# Patient Record
Sex: FEMALE | URBAN - METROPOLITAN AREA
[De-identification: names, ages, dates, MRNs, and addresses within clinical notes are randomized per-mention and may not be internally consistent; named-entity substitution may affect disease eponyms.]

---

## 2023-10-01 ENCOUNTER — HOSPITAL ENCOUNTER (INPATIENT)
Facility: HOSPITAL | Age: 63
LOS: 13 days | Discharge: HOME | DRG: 982 | End: 2023-10-14
Attending: INTERNAL MEDICINE | Admitting: STUDENT IN AN ORGANIZED HEALTH CARE EDUCATION/TRAINING PROGRAM
Payer: MEDICARE

## 2023-10-01 DIAGNOSIS — R60.0 EDEMA OF LEFT UPPER EXTREMITY: ICD-10-CM

## 2023-10-01 DIAGNOSIS — R33.9 URINARY RETENTION: ICD-10-CM

## 2023-10-01 DIAGNOSIS — J96.01 ACUTE HYPOXIC RESPIRATORY FAILURE (MULTI): Primary | ICD-10-CM

## 2023-10-01 DIAGNOSIS — M62.81 MUSCLE WEAKNESS: ICD-10-CM

## 2023-10-01 DIAGNOSIS — Z79.2 NEED FOR PROPHYLACTIC ANTIBIOTIC: ICD-10-CM

## 2023-10-01 DIAGNOSIS — K59.04 CHRONIC IDIOPATHIC CONSTIPATION: ICD-10-CM

## 2023-10-01 DIAGNOSIS — R00.0 TACHYCARDIA: ICD-10-CM

## 2023-10-01 DIAGNOSIS — K21.9 GASTROESOPHAGEAL REFLUX DISEASE WITHOUT ESOPHAGITIS: ICD-10-CM

## 2023-10-01 DIAGNOSIS — I10 PRIMARY HYPERTENSION: ICD-10-CM

## 2023-10-01 DIAGNOSIS — E11.9 TYPE 2 DIABETES MELLITUS WITHOUT COMPLICATION, WITHOUT LONG-TERM CURRENT USE OF INSULIN (MULTI): Chronic | ICD-10-CM

## 2023-10-01 LAB
ALBUMIN SERPL BCP-MCNC: 2 G/DL (ref 3.4–5)
ALP SERPL-CCNC: 81 U/L (ref 33–136)
ALT SERPL W P-5'-P-CCNC: 164 U/L (ref 7–45)
ANION GAP BLDV CALCULATED.4IONS-SCNC: ABNORMAL MMOL/L
ANION GAP SERPL CALC-SCNC: 12 MMOL/L (ref 10–20)
APTT PPP: 20 SECONDS (ref 27–38)
AST SERPL W P-5'-P-CCNC: 293 U/L (ref 9–39)
BASE EXCESS BLDV CALC-SCNC: 2.6 MMOL/L (ref -2–3)
BASOPHILS # BLD AUTO: 0.01 X10*3/UL (ref 0–0.1)
BASOPHILS NFR BLD AUTO: 0.1 %
BILIRUB SERPL-MCNC: 0.5 MG/DL (ref 0–1.2)
BODY TEMPERATURE: 37 DEGREES CELSIUS
BUN SERPL-MCNC: 34 MG/DL (ref 6–23)
CA-I BLDV-SCNC: ABNORMAL MMOL/L
CALCIUM SERPL-MCNC: 7.8 MG/DL (ref 8.6–10.6)
CARDIAC TROPONIN I PNL SERPL HS: 48 NG/L (ref 0–34)
CHLORIDE BLDV-SCNC: 125 MMOL/L (ref 98–107)
CHLORIDE SERPL-SCNC: 115 MMOL/L (ref 98–107)
CK SERPL-CCNC: 7961 U/L (ref 0–215)
CO2 SERPL-SCNC: 27 MMOL/L (ref 21–32)
CREAT SERPL-MCNC: 1.04 MG/DL (ref 0.5–1.05)
CRP SERPL-MCNC: 12.08 MG/DL
EOSINOPHIL # BLD AUTO: 0.11 X10*3/UL (ref 0–0.7)
EOSINOPHIL NFR BLD AUTO: 1.4 %
ERYTHROCYTE [DISTWIDTH] IN BLOOD BY AUTOMATED COUNT: 14.5 % (ref 11.5–14.5)
ERYTHROCYTE [SEDIMENTATION RATE] IN BLOOD BY WESTERGREN METHOD: 30 MM/H (ref 0–30)
GFR SERPL CREATININE-BSD FRML MDRD: 60 ML/MIN/1.73M*2
GLUCOSE BLDV-MCNC: 40 MG/DL (ref 74–99)
GLUCOSE SERPL-MCNC: 48 MG/DL (ref 74–99)
HCO3 BLDV-SCNC: 26.1 MMOL/L (ref 22–26)
HCT VFR BLD AUTO: 31.1 % (ref 36–46)
HCT VFR BLD EST: 29 % (ref 36–46)
HGB BLD-MCNC: 10.1 G/DL (ref 12–16)
HGB BLDV-MCNC: 9.5 G/DL (ref 12–16)
IMM GRANULOCYTES # BLD AUTO: 0.13 X10*3/UL (ref 0–0.7)
IMM GRANULOCYTES NFR BLD AUTO: 1.6 % (ref 0–0.9)
INR PPP: 1 (ref 0.9–1.1)
LACTATE BLDV-SCNC: 1.2 MMOL/L (ref 0.4–2)
LYMPHOCYTES # BLD AUTO: 0.85 X10*3/UL (ref 1.2–4.8)
LYMPHOCYTES NFR BLD AUTO: 10.6 %
MAGNESIUM SERPL-MCNC: 1.87 MG/DL (ref 1.6–2.4)
MCH RBC QN AUTO: 29.8 PG (ref 26–34)
MCHC RBC AUTO-ENTMCNC: 32.5 G/DL (ref 32–36)
MCV RBC AUTO: 92 FL (ref 80–100)
MONOCYTES # BLD AUTO: 0.46 X10*3/UL (ref 0.1–1)
MONOCYTES NFR BLD AUTO: 5.7 %
NEUTROPHILS # BLD AUTO: 6.48 X10*3/UL (ref 1.2–7.7)
NEUTROPHILS NFR BLD AUTO: 80.6 %
NRBC BLD-RTO: 0 /100 WBCS (ref 0–0)
OXYHGB MFR BLDV: 89.7 % (ref 45–75)
PCO2 BLDV: 35 MM HG (ref 41–51)
PH BLDV: 7.48 PH (ref 7.33–7.43)
PHOSPHATE SERPL-MCNC: 1.8 MG/DL (ref 2.5–4.9)
PLATELET # BLD AUTO: 251 X10*3/UL (ref 150–450)
PMV BLD AUTO: 10.4 FL (ref 7.5–11.5)
PO2 BLDV: 68 MM HG (ref 35–45)
POTASSIUM BLDV-SCNC: ABNORMAL MMOL/L
POTASSIUM SERPL-SCNC: 3.6 MMOL/L (ref 3.5–5.3)
PROT SERPL-MCNC: 4.1 G/DL (ref 6.4–8.2)
PROTHROMBIN TIME: 11.7 SECONDS (ref 9.8–12.8)
RBC # BLD AUTO: 3.39 X10*6/UL (ref 4–5.2)
SAO2 % BLDV: 93 % (ref 45–75)
SODIUM BLDV-SCNC: >180 MMOL/L (ref 136–145)
SODIUM SERPL-SCNC: 150 MMOL/L (ref 136–145)
WBC # BLD AUTO: 8 X10*3/UL (ref 4.4–11.3)

## 2023-10-01 PROCEDURE — 36415 COLL VENOUS BLD VENIPUNCTURE: CPT

## 2023-10-01 PROCEDURE — 83735 ASSAY OF MAGNESIUM: CPT

## 2023-10-01 PROCEDURE — 82550 ASSAY OF CK (CPK): CPT

## 2023-10-01 PROCEDURE — 99291 CRITICAL CARE FIRST HOUR: CPT | Performed by: INTERNAL MEDICINE

## 2023-10-01 PROCEDURE — 86038 ANTINUCLEAR ANTIBODIES: CPT

## 2023-10-01 PROCEDURE — 94002 VENT MGMT INPAT INIT DAY: CPT

## 2023-10-01 PROCEDURE — 85027 COMPLETE CBC AUTOMATED: CPT

## 2023-10-01 PROCEDURE — 71045 X-RAY EXAM CHEST 1 VIEW: CPT | Performed by: RADIOLOGY

## 2023-10-01 PROCEDURE — 82947 ASSAY GLUCOSE BLOOD QUANT: CPT

## 2023-10-01 PROCEDURE — 86140 C-REACTIVE PROTEIN: CPT | Performed by: HOSPITALIST

## 2023-10-01 PROCEDURE — 74018 RADEX ABDOMEN 1 VIEW: CPT | Performed by: RADIOLOGY

## 2023-10-01 PROCEDURE — 2020000001 HC ICU ROOM DAILY

## 2023-10-01 PROCEDURE — 85025 COMPLETE CBC W/AUTO DIFF WBC: CPT

## 2023-10-01 PROCEDURE — 2500000004 HC RX 250 GENERAL PHARMACY W/ HCPCS (ALT 636 FOR OP/ED)

## 2023-10-01 PROCEDURE — 2500000004 HC RX 250 GENERAL PHARMACY W/ HCPCS (ALT 636 FOR OP/ED): Performed by: STUDENT IN AN ORGANIZED HEALTH CARE EDUCATION/TRAINING PROGRAM

## 2023-10-01 PROCEDURE — 85652 RBC SED RATE AUTOMATED: CPT | Performed by: HOSPITALIST

## 2023-10-01 PROCEDURE — 86235 NUCLEAR ANTIGEN ANTIBODY: CPT | Performed by: HOSPITALIST

## 2023-10-01 PROCEDURE — 84484 ASSAY OF TROPONIN QUANT: CPT

## 2023-10-01 PROCEDURE — 80069 RENAL FUNCTION PANEL: CPT

## 2023-10-01 PROCEDURE — 84100 ASSAY OF PHOSPHORUS: CPT

## 2023-10-01 PROCEDURE — 82435 ASSAY OF BLOOD CHLORIDE: CPT

## 2023-10-01 PROCEDURE — 82805 BLOOD GASES W/O2 SATURATION: CPT

## 2023-10-01 PROCEDURE — 85610 PROTHROMBIN TIME: CPT

## 2023-10-01 PROCEDURE — 2500000005 HC RX 250 GENERAL PHARMACY W/O HCPCS

## 2023-10-01 PROCEDURE — 2500000005 HC RX 250 GENERAL PHARMACY W/O HCPCS: Performed by: STUDENT IN AN ORGANIZED HEALTH CARE EDUCATION/TRAINING PROGRAM

## 2023-10-01 PROCEDURE — 82085 ASSAY OF ALDOLASE: CPT

## 2023-10-01 PROCEDURE — 2500000001 HC RX 250 WO HCPCS SELF ADMINISTERED DRUGS (ALT 637 FOR MEDICARE OP): Performed by: STUDENT IN AN ORGANIZED HEALTH CARE EDUCATION/TRAINING PROGRAM

## 2023-10-01 RX ORDER — HEPARIN SODIUM 5000 [USP'U]/ML
5000 INJECTION, SOLUTION INTRAVENOUS; SUBCUTANEOUS EVERY 8 HOURS
Status: DISPENSED | OUTPATIENT
Start: 2023-10-01 | End: 2023-10-10

## 2023-10-01 RX ORDER — DEXTROSE, SODIUM CHLORIDE, SODIUM LACTATE, POTASSIUM CHLORIDE, AND CALCIUM CHLORIDE 5; .6; .31; .03; .02 G/100ML; G/100ML; G/100ML; G/100ML; G/100ML
125 INJECTION, SOLUTION INTRAVENOUS CONTINUOUS
Status: DISCONTINUED | OUTPATIENT
Start: 2023-10-01 | End: 2023-10-01

## 2023-10-01 RX ORDER — ESOMEPRAZOLE MAGNESIUM 40 MG/1
40 GRANULE, DELAYED RELEASE ORAL
Status: DISCONTINUED | OUTPATIENT
Start: 2023-10-02 | End: 2023-10-09

## 2023-10-01 RX ORDER — DEXTROSE 50 % IN WATER (D50W) INTRAVENOUS SYRINGE
Status: COMPLETED
Start: 2023-10-01 | End: 2023-10-01

## 2023-10-01 RX ORDER — POLYETHYLENE GLYCOL 3350 17 G/17G
17 POWDER, FOR SOLUTION ORAL DAILY
Status: DISCONTINUED | OUTPATIENT
Start: 2023-10-01 | End: 2023-10-04

## 2023-10-01 RX ORDER — DEXTROSE 50 % IN WATER (D50W) INTRAVENOUS SYRINGE
25
Status: DISCONTINUED | OUTPATIENT
Start: 2023-10-01 | End: 2023-10-08 | Stop reason: SDUPTHER

## 2023-10-01 RX ORDER — AMOXICILLIN 250 MG
1 CAPSULE ORAL NIGHTLY
Status: DISCONTINUED | OUTPATIENT
Start: 2023-10-01 | End: 2023-10-14 | Stop reason: HOSPADM

## 2023-10-01 RX ORDER — GABAPENTIN 250 MG/5ML
300 SOLUTION ORAL
Status: DISCONTINUED | OUTPATIENT
Start: 2023-10-02 | End: 2023-10-14 | Stop reason: HOSPADM

## 2023-10-01 RX ORDER — DEXTROSE MONOHYDRATE 100 MG/ML
0.3 INJECTION, SOLUTION INTRAVENOUS ONCE AS NEEDED
Status: DISCONTINUED | OUTPATIENT
Start: 2023-10-01 | End: 2023-10-08 | Stop reason: SDUPTHER

## 2023-10-01 RX ORDER — DEXTROSE MONOHYDRATE 50 MG/ML
100 INJECTION, SOLUTION INTRAVENOUS CONTINUOUS
Status: ACTIVE | OUTPATIENT
Start: 2023-10-01 | End: 2023-10-02

## 2023-10-01 RX ORDER — GABAPENTIN 250 MG/5ML
900 SOLUTION ORAL NIGHTLY
Status: DISCONTINUED | OUTPATIENT
Start: 2023-10-01 | End: 2023-10-14 | Stop reason: HOSPADM

## 2023-10-01 RX ORDER — DEXMEDETOMIDINE HYDROCHLORIDE 4 UG/ML
.2-1.5 INJECTION, SOLUTION INTRAVENOUS CONTINUOUS
Status: DISCONTINUED | OUTPATIENT
Start: 2023-10-01 | End: 2023-10-04

## 2023-10-01 RX ORDER — POLYETHYLENE GLYCOL 3350 17 G/17G
17 POWDER, FOR SOLUTION ORAL DAILY
Status: DISCONTINUED | OUTPATIENT
Start: 2023-10-01 | End: 2023-10-01

## 2023-10-01 RX ADMIN — DEXMEDETOMIDINE HYDROCHLORIDE 0.2 MCG/KG/HR: 4 INJECTION, SOLUTION INTRAVENOUS at 17:00

## 2023-10-01 RX ADMIN — DEXTROSE 50 % IN WATER (D50W) INTRAVENOUS SYRINGE 25 G: at 16:30

## 2023-10-01 RX ADMIN — Medication: at 17:00

## 2023-10-01 RX ADMIN — POLYETHYLENE GLYCOL 3350 17 G: 17 POWDER, FOR SOLUTION ORAL at 20:41

## 2023-10-01 RX ADMIN — DEXMEDETOMIDINE HYDROCHLORIDE 0.5 MCG/KG/HR: 4 INJECTION, SOLUTION INTRAVENOUS at 23:28

## 2023-10-01 RX ADMIN — DEXTROSE MONOHYDRATE 100 ML/HR: 50 INJECTION, SOLUTION INTRAVENOUS at 17:45

## 2023-10-01 RX ADMIN — SENNOSIDES AND DOCUSATE SODIUM 1 TABLET: 50; 8.6 TABLET ORAL at 20:40

## 2023-10-01 RX ADMIN — DEXTROSE MONOHYDRATE 25 G: 25 INJECTION, SOLUTION INTRAVENOUS at 16:30

## 2023-10-01 SDOH — HEALTH STABILITY: MENTAL HEALTH
STRESS IS WHEN SOMEONE FEELS TENSE, NERVOUS, ANXIOUS, OR CAN'T SLEEP AT NIGHT BECAUSE THEIR MIND IS TROUBLED. HOW STRESSED ARE YOU?: NOT AT ALL

## 2023-10-01 SDOH — SOCIAL STABILITY: SOCIAL INSECURITY: HAS ANYONE EVER THREATENED TO HURT YOUR FAMILY OR YOUR PETS?: UNABLE TO ASSESS

## 2023-10-01 SDOH — ECONOMIC STABILITY: INCOME INSECURITY: IN THE LAST 12 MONTHS, WAS THERE A TIME WHEN YOU WERE NOT ABLE TO PAY THE MORTGAGE OR RENT ON TIME?: NO

## 2023-10-01 SDOH — ECONOMIC STABILITY: TRANSPORTATION INSECURITY
IN THE PAST 12 MONTHS, HAS LACK OF TRANSPORTATION KEPT YOU FROM MEETINGS, WORK, OR FROM GETTING THINGS NEEDED FOR DAILY LIVING?: NO

## 2023-10-01 SDOH — SOCIAL STABILITY: SOCIAL INSECURITY: DOES ANYONE TRY TO KEEP YOU FROM HAVING/CONTACTING OTHER FRIENDS OR DOING THINGS OUTSIDE YOUR HOME?: UNABLE TO ASSESS

## 2023-10-01 SDOH — ECONOMIC STABILITY: FOOD INSECURITY: WITHIN THE PAST 12 MONTHS, YOU WORRIED THAT YOUR FOOD WOULD RUN OUT BEFORE YOU GOT MONEY TO BUY MORE.: NEVER TRUE

## 2023-10-01 SDOH — SOCIAL STABILITY: SOCIAL NETWORK
DO YOU BELONG TO ANY CLUBS OR ORGANIZATIONS SUCH AS CHURCH GROUPS UNIONS, FRATERNAL OR ATHLETIC GROUPS, OR SCHOOL GROUPS?: NO

## 2023-10-01 SDOH — SOCIAL STABILITY: SOCIAL INSECURITY: DO YOU FEEL ANYONE HAS EXPLOITED OR TAKEN ADVANTAGE OF YOU FINANCIALLY OR OF YOUR PERSONAL PROPERTY?: UNABLE TO ASSESS

## 2023-10-01 SDOH — SOCIAL STABILITY: SOCIAL NETWORK: HOW OFTEN DO YOU ATTENT MEETINGS OF THE CLUB OR ORGANIZATION YOU BELONG TO?: 1 TO 4 TIMES PER YEAR

## 2023-10-01 SDOH — SOCIAL STABILITY: SOCIAL INSECURITY: WITHIN THE LAST YEAR, HAVE YOU BEEN HUMILIATED OR EMOTIONALLY ABUSED IN OTHER WAYS BY YOUR PARTNER OR EX-PARTNER?: NO

## 2023-10-01 SDOH — SOCIAL STABILITY: SOCIAL INSECURITY
WITHIN THE LAST YEAR, HAVE YOU BEEN KICKED, HIT, SLAPPED, OR OTHERWISE PHYSICALLY HURT BY YOUR PARTNER OR EX-PARTNER?: NO

## 2023-10-01 SDOH — ECONOMIC STABILITY: FOOD INSECURITY: WITHIN THE PAST 12 MONTHS, THE FOOD YOU BOUGHT JUST DIDN'T LAST AND YOU DIDN'T HAVE MONEY TO GET MORE.: NEVER TRUE

## 2023-10-01 SDOH — SOCIAL STABILITY: SOCIAL NETWORK: ARE YOU MARRIED, WIDOWED, DIVORCED, SEPARATED, NEVER MARRIED, OR LIVING WITH A PARTNER?: PATIENT DECLINED

## 2023-10-01 SDOH — SOCIAL STABILITY: SOCIAL INSECURITY: ARE THERE ANY APPARENT SIGNS OF INJURIES/BEHAVIORS THAT COULD BE RELATED TO ABUSE/NEGLECT?: UNABLE TO ASSESS

## 2023-10-01 SDOH — SOCIAL STABILITY: SOCIAL INSECURITY: WITHIN THE LAST YEAR, HAVE YOU BEEN AFRAID OF YOUR PARTNER OR EX-PARTNER?: NO

## 2023-10-01 SDOH — SOCIAL STABILITY: SOCIAL INSECURITY
WITHIN THE LAST YEAR, HAVE TO BEEN RAPED OR FORCED TO HAVE ANY KIND OF SEXUAL ACTIVITY BY YOUR PARTNER OR EX-PARTNER?: NO

## 2023-10-01 SDOH — SOCIAL STABILITY: SOCIAL INSECURITY: ARE YOU OR HAVE YOU BEEN THREATENED OR ABUSED PHYSICALLY, EMOTIONALLY, OR SEXUALLY BY ANYONE?: UNABLE TO ASSESS

## 2023-10-01 SDOH — SOCIAL STABILITY: SOCIAL INSECURITY: ABUSE: ADULT

## 2023-10-01 SDOH — SOCIAL STABILITY: SOCIAL INSECURITY: HAVE YOU HAD THOUGHTS OF HARMING ANYONE ELSE?: UNABLE TO ASSESS

## 2023-10-01 SDOH — SOCIAL STABILITY: SOCIAL NETWORK: IN A TYPICAL WEEK, HOW MANY TIMES DO YOU TALK ON THE PHONE WITH FAMILY, FRIENDS, OR NEIGHBORS?: ONCE A WEEK

## 2023-10-01 SDOH — SOCIAL STABILITY: SOCIAL NETWORK: HOW OFTEN DO YOU GET TOGETHER WITH FRIENDS OR RELATIVES?: ONCE A WEEK

## 2023-10-01 SDOH — SOCIAL STABILITY: SOCIAL INSECURITY: DO YOU FEEL UNSAFE GOING BACK TO THE PLACE WHERE YOU ARE LIVING?: UNABLE TO ASSESS

## 2023-10-01 SDOH — ECONOMIC STABILITY: TRANSPORTATION INSECURITY
IN THE PAST 12 MONTHS, HAS THE LACK OF TRANSPORTATION KEPT YOU FROM MEDICAL APPOINTMENTS OR FROM GETTING MEDICATIONS?: NO

## 2023-10-01 SDOH — ECONOMIC STABILITY: HOUSING INSECURITY
IN THE LAST 12 MONTHS, WAS THERE A TIME WHEN YOU DID NOT HAVE A STEADY PLACE TO SLEEP OR SLEPT IN A SHELTER (INCLUDING NOW)?: NO

## 2023-10-01 SDOH — ECONOMIC STABILITY: HOUSING INSECURITY: IN THE LAST 12 MONTHS, HOW MANY PLACES HAVE YOU LIVED?: 1

## 2023-10-01 SDOH — SOCIAL STABILITY: SOCIAL NETWORK: HOW OFTEN DO YOU ATTEND CHURCH OR RELIGIOUS SERVICES?: NEVER

## 2023-10-01 SDOH — ECONOMIC STABILITY: INCOME INSECURITY: HOW HARD IS IT FOR YOU TO PAY FOR THE VERY BASICS LIKE FOOD, HOUSING, MEDICAL CARE, AND HEATING?: NOT HARD AT ALL

## 2023-10-01 ASSESSMENT — LIFESTYLE VARIABLES
SKIP TO QUESTIONS 9-10: 1
HOW MANY STANDARD DRINKS CONTAINING ALCOHOL DO YOU HAVE ON A TYPICAL DAY: 1 OR 2
AUDIT-C TOTAL SCORE: 1
SUBSTANCE_ABUSE_PAST_12_MONTHS: NO
AUDIT-C TOTAL SCORE: 1
HOW OFTEN DO YOU HAVE 6 OR MORE DRINKS ON ONE OCCASION: NEVER
PRESCIPTION_ABUSE_PAST_12_MONTHS: NO
HOW OFTEN DO YOU HAVE A DRINK CONTAINING ALCOHOL: MONTHLY OR LESS

## 2023-10-01 ASSESSMENT — ACTIVITIES OF DAILY LIVING (ADL)
HEARING - LEFT EAR: UNABLE TO ASSESS
TOILETING: UNABLE TO ASSESS
FEEDING YOURSELF: UNABLE TO ASSESS
PATIENT'S MEMORY ADEQUATE TO SAFELY COMPLETE DAILY ACTIVITIES?: UNABLE TO ASSESS
JUDGMENT_ADEQUATE_SAFELY_COMPLETE_DAILY_ACTIVITIES: UNABLE TO ASSESS
ADEQUATE_TO_COMPLETE_ADL: UNABLE TO ASSESS
HEARING - RIGHT EAR: UNABLE TO ASSESS
WALKS IN HOME: UNABLE TO ASSESS
ASSISTIVE_DEVICE: EYEGLASSES
ADEQUATE_TO_COMPLETE_ADL: UNABLE TO ASSESS
JUDGMENT_ADEQUATE_SAFELY_COMPLETE_DAILY_ACTIVITIES: UNABLE TO ASSESS
BATHING: UNABLE TO ASSESS
GROOMING: UNABLE TO ASSESS
ASSISTIVE_DEVICE: EYEGLASSES
PATIENT'S MEMORY ADEQUATE TO SAFELY COMPLETE DAILY ACTIVITIES?: UNABLE TO ASSESS
DRESSING YOURSELF: UNABLE TO ASSESS

## 2023-10-01 ASSESSMENT — PATIENT HEALTH QUESTIONNAIRE - PHQ9
2. FEELING DOWN, DEPRESSED OR HOPELESS: NOT AT ALL
1. LITTLE INTEREST OR PLEASURE IN DOING THINGS: NOT AT ALL
SUM OF ALL RESPONSES TO PHQ9 QUESTIONS 1 & 2: 0

## 2023-10-01 ASSESSMENT — COGNITIVE AND FUNCTIONAL STATUS - GENERAL
PERSONAL GROOMING: TOTAL
DRESSING REGULAR UPPER BODY CLOTHING: TOTAL
DAILY ACTIVITIY SCORE: 6
PATIENT BASELINE BEDBOUND: UNABLE TO ASSESS AT THIS TIME
MOVING TO AND FROM BED TO CHAIR: TOTAL
CLIMB 3 TO 5 STEPS WITH RAILING: TOTAL
WALKING IN HOSPITAL ROOM: TOTAL
MOVING FROM LYING ON BACK TO SITTING ON SIDE OF FLAT BED WITH BEDRAILS: TOTAL
DRESSING REGULAR LOWER BODY CLOTHING: TOTAL
EATING MEALS: TOTAL
TURNING FROM BACK TO SIDE WHILE IN FLAT BAD: TOTAL
MOBILITY SCORE: 6
TOILETING: TOTAL
STANDING UP FROM CHAIR USING ARMS: TOTAL
HELP NEEDED FOR BATHING: TOTAL

## 2023-10-01 ASSESSMENT — COLUMBIA-SUICIDE SEVERITY RATING SCALE - C-SSRS
2. HAVE YOU ACTUALLY HAD ANY THOUGHTS OF KILLING YOURSELF?: NO
1. IN THE PAST MONTH, HAVE YOU WISHED YOU WERE DEAD OR WISHED YOU COULD GO TO SLEEP AND NOT WAKE UP?: NO
6. HAVE YOU EVER DONE ANYTHING, STARTED TO DO ANYTHING, OR PREPARED TO DO ANYTHING TO END YOUR LIFE?: NO
6. HAVE YOU EVER DONE ANYTHING, STARTED TO DO ANYTHING, OR PREPARED TO DO ANYTHING TO END YOUR LIFE?: NO
2. HAVE YOU ACTUALLY HAD ANY THOUGHTS OF KILLING YOURSELF?: NO

## 2023-10-01 ASSESSMENT — PAIN - FUNCTIONAL ASSESSMENT: PAIN_FUNCTIONAL_ASSESSMENT: CPOT (CRITICAL CARE PAIN OBSERVATION TOOL)

## 2023-10-01 NOTE — H&P
MRN: [id number]  CHIEF COMPLAINT  ====================================  Generalized weakness    HISTORY OF PRESENT ILLNESS  ====================================  Bisi Mckay is a 64 yo F w/ PMHx primary biliary cirrhosis, HTN, GERD, DM, breast cancer, and osteoarthritis presenting to [facility] on 9/25 for weakness and fall [facility] acute hypoxic respiratory failure and rheumatologic/neuromuscular consult.    Patient was reportedly having ascending weakness over the past several weeks. She endorsed difficulty climbing stairs, getting up from sitting position, combing her hair and holding a spoon. Per documentation had “speech irregularities” or dysarthria. She denied numbness or tingling. Two weeks ago she endorsed URI symptoms but her home covid test was negative. Her PCP ordered an MRI 9/10 to further evaluate her progressive weakness which was unremarkable. She was found down at home (reportedly for a few hours) by her son on 9/25. On initial arrival was tachycardic to 120s, had leukocytosis to 12.6, creat of 1.75 (baseline 0.7), CK of 2880 and trop of 296. CT head, CT C spine and CXR were unremarkable. She was admitted to the [facility] floor with concern for rhabdo. She was managed with maintenance fluids for the rhabdo however CK continued to rise with peak of 11,773. She was also treated for a UTI with ceftriaxone (9/26 - ). Nephrology was consulted for SKIP, thought to be 2/2 to rhabdo and started on bicarb drip for metabolic acidosis. On 9/27 SKIP continued to worsen w/ Cr at 2.5; had a renal ultrasound which was unremarkable. On 9/28 started to desat; was requiring 2L NC on the floor. Also endorsing swallowing difficulties and dysphagia. Was evaluated by speech who recommended strict NPO.     She had several choking episodes on the floor, and eventually a rapid response was called for tachypnea, tachycardia, fever and lethargy. Patient was intubated and transferred to the ICU.  Differential is  broad and includes autoimmune process, neuromuscular disease or central process. Patient was ultimately transferred for eval by subspecialist teams.    [facility] MICU:  Patient arrived alert but on mechanical ventilation. Patient's son and sister-in-law were bedside. Son notes that patient had been on a bus trip to [place], [place] within the past month. States the trip appeared to fatigue and weaken his mother. Noted that patient became progressively weak and complained of some lower back pain. Patient eventually fell once on the 23rd and then a second time on the 25th before being admitted to [facility]. Son notes that patient has a bladder stimulation device that has special conditions for use with MRI. He is not sure what those conditions are, but is willing to bring the card with the device's information tomorrow morning.    On Arrival to [facility]:  VS: T 37.1  /58  RR 16 O2 94% on RA    Labs:  CBC WBC 12.6 Hb 13.2 Plt 178  RFP Na 135 K 4.3 Cl 105 HCO3 17 BUN 36 SCr 1.75 (on admit) -> 2.5 (peak) Glc 253 Ca 9.2   LFTs AST//78 Alk Phos 99 T Bili 1 Alb 3.0 TProt 4.9  Coags INR 1.0 PT 12 (9/29)  COVID Negative  CK 2880 (9/25) -> 11,773 (9/30)  Trop 296 (9/25) -> 457 -> 461 -> 337 (9/28)  Lactate 1.7  Ammonia 31  ESR: 68  CRP: 29  TSH: 0.032 (low); T4 1.28 (wnl); anti TPO ab (<28); nml  B12: >2000  C3 132, C4 56 (high)  Ceruloplasmin 37 (wnl)  Cryoglobulin neg   AM cortisol: wnl   LDH  >750  UA (9/29): large blood, 3-5 RBCs, +protein, trace leuk est, 1+ bacteria, +casts       Imaging:   EKG sinus tachycardia   CXR IMPRESSION:No significant change of hazy left mid to lower lung opacification. Similar small left pleural effusion.    Renal US (9/27/23)  IMPRESSION:  No obstruction or acute abnormality in the kidneys. Borderline increased  renal echogenicity consistent with medical renal disease.    MRI Brain (9/10/23)  FINDINGS:  INTRACRANIAL STRUCTURES/VENTRICLES: No abnormal restricted  diffusion or  susceptibility. No mass effect or midline shift. No evidence of an acute  intracranial hemorrhage. Dilated perivascular space left lower basal ganglia  region. Few scattered periventricular and subcortical white matter FLAIR  hyperintensities are nonspecific but statistically most consistent with mild  chronic microvascular angiopathy. Small dural calcifications are near the  vertex and beneath the left coronal suture. Normal ventricular size and  morphology. Mild involutional changes. The sellar/suprasellar regions  appear unremarkable. The normal signal voids within the major intracranial  vessels appear maintained.     ORBITS: The visualized portion of the orbits demonstrate no acute abnormality.     SINUSES: The visualized paranasal sinuses and mastoid air cells demonstrate  no acute abnormality.     BONES/SOFT TISSUES: The bone marrow signal intensity appears normal. The soft  tissues demonstrate no acute abnormality. Mild hyperostosis frontalis  interna.     IMPRESSION:  Minimal chronic microvascular angiopathy. No acute intracranial abnormality.    CT Head (9/25)  FINDINGS:  BRAIN/VENTRICLES: There is no acute intracranial hemorrhage, mass effect or  midline shift. No abnormal extra-axial fluid collection. The gray-white  differentiation is maintained without evidence of an acute infarct. There is  no evidence of hydrocephalus.     ORBITS: The visualized portion of the orbits demonstrate no acute abnormality.     SINUSES: The visualized paranasal sinuses and mastoid air cells demonstrate  no acute abnormality.     SOFT TISSUES/SKULL: No acute abnormality of the visualized skull or soft  tissues.     IMPRESSION:  No acute intracranial abnormality.    CT C Spine w/wo (9/25)  IMPRESSION:  1. No acute abnormality of the cervical spine.  2. Enlargement heterogeneity and multi-nodularity of the right thyroid lobe.  Recommend further evaluation with nonemergent thyroid ultrasound.      Infectious  [facility]   Respiratory panel (9/30):  Adenovirus, coronavirus, COVID, human metapneumovirus, Influenza A/B, Paraflu 1-4, Rhinovirus, RSV, Myocplasma pneumo, chlamydia pneuom, bordatella - ALL NEGATIVE    MRSA nares neg (9/29)    RPR non reactive (9/29)  HIV ½ neg (9/29)    CSF Studies:  E coli, H flu, listeria, Neisseria meningitides, Strep agalactiae, S pneumo, CMV, enterovirus, HVS ½, HHV 6, human parechovirus, VZV, Cryptococcus - ALL NEGATIVE     CSF Culture (9/29/23)  PROCEDURE:                Culture Cerebrospinal    ACCESSION:                [id number]                            Fluid with Gram Stain                            [*1]  SOURCE:                   Cerebrospinal Fluid      BODY SITE:  COLLECTED DATE/TIME:      9/29/2023 17:14 EDT      RECEIVED DATE/TIME:       9/29/2023 17:36 EDT  START DATE/TIME:          9/29/2023 17:36 EDT      FREE TEXT SOURCE:    PRELIMINARY REPORTS  Preliminary Report  []  Verified Date/Time/Personnel: 9/30/2023 12:48 EDT  No growth to date    STAINS    GS  []  Sedimented  No organisms seen.    Tracheal Aspirate (9/29)  STAINS  GS  []    Rare Polymorphonuclear cells  Rare Mononuclear cells  No organisms seen.    Blood Culture (9/29) x2  PRELIMINARY REPORTS  Preliminary Report  []  Verified Date/Time/Personnel: 9/29/2023 07:59 EDT  Culture has been received in lab and is no growth to date. Routine cultures are held for 5 days.    Blood Culture: Fungal (9/28)    PRELIMINARY REPORTS  Preliminary Report  []  Verified Date/Time/Personnel: 9/29/2023 07:59 EDT  Culture has been received in lab and is no growth to date. Culture will be held for four weeks.    Urine Culture (9/29)  Preliminary Report  []  Verified Date/Time/Personnel: 9/30/2023 11:48 EDT  No growth to date    PAST MEDICAL HISTORY  ====================================  -osteoarthritis   -GERD  -HTN  -IBS  -goiter   -T2DM  -primary biliary cirrhosis     PAST SURGICAL  HISTORY  ====================================  -operation on thumb 2018  -carpal tunnel release 2016  -bowel obstruction 2016  -appendectomy 2014  -lumpectomy 2012  -hysterectomy 2007  -gastric bypass 2002    HOME MEDICATIONS  ====================================  Calcium 600+D oral tablet 1 tab(s), Oral, qAM   cetirizine 10 mg oral tablet 10 mg = 1 tab(s), Oral, qAM  Cipro 500 mg, Oral, q12h   Coenzyme Q10 60 mg oral tablet , Oral, BID   Colace 100 mg oral capsule 100 mg = 1 cap(s), PRN, Oral, BID   Dexilant 60 mg oral delayed release capsule   gabapentin 300 mg oral capsule   hyoscyamine 0.125 mg oral tablet 0.125 mg = 1 tab(s), PRN, Oral, BID   Magnesium 250 mg tablet 250 mg = 1 tab(s), Oral, qDay   MetFORMIN (Eqv-Fortamet) 500 mg, Oral, qDay   metroNIDAZOLE 500 mg, q12h   Milk Thistle oral capsule   Niacinamide 500 mg oral tablet 500 mg = 1 tab(s), Oral, Every other day   prenatal Multivitamins oral tablet 1 tab(s), Oral, qAM   Slow Fe 160 mg (50 mg elemental iron) oral tablet, extended release 160 mg = 1 tab(s), Oral, Mon/Wed/Fri   spironolactone 50 mg oral tablet 50 mg = 1 tab(s), Oral, qAM   Trulance 3 mg oral tablet 3 mg = 1 tab(s), Oral, qDay   Vitamin B-12 1000 mcg oral tablet 2,000 mcg = 2 tab(s), Oral, qAM   Vitamin C 1000 mg oral tablet 2,000 mg = 2 tab(s), Oral, BID   Vitamin D3 5000 intl units oral tablet 5,000 International_Unit = 1 tab(s), Oral, qAM   Zinc 140 mg (as elemental zinc 50 mg) oral tablet 140 mg = 1 tab(s), Oral, Tuesday & Saturday    Past Medical History  She has a past medical history of Abnormal liver enzymes, Arthritis, Breast cancer (CMS/HCC), Constipation, Diabetes mellitus, type 2 (CMS/HCC), GERD (gastroesophageal reflux disease), Hypertension, Irritable bowel syndrome, Muscle weakness, and Neuropathy.    Surgical History  She has a past surgical history that includes Carpal tunnel release (N/A, 2016); Abdominal adhesion surgery (09/24/2014); Appendectomy (09/24/2014);  Cholecystectomy (11/01/2012); Breast lumpectomy (06/2012); Hysterectomy (04/01/2007); and Bladder suspension (04/01/2007).     Social History  She reports that she has never smoked. She does not have any smokeless tobacco history on file. She reports current alcohol use. She reports that she does not use drugs.    Family History  Family History   Problem Relation Name Age of Onset    Breast cancer Mother      Diabetes Mother      Hypertension Mother      Hyperlipidemia Mother      Cancer Father      Hypertension Father      Pancreatic cancer Father      Stomach cancer Father      Diabetes Sister      Hypertension Sister      Asthma Son          Allergies  Bactrim [sulfamethoxazole-trimethoprim]    Review of Systems  Patient denies pain.  Further ROS cannot be performed as patient both nonverbal and was also sedated upon second attempt to obtain history.     Physical Exam  Constitutional:       General: She is not in acute distress.     Appearance: Normal appearance.      Interventions: She is intubated.   HENT:      Head: Normocephalic and atraumatic.      Mouth/Throat:      Mouth: Mucous membranes are moist.      Pharynx: Oropharynx is clear. No oropharyngeal exudate or posterior oropharyngeal erythema.   Eyes:      General: No scleral icterus.     Extraocular Movements: Extraocular movements intact.      Conjunctiva/sclera: Conjunctivae normal.      Pupils: Pupils are equal, round, and reactive to light.   Cardiovascular:      Rate and Rhythm: Normal rate and regular rhythm.      Pulses: Normal pulses.      Heart sounds: Normal heart sounds.   Pulmonary:      Effort: She is intubated.      Breath sounds: Normal breath sounds. No wheezing, rhonchi or rales.   Abdominal:      General: Abdomen is flat. Bowel sounds are normal.      Palpations: Abdomen is soft.      Tenderness: There is no abdominal tenderness. There is no guarding or rebound.   Musculoskeletal:         General: No tenderness or signs of injury.       "Cervical back: Normal range of motion.      Right lower leg: No edema.      Left lower leg: No edema.   Skin:     General: Skin is warm and dry.      Coloration: Skin is not jaundiced or pale.      Findings: No bruising, erythema, lesion or rash.   Neurological:      Mental Status: She is alert.      Motor: Weakness present.   Psychiatric:         Mood and Affect: Mood normal.         Behavior: Behavior normal.          Last Recorded Vitals  Blood pressure 97/53, pulse 64, temperature 36.3 °C (97.3 °F), temperature source Temporal, resp. rate 16, height 1.651 m (5' 5\"), weight 104 kg (228 lb 6.3 oz), SpO2 99 %.    Relevant Results    Scheduled medications  influenza, 0.7 mL, intramuscular, During hospitalization  heparin (porcine), 5,000 Units, subcutaneous, q8h  oxygen, , inhalation, Continuous - 02/gases  pneumococcal conjugate, 0.5 mL, intramuscular, During hospitalization  polyethylene glycol, 17 g, oral, Daily      Continuous medications  dexmedeTOMIDine, 0.2-1.5 mcg/kg/hr, Last Rate: Stopped (10/01/23 1821)  [Held by provider] dextrose 5%, 100 mL/hr, Last Rate: Stopped (10/01/23 1821)      PRN medications  PRN medications: dextrose, dextrose, glucagon   Results for orders placed or performed during the hospital encounter of 10/01/23 (from the past 24 hour(s))   Blood Gas Venous Full Panel   Result Value Ref Range    POCT pH, Venous 7.48 (H) 7.33 - 7.43 pH    POCT pCO2, Venous 35 (L) 41 - 51 mm Hg    POCT pO2, Venous 68 (H) 35 - 45 mm Hg    POCT SO2, Venous 93 (H) 45 - 75 %    POCT Oxy Hemoglobin, Venous 89.7 (H) 45.0 - 75.0 %    POCT Hematocrit Calculated, Venous 29.0 (L) 36.0 - 46.0 %    POCT Sodium, Venous >180 (HH) 136 - 145 mmol/L    POCT Potassium, Venous      POCT Chloride, Venous 125 (H) 98 - 107 mmol/L    POCT Ionized Calicum, Venous      POCT Glucose, Venous 40 (LL) 74 - 99 mg/dL    POCT Lactate, Venous 1.2 0.4 - 2.0 mmol/L    POCT Base Excess, Venous 2.6 -2.0 - 3.0 mmol/L    POCT HCO3 Calculated, " Venous 26.1 (H) 22.0 - 26.0 mmol/L    POCT Hemoglobin, Venous 9.5 (L) 12.0 - 16.0 g/dL    POCT Anion Gap, Venous      Patient Temperature 37.0 degrees Celsius   Troponin I, High Sensitivity   Result Value Ref Range    Troponin I, High Sensitivity 48 (H) 0 - 34 ng/L   Magnesium   Result Value Ref Range    Magnesium 1.87 1.60 - 2.40 mg/dL   Phosphorus   Result Value Ref Range    Phosphorus 1.8 (L) 2.5 - 4.9 mg/dL   CBC and Auto Differential   Result Value Ref Range    WBC 8.0 4.4 - 11.3 x10*3/uL    nRBC 0.0 0.0 - 0.0 /100 WBCs    RBC 3.39 (L) 4.00 - 5.20 x10*6/uL    Hemoglobin 10.1 (L) 12.0 - 16.0 g/dL    Hematocrit 31.1 (L) 36.0 - 46.0 %    MCV 92 80 - 100 fL    MCH 29.8 26.0 - 34.0 pg    MCHC 32.5 32.0 - 36.0 g/dL    RDW 14.5 11.5 - 14.5 %    Platelets 251 150 - 450 x10*3/uL    MPV 10.4 7.5 - 11.5 fL    Neutrophils % 80.6 40.0 - 80.0 %    Immature Granulocytes %, Automated 1.6 (H) 0.0 - 0.9 %    Lymphocytes % 10.6 13.0 - 44.0 %    Monocytes % 5.7 2.0 - 10.0 %    Eosinophils % 1.4 0.0 - 6.0 %    Basophils % 0.1 0.0 - 2.0 %    Neutrophils Absolute 6.48 1.20 - 7.70 x10*3/uL    Immature Granulocytes Absolute, Automated 0.13 0.00 - 0.70 x10*3/uL    Lymphocytes Absolute 0.85 (L) 1.20 - 4.80 x10*3/uL    Monocytes Absolute 0.46 0.10 - 1.00 x10*3/uL    Eosinophils Absolute 0.11 0.00 - 0.70 x10*3/uL    Basophils Absolute 0.01 0.00 - 0.10 x10*3/uL   Comprehensive metabolic panel   Result Value Ref Range    Glucose 48 (LL) 74 - 99 mg/dL    Sodium 150 (H) 136 - 145 mmol/L    Potassium 3.6 3.5 - 5.3 mmol/L    Chloride 115 (H) 98 - 107 mmol/L    Bicarbonate 27 21 - 32 mmol/L    Anion Gap 12 10 - 20 mmol/L    Urea Nitrogen 34 (H) 6 - 23 mg/dL    Creatinine 1.04 0.50 - 1.05 mg/dL    eGFR 60 (L) >60 mL/min/1.73m*2    Calcium 7.8 (L) 8.6 - 10.6 mg/dL    Albumin 2.0 (L) 3.4 - 5.0 g/dL    Alkaline Phosphatase 81 33 - 136 U/L    Total Protein 4.1 (L) 6.4 - 8.2 g/dL     (H) 9 - 39 U/L    Bilirubin, Total 0.5 0.0 - 1.2 mg/dL    ALT  164 (H) 7 - 45 U/L   Creatine Kinase   Result Value Ref Range    Creatine Kinase 7,961 (H) 0 - 215 U/L      Assessment/Plan   Principal Problem:    Acute hypoxic respiratory failure (CMS/HCC)    Neuro  - patient arrived alert  - on precedex    Cardiovascular  #Hx of Hypertension  - currently hypotensive  - holding home BP meds    Pulmonary  #Acute hypoxic respiratory failure  - patient intubated with minimal vent settings  - 450, 16, peep 5, 30%  - had been hypoxic/tachypneic at [facility]  - wean O2    GI  #Constipation  - bowel regimen    Renal/  #Neurogenic bladder  - has bladder stimulating device  - specific requirements for MRI, son has card with device info, will bring tomorrow morning  - [facility]/mri, [phone] (for mri info once we have model number)  - rae catheter    #Hypernatremia  - Na 150  - D5 water 100ml/hr    #SKIP, improving  - Cr 1.04    ID  - no known issues  - had received Doxycycline for lyme?  - WBC wnl    MSK/Skel  #Generalized weakness  R/o Guillaine [place], Lyme, MG, Polymyositis, MS  - Anti-Ach, Lyme 2 step, NORMA, altolase  - consider neuro consult in morning  - may require MRI spine, c/b bladder stimulator    #Rhabdomyolysis  - CK trending down  - 11,773 (9/30) -> 7,961 (10/1)  - elevated transaminases    Endo  #Hypoglycemia  #T2DM  - hold home metformin  - hypoglycemia protocol    Fluids: free water flushes  Electrolytes: replete prn  Nutrition: NPO  DVT Ppx: subcutaneous heparin  GI Ppx: esomeprazole  Abx: none  Access: PIVs    Dispo: Pt is a 64yo F being treated in the MICU for acute hypoxic respiratory failure and generalized weakness. Patient currently intubated with mechanical ventilation. Estimated LOS > 48hrs.       Regulo Cobos, DO  Internal Medicine PGY1

## 2023-10-02 LAB
ALBUMIN SERPL BCP-MCNC: 1.9 G/DL (ref 3.4–5)
ALBUMIN SERPL BCP-MCNC: 1.9 G/DL (ref 3.4–5)
ANION GAP SERPL CALC-SCNC: 11 MMOL/L (ref 10–20)
ANION GAP SERPL CALC-SCNC: 12 MMOL/L (ref 10–20)
BUN SERPL-MCNC: 34 MG/DL (ref 6–23)
BUN SERPL-MCNC: 34 MG/DL (ref 6–23)
CALCIUM SERPL-MCNC: 7.6 MG/DL (ref 8.6–10.6)
CALCIUM SERPL-MCNC: 7.9 MG/DL (ref 8.6–10.6)
CHLORIDE SERPL-SCNC: 111 MMOL/L (ref 98–107)
CHLORIDE SERPL-SCNC: 113 MMOL/L (ref 98–107)
CO2 SERPL-SCNC: 25 MMOL/L (ref 21–32)
CO2 SERPL-SCNC: 26 MMOL/L (ref 21–32)
CREAT SERPL-MCNC: 0.92 MG/DL (ref 0.5–1.05)
CREAT SERPL-MCNC: 0.96 MG/DL (ref 0.5–1.05)
ENA JO1 AB SER QL IA: <0.2 AI
ENA SS-A AB SER IA-ACNC: <0.2 AI
ENA SS-B AB SER IA-ACNC: <0.2 AI
ERYTHROCYTE [DISTWIDTH] IN BLOOD BY AUTOMATED COUNT: 14.6 % (ref 11.5–14.5)
GFR SERPL CREATININE-BSD FRML MDRD: 66 ML/MIN/1.73M*2
GFR SERPL CREATININE-BSD FRML MDRD: 69 ML/MIN/1.73M*2
GLUCOSE BLD MANUAL STRIP-MCNC: 116 MG/DL (ref 74–99)
GLUCOSE BLD MANUAL STRIP-MCNC: 180 MG/DL (ref 74–99)
GLUCOSE BLD MANUAL STRIP-MCNC: 75 MG/DL (ref 74–99)
GLUCOSE SERPL-MCNC: 104 MG/DL (ref 74–99)
GLUCOSE SERPL-MCNC: 176 MG/DL (ref 74–99)
HCT VFR BLD AUTO: 29.3 % (ref 36–46)
HGB BLD-MCNC: 9.3 G/DL (ref 12–16)
MAGNESIUM SERPL-MCNC: 1.88 MG/DL (ref 1.6–2.4)
MAGNESIUM SERPL-MCNC: 1.9 MG/DL (ref 1.6–2.4)
MCH RBC QN AUTO: 30.1 PG (ref 26–34)
MCHC RBC AUTO-ENTMCNC: 31.7 G/DL (ref 32–36)
MCV RBC AUTO: 95 FL (ref 80–100)
NRBC BLD-RTO: 0 /100 WBCS (ref 0–0)
PHOSPHATE SERPL-MCNC: 1.7 MG/DL (ref 2.5–4.9)
PHOSPHATE SERPL-MCNC: 1.9 MG/DL (ref 2.5–4.9)
PLATELET # BLD AUTO: 212 X10*3/UL (ref 150–450)
PMV BLD AUTO: 10.3 FL (ref 7.5–11.5)
POTASSIUM SERPL-SCNC: 3.9 MMOL/L (ref 3.5–5.3)
POTASSIUM SERPL-SCNC: 4 MMOL/L (ref 3.5–5.3)
RBC # BLD AUTO: 3.09 X10*6/UL (ref 4–5.2)
SODIUM SERPL-SCNC: 144 MMOL/L (ref 136–145)
SODIUM SERPL-SCNC: 146 MMOL/L (ref 136–145)
WBC # BLD AUTO: 6.4 X10*3/UL (ref 4.4–11.3)

## 2023-10-02 PROCEDURE — 82947 ASSAY GLUCOSE BLOOD QUANT: CPT

## 2023-10-02 PROCEDURE — 2020000001 HC ICU ROOM DAILY

## 2023-10-02 PROCEDURE — 36415 COLL VENOUS BLD VENIPUNCTURE: CPT | Performed by: HOSPITALIST

## 2023-10-02 PROCEDURE — 96372 THER/PROPH/DIAG INJ SC/IM: CPT

## 2023-10-02 PROCEDURE — 86235 NUCLEAR ANTIGEN ANTIBODY: CPT | Performed by: STUDENT IN AN ORGANIZED HEALTH CARE EDUCATION/TRAINING PROGRAM

## 2023-10-02 PROCEDURE — 2500000004 HC RX 250 GENERAL PHARMACY W/ HCPCS (ALT 636 FOR OP/ED): Performed by: STUDENT IN AN ORGANIZED HEALTH CARE EDUCATION/TRAINING PROGRAM

## 2023-10-02 PROCEDURE — 83516 IMMUNOASSAY NONANTIBODY: CPT | Performed by: STUDENT IN AN ORGANIZED HEALTH CARE EDUCATION/TRAINING PROGRAM

## 2023-10-02 PROCEDURE — 80069 RENAL FUNCTION PANEL: CPT | Performed by: HOSPITALIST

## 2023-10-02 PROCEDURE — 2500000001 HC RX 250 WO HCPCS SELF ADMINISTERED DRUGS (ALT 637 FOR MEDICARE OP)

## 2023-10-02 PROCEDURE — 94003 VENT MGMT INPAT SUBQ DAY: CPT

## 2023-10-02 PROCEDURE — 99255 IP/OBS CONSLTJ NEW/EST HI 80: CPT

## 2023-10-02 PROCEDURE — 99291 CRITICAL CARE FIRST HOUR: CPT | Performed by: INTERNAL MEDICINE

## 2023-10-02 PROCEDURE — 2500000001 HC RX 250 WO HCPCS SELF ADMINISTERED DRUGS (ALT 637 FOR MEDICARE OP): Performed by: STUDENT IN AN ORGANIZED HEALTH CARE EDUCATION/TRAINING PROGRAM

## 2023-10-02 PROCEDURE — 83735 ASSAY OF MAGNESIUM: CPT | Performed by: HOSPITALIST

## 2023-10-02 PROCEDURE — 2500000004 HC RX 250 GENERAL PHARMACY W/ HCPCS (ALT 636 FOR OP/ED)

## 2023-10-02 PROCEDURE — 2500000005 HC RX 250 GENERAL PHARMACY W/O HCPCS: Performed by: STUDENT IN AN ORGANIZED HEALTH CARE EDUCATION/TRAINING PROGRAM

## 2023-10-02 RX ORDER — CEVIMELINE HYDROCHLORIDE 30 MG/1
1 CAPSULE ORAL 2 TIMES DAILY
COMMUNITY

## 2023-10-02 RX ORDER — HYOSCYAMINE SULFATE 0.125 MG
0.12 TABLET ORAL 2 TIMES DAILY
COMMUNITY

## 2023-10-02 RX ORDER — CHLORHEXIDINE GLUCONATE ORAL RINSE 1.2 MG/ML
15 SOLUTION DENTAL 2 TIMES DAILY
Status: DISCONTINUED | OUTPATIENT
Start: 2023-10-02 | End: 2023-10-02

## 2023-10-02 RX ORDER — DEXLANSOPRAZOLE 60 MG/1
60 CAPSULE, DELAYED RELEASE ORAL DAILY
COMMUNITY
End: 2023-10-14 | Stop reason: HOSPADM

## 2023-10-02 RX ORDER — METFORMIN HYDROCHLORIDE 500 MG/1
500 TABLET, EXTENDED RELEASE ORAL
COMMUNITY
End: 2023-10-14 | Stop reason: HOSPADM

## 2023-10-02 RX ORDER — GABAPENTIN 300 MG/1
CAPSULE ORAL
COMMUNITY

## 2023-10-02 RX ORDER — SPIRONOLACTONE 50 MG/1
50 TABLET, FILM COATED ORAL DAILY
COMMUNITY
End: 2023-10-14 | Stop reason: HOSPADM

## 2023-10-02 RX ORDER — URSODIOL 500 MG/1
TABLET, FILM COATED ORAL
COMMUNITY

## 2023-10-02 RX ADMIN — SENNOSIDES AND DOCUSATE SODIUM 1 TABLET: 50; 8.6 TABLET ORAL at 20:13

## 2023-10-02 RX ADMIN — HEPARIN SODIUM 5000 UNITS: 5000 INJECTION INTRAVENOUS; SUBCUTANEOUS at 00:27

## 2023-10-02 RX ADMIN — Medication 40 %: at 08:00

## 2023-10-02 RX ADMIN — DEXMEDETOMIDINE HYDROCHLORIDE 0.6 MCG/KG/HR: 4 INJECTION, SOLUTION INTRAVENOUS at 16:50

## 2023-10-02 RX ADMIN — GABAPENTIN 300 MG: 250 SOLUTION ORAL at 08:16

## 2023-10-02 RX ADMIN — ESOMEPRAZOLE MAGNESIUM 40 MG: 40 FOR SUSPENSION ORAL at 08:16

## 2023-10-02 RX ADMIN — HEPARIN SODIUM 5000 UNITS: 5000 INJECTION INTRAVENOUS; SUBCUTANEOUS at 16:50

## 2023-10-02 RX ADMIN — DEXMEDETOMIDINE HYDROCHLORIDE 0.5 MCG/KG/HR: 4 INJECTION, SOLUTION INTRAVENOUS at 00:28

## 2023-10-02 RX ADMIN — POTASSIUM PHOSPHATE, MONOBASIC POTASSIUM PHOSPHATE, DIBASIC 15 MMOL: 224; 236 INJECTION, SOLUTION, CONCENTRATE INTRAVENOUS at 14:20

## 2023-10-02 RX ADMIN — DEXMEDETOMIDINE HYDROCHLORIDE 0.81 MCG/KG/HR: 4 INJECTION, SOLUTION INTRAVENOUS at 23:00

## 2023-10-02 RX ADMIN — GABAPENTIN 900 MG: 250 SOLUTION ORAL at 20:13

## 2023-10-02 RX ADMIN — HEPARIN SODIUM 5000 UNITS: 5000 INJECTION INTRAVENOUS; SUBCUTANEOUS at 08:16

## 2023-10-02 SDOH — ECONOMIC STABILITY: FOOD INSECURITY: WITHIN THE PAST 12 MONTHS, YOU WORRIED THAT YOUR FOOD WOULD RUN OUT BEFORE YOU GOT MONEY TO BUY MORE.: NEVER TRUE

## 2023-10-02 SDOH — SOCIAL STABILITY: SOCIAL INSECURITY: WITHIN THE LAST YEAR, HAVE YOU BEEN AFRAID OF YOUR PARTNER OR EX-PARTNER?: NO

## 2023-10-02 SDOH — ECONOMIC STABILITY: HOUSING INSECURITY: IN THE LAST 12 MONTHS, HOW MANY PLACES HAVE YOU LIVED?: 1

## 2023-10-02 SDOH — ECONOMIC STABILITY: FOOD INSECURITY: WITHIN THE PAST 12 MONTHS, THE FOOD YOU BOUGHT JUST DIDN'T LAST AND YOU DIDN'T HAVE MONEY TO GET MORE.: NEVER TRUE

## 2023-10-02 SDOH — ECONOMIC STABILITY: INCOME INSECURITY: IN THE LAST 12 MONTHS, WAS THERE A TIME WHEN YOU WERE NOT ABLE TO PAY THE MORTGAGE OR RENT ON TIME?: NO

## 2023-10-02 SDOH — SOCIAL STABILITY: SOCIAL INSECURITY: WITHIN THE LAST YEAR, HAVE YOU BEEN HUMILIATED OR EMOTIONALLY ABUSED IN OTHER WAYS BY YOUR PARTNER OR EX-PARTNER?: NO

## 2023-10-02 NOTE — CONSULTS
Reason For Consult  2-3 weeks of generalized ascending weakness    History Of Present Illness  Bisi Mckay is a 63 y.o. female with PMHx of primary biliary cirrhosis, HTN, GERD, DM, breast cancer, and osteoarthritis, who [facility] and [facility]. She was then transferred to  MICU for acute hypoxic respiratory failure and rheumatologic/neuromuscular consult.    Per excellent H&P, patient was having ascending weakness over the past several weeks, with difficulty climbing stairs, getting up from seated position, combing hair, and holding a spoon. There was also documentation of dysarthria. She denied numbness or tingling. Recent history of URI symptoms two weeks ago but home COVID test was negative. Recent travel to [place], [place] one month ago, after which she was noted to be more fatigued and weak. MRI ordered 9/10 was unremarkable. Son found her down at home for a few hours on 9/25, and patient was admitted to medicine for rhabdo (Cr 1.75, CK 2880). Hospital course c/b UTI, worsening SKIP 2/2 rhabdo (CK peaked at 11.773), hypoxia, and dysphagia. She was intubated and transferred to the ICU.    On interview, patient was intubated but able to cooperate with physical exam. Son at bedside endorsed the above history. They denied any prior incidents of similar ascending weakness, h/o neurological diagnoses, h/o stroke, h/o seizure, h/o anticoagulation.     Past Medical History  She has a past medical history of Abnormal liver enzymes, Arthritis, Breast cancer (CMS/HCC), Constipation, Diabetes mellitus, type 2 (CMS/HCC), GERD (gastroesophageal reflux disease), Hypertension, Irritable bowel syndrome, Muscle weakness, and Neuropathy.    Surgical History  She has a past surgical history that includes Carpal tunnel release (N/A, 2016); Abdominal adhesion surgery (09/24/2014); Appendectomy (09/24/2014); Cholecystectomy (11/01/2012); Breast lumpectomy (06/2012); Hysterectomy  (04/01/2007); and Bladder suspension (04/01/2007).     Social History  She reports that she has never smoked. She does not have any smokeless tobacco history on file. She reports current alcohol use. She reports that she does not use drugs.    Family History  Family History   Problem Relation Name Age of Onset    Breast cancer Mother      Diabetes Mother      Hypertension Mother      Hyperlipidemia Mother      Cancer Father      Hypertension Father      Pancreatic cancer Father      Stomach cancer Father      Diabetes Sister      Hypertension Sister      Asthma Son          Allergies  Bactrim [sulfamethoxazole-trimethoprim]    Review of Systems  As per HPI     Physical Exam  GENERAL APPEARANCE:  Alert, interactive, cooperative, intubated, with multiple lines     MENTAL STATE:   Patient was able to appropriately confirm history and follow commands by nodding and shaking her head, however unable to verbalize answers at this time due to intubation.    CRANIAL NERVES:   CN 3, 4, 6   Pupils round, 4 mm in diameter, equally reactive to light. Lids symmetric; no ptosis. Able to maintain upward gaze for >30 seconds. EOMs normal alignment, full range with normal saccades, pursuit and convergence. No nystagmus.   CN 5   Facial sensation intact bilaterally.   CN 7   Normal and symmetric facial strength. Nasolabial folds symmetric.   CN 8   Hearing intact to conversation.  CN 9/10  Unable to assess due to intubation   CN 11   Unable to assess due to intubation  CN 12   Unable to assess due to intubation    MOTOR:   Exam limited by two point restraints around hands. Edematous extremities.  No fasciculations, tremor or other abnormal movements were present.                         R          L  Deltoids       Deferred due to restraints   Biceps           4          4  Triceps          4          4  Wrist Flex     Deferred due to restraints  Wrist Ext       Deferred due to restraints   strength  5         5    Hip Flex          "2          2  Knee Flex      4          4  Knee Ext        4          4  Dorsiflex        5          5  Plantarflex      5          5    REFLEXES:                       R          L  BR:               2         2  Biceps:         2          2  Triceps:        2          2  Knee:            2          2  Ankle:          -          -    Babinski: toes didn't move to plantar stimulation.    SENSORY:   In both upper and lower extremities, sensation was intact to light touch    COORDINATION:    CHIO of fingers intact bilaterally    GAIT:   Did not assess due to intubation    Last Recorded Vitals  Blood pressure 119/52, pulse 72, temperature 35.8 °C (96.4 °F), resp. rate 16, height 1.651 m (5' 5\"), weight 109 kg (240 lb 11.9 oz), SpO2 99 %.    Relevant Results  - Pending MRI brain with and without  - Pending MR cervical, lumbar, and thoracic spine with and without     Assessment/Plan   Bisi Mckay is a 63 y.o. female with PMHx of primary biliary cirrhosis, HTN, GERD, DM, breast cancer, and osteoarthritis, who initially presented to [facility] on 9/25 for weakness and fall. She was then transferred to  MICU for acute hypoxic respiratory failure and rheumatologic/neuromuscular consult.    Pattern of ascending weakness reported in history with preceding URI symptoms and bulbar weakness requiring intubation is strongly suggestive of neuropathy vs NMJ disorder. However, neurological exam without areflexia or hyporeflexia and only with proximal weakness 2/5 of bilateral hip flexors. Patient was also able to sustain upward gaze >30 seconds. Neurological exam could also be user dependent and limited by current 2-point soft restraints, so senior resident will reassess. Other consideration on differential would be undifferentiated myopathy.    Plan:  - Will staff with attending in AM    Sherita Segal MD  Adult Psychiatry PGY-2    ======  Neurology Senior Resident Attestation and Addendum:  I personally saw and examined this " patient, obtaining key portions of the history. I have read and edited the above note as above and agree with the assessment and plan. Briefly, this is a 63-year-old female with a history of PBC and breast cancer who is admitted to the [facility] MICU for acute hypoxemic respiratory failure.  Neurology is consulted for evaluation of weakness.  Patient's history is further notable for CK up to the 10,000's thought secondary to rhabdomyolysis after being down from a fall.  Objective assessment reveals proximal pattern of weakness chiefly involving deltoids and hip flexors and less effect on triceps, quadriceps, and hamstrings.  Hands and feet are essentially spared.  While unable to test bulbar function, there is no evidence for ocular involvement.  Reflexes are all intact. Overall pattern of weakness localizes best to the anterior horn cell or below, with eureflexia suggestive of NMJ-opathy or myopathy.  Of the two, myopathy is preferred given elevated CK and relative sparing of ocular bulbar structures.  Agree with current work-up as ordered to include cholinesterase receptor antibodies and rheumatologic labs (note that anti-Jo1 is negative).  Should also send antimitochondrial antibody given known prior PBC.  Will follow work-up and suggest additional measures as it returns.      Thank you for this consult. The patient's case will be staffed with the attending physician in the morning. Please note that these are preliminary recommendations; they are not finalized until this note is signed by the attending.    Shaq New MD PGY-3  [facility] Inpatient Neurology Team  General Neurology Pager [phone]

## 2023-10-02 NOTE — PROGRESS NOTES
"Bisi Mckay is a 63 y.o. female on day 1 of admission presenting with Acute hypoxic respiratory failure (CMS/HCC).    Subjective   Patient intubated and alert this morning. Able to answer some questions with slight nodding and attempts to mouth words. When asked whether she has rheumatoid arthritis or osteoarthritis, patient mouths \"both.\"    Son is at bedside. Both patient and son apprised of current plan. Son has brought the card for patient's bladder stimulation device. States he can bring the devise's remote tomorrow, but that the device company has told him that it is safe for MRI even without needing to deactivate it. He is uncertain why it was placed a couple of years ago but believes it was due to patient experiencing urinary frequency.       Objective     Physical Exam  Constitutional:       General: She is not in acute distress.     Appearance: Normal appearance.      Interventions: She is intubated.   HENT:      Head: Normocephalic and atraumatic.      Mouth/Throat:      Mouth: Mucous membranes are moist.      Pharynx: Oropharynx is clear. No oropharyngeal exudate or posterior oropharyngeal erythema.   Eyes:      General: No scleral icterus.     Extraocular Movements: Extraocular movements intact.      Conjunctiva/sclera: Conjunctivae normal.      Pupils: Pupils are equal, round, and reactive to light.   Cardiovascular:      Rate and Rhythm: Normal rate and regular rhythm.      Pulses: Normal pulses.      Heart sounds: Normal heart sounds.   Pulmonary:      Effort: She is intubated.      Breath sounds: Coarse breath sounds.  Abdominal:      General: Abdomen is flat. Bowel sounds are normal.      Palpations: Abdomen is soft.      Tenderness: There is no abdominal tenderness. There is no guarding or rebound.   Musculoskeletal:         General: No tenderness or signs of injury.      Cervical back: Normal range of motion.      Right lower leg: No edema.      Left lower leg: No edema.   Skin:     General: " "Skin is warm and dry.      Coloration: Skin is not jaundiced or pale.      Findings: No bruising, erythema, lesion or rash.   Neurological:      Mental Status: She is alert.      Motor: Weakness present. Neck flexion 2-3/5. Upper and lower extremities 3-4/5  Psychiatric:     Mood and Affect: Mood normal.         Behavior: Behavior normal.      Last Recorded Vitals  Blood pressure 119/52, pulse 74, temperature 35.8 °C (96.4 °F), resp. rate 16, height 1.651 m (5' 5\"), weight 109 kg (240 lb 11.9 oz), SpO2 99 %.  Intake/Output last 3 Shifts:  I/O last 3 completed shifts:  In: 1107 (10.1 mL/kg) [I.V.:1037 (9.5 mL/kg); NG/GT:70]  Out: 525 (4.8 mL/kg) [Urine:525 (0.1 mL/kg/hr)]  Weight: 109.2 kg     Relevant Results              Results for orders placed or performed during the hospital encounter of 10/01/23 (from the past 24 hour(s))   Blood Gas Venous Full Panel   Result Value Ref Range    POCT pH, Venous 7.48 (H) 7.33 - 7.43 pH    POCT pCO2, Venous 35 (L) 41 - 51 mm Hg    POCT pO2, Venous 68 (H) 35 - 45 mm Hg    POCT SO2, Venous 93 (H) 45 - 75 %    POCT Oxy Hemoglobin, Venous 89.7 (H) 45.0 - 75.0 %    POCT Hematocrit Calculated, Venous 29.0 (L) 36.0 - 46.0 %    POCT Sodium, Venous >180 (HH) 136 - 145 mmol/L    POCT Potassium, Venous      POCT Chloride, Venous 125 (H) 98 - 107 mmol/L    POCT Ionized Calicum, Venous      POCT Glucose, Venous 40 (LL) 74 - 99 mg/dL    POCT Lactate, Venous 1.2 0.4 - 2.0 mmol/L    POCT Base Excess, Venous 2.6 -2.0 - 3.0 mmol/L    POCT HCO3 Calculated, Venous 26.1 (H) 22.0 - 26.0 mmol/L    POCT Hemoglobin, Venous 9.5 (L) 12.0 - 16.0 g/dL    POCT Anion Gap, Venous      Patient Temperature 37.0 degrees Celsius   Troponin I, High Sensitivity   Result Value Ref Range    Troponin I, High Sensitivity 48 (H) 0 - 34 ng/L   Magnesium   Result Value Ref Range    Magnesium 1.87 1.60 - 2.40 mg/dL   Phosphorus   Result Value Ref Range    Phosphorus 1.8 (L) 2.5 - 4.9 mg/dL   CBC and Auto Differential "   Result Value Ref Range    WBC 8.0 4.4 - 11.3 x10*3/uL    nRBC 0.0 0.0 - 0.0 /100 WBCs    RBC 3.39 (L) 4.00 - 5.20 x10*6/uL    Hemoglobin 10.1 (L) 12.0 - 16.0 g/dL    Hematocrit 31.1 (L) 36.0 - 46.0 %    MCV 92 80 - 100 fL    MCH 29.8 26.0 - 34.0 pg    MCHC 32.5 32.0 - 36.0 g/dL    RDW 14.5 11.5 - 14.5 %    Platelets 251 150 - 450 x10*3/uL    MPV 10.4 7.5 - 11.5 fL    Neutrophils % 80.6 40.0 - 80.0 %    Immature Granulocytes %, Automated 1.6 (H) 0.0 - 0.9 %    Lymphocytes % 10.6 13.0 - 44.0 %    Monocytes % 5.7 2.0 - 10.0 %    Eosinophils % 1.4 0.0 - 6.0 %    Basophils % 0.1 0.0 - 2.0 %    Neutrophils Absolute 6.48 1.20 - 7.70 x10*3/uL    Immature Granulocytes Absolute, Automated 0.13 0.00 - 0.70 x10*3/uL    Lymphocytes Absolute 0.85 (L) 1.20 - 4.80 x10*3/uL    Monocytes Absolute 0.46 0.10 - 1.00 x10*3/uL    Eosinophils Absolute 0.11 0.00 - 0.70 x10*3/uL    Basophils Absolute 0.01 0.00 - 0.10 x10*3/uL   Comprehensive metabolic panel   Result Value Ref Range    Glucose 48 (LL) 74 - 99 mg/dL    Sodium 150 (H) 136 - 145 mmol/L    Potassium 3.6 3.5 - 5.3 mmol/L    Chloride 115 (H) 98 - 107 mmol/L    Bicarbonate 27 21 - 32 mmol/L    Anion Gap 12 10 - 20 mmol/L    Urea Nitrogen 34 (H) 6 - 23 mg/dL    Creatinine 1.04 0.50 - 1.05 mg/dL    eGFR 60 (L) >60 mL/min/1.73m*2    Calcium 7.8 (L) 8.6 - 10.6 mg/dL    Albumin 2.0 (L) 3.4 - 5.0 g/dL    Alkaline Phosphatase 81 33 - 136 U/L    Total Protein 4.1 (L) 6.4 - 8.2 g/dL     (H) 9 - 39 U/L    Bilirubin, Total 0.5 0.0 - 1.2 mg/dL     (H) 7 - 45 U/L   Creatine Kinase   Result Value Ref Range    Creatine Kinase 7,961 (H) 0 - 215 U/L   Sedimentation rate, automated   Result Value Ref Range    Sedimentation Rate 30 0 - 30 mm/h   C-reactive protein   Result Value Ref Range    C-Reactive Protein 12.08 (H) <1.00 mg/dL   Coagulation Screen   Result Value Ref Range    Protime 11.7 9.8 - 12.8 seconds    INR 1.0 0.9 - 1.1    aPTT 20 (L) 27 - 38 seconds   Anti-Lamar 1 antibody,  IgG   Result Value Ref Range    Anti-INDIA-1 IgG <0.2 <1.0 AI   POCT GLUCOSE   Result Value Ref Range    POCT Glucose 75 74 - 99 mg/dL   POCT GLUCOSE   Result Value Ref Range    POCT Glucose 116 (H) 74 - 99 mg/dL   CBC   Result Value Ref Range    WBC 6.4 4.4 - 11.3 x10*3/uL    nRBC 0.0 0.0 - 0.0 /100 WBCs    RBC 3.09 (L) 4.00 - 5.20 x10*6/uL    Hemoglobin 9.3 (L) 12.0 - 16.0 g/dL    Hematocrit 29.3 (L) 36.0 - 46.0 %    MCV 95 80 - 100 fL    MCH 30.1 26.0 - 34.0 pg    MCHC 31.7 (L) 32.0 - 36.0 g/dL    RDW 14.6 (H) 11.5 - 14.5 %    Platelets 212 150 - 450 x10*3/uL    MPV 10.3 7.5 - 11.5 fL   Magnesium   Result Value Ref Range    Magnesium 1.88 1.60 - 2.40 mg/dL   Renal Function Panel   Result Value Ref Range    Glucose 104 (H) 74 - 99 mg/dL    Sodium 146 (H) 136 - 145 mmol/L    Potassium 3.9 3.5 - 5.3 mmol/L    Chloride 113 (H) 98 - 107 mmol/L    Bicarbonate 26 21 - 32 mmol/L    Anion Gap 11 10 - 20 mmol/L    Urea Nitrogen 34 (H) 6 - 23 mg/dL    Creatinine 0.96 0.50 - 1.05 mg/dL    eGFR 66 >60 mL/min/1.73m*2    Calcium 7.9 (L) 8.6 - 10.6 mg/dL    Phosphorus 1.7 (L) 2.5 - 4.9 mg/dL    Albumin 1.9 (L) 3.4 - 5.0 g/dL   Renal function panel   Result Value Ref Range    Glucose 176 (H) 74 - 99 mg/dL    Sodium 144 136 - 145 mmol/L    Potassium 4.0 3.5 - 5.3 mmol/L    Chloride 111 (H) 98 - 107 mmol/L    Bicarbonate 25 21 - 32 mmol/L    Anion Gap 12 10 - 20 mmol/L    Urea Nitrogen 34 (H) 6 - 23 mg/dL    Creatinine 0.92 0.50 - 1.05 mg/dL    eGFR 69 >60 mL/min/1.73m*2    Calcium 7.6 (L) 8.6 - 10.6 mg/dL    Phosphorus 1.9 (L) 2.5 - 4.9 mg/dL    Albumin 1.9 (L) 3.4 - 5.0 g/dL   Magnesium   Result Value Ref Range    Magnesium 1.90 1.60 - 2.40 mg/dL   POCT GLUCOSE   Result Value Ref Range    POCT Glucose 180 (H) 74 - 99 mg/dL      This patient has a urinary catheter   Reason for the urinary catheter remaining today? critically ill patient who need accurate urinary output measurements    This patient is intubated   Reason for patient to  remain intubated today? they are unable to protect their airway           Assessment/Plan   Principal Problem:    Acute hypoxic respiratory failure (CMS/HCC)  62 yo F w/ PMHx primary biliary cirrhosis, HTN, GERD, DM, hx of breast cancer, and osteoarthritis presented to [facility] on 9/25/23 for weakness and falls [facility] for further workup of acute hypoxic respiratory failure and concern for neurological or rheumatological cause given failed liberation attempts at the OSH.      Neuro  - patient arrived alert  - on precedex     Cardiovascular  #Hx of Hypertension  - currently hypotensive  - holding home BP meds     Pulmonary  #Acute hypoxic respiratory failure  - patient intubated with minimal vent settings  - 450, 16, peep 5, 40%  - had been hypoxic/tachypneic at [facility]  - passed SBT today, but concern that we have not addressed main reason for mechanical ventilation  - plan for possible extubation after patient returns from imaging     GI  #Constipation  - bowel regimen     Renal/  #Neurogenic bladder?  - has bladder stimulating device  - specific requirements for MRI, son has card with device info  - Serial Number: [id number]  - Model Number 3058  - UXCam/mri, [id number]  - rae catheter     #Hypernatremia  - Na 150 -> 144  - D5 water 100ml/hr, discontinued    #Hypophosphatemia  - Phos 1.9, repleting     #SKIP, improving  - Cr 0.92     ID  - no known issues  - had received Doxycycline for lyme?  - WBC wnl     MSK/Skel  #Generalized weakness  R/o Guillaine [place], Lyme, MG, Polymyositis, MS  - Anti-Ach, Lyme 2 step, NORMA, aldolase, ANCA, Anti-SSA/SSB  - consulted neuro, appreciate recs  - consulting rheum, appreciate recs  - MRI brain/spine     #Rhabdomyolysis  - CK trending down  - 11,773 (9/30) -> 7,961 (10/1)  - elevated transaminases     Endo  #Hypoglycemia  #T2DM  - hold home metformin  - hypoglycemia protocol  - tube feeds initiated     Fluids: holding  Electrolytes: replete  prn  Nutrition: tube feed    GI Ppx: esomeprazole  Abx: none  Access: PIVs     Dispo: Pt is a 62yo F being treated in the MICU for acute hypoxic respiratory failure and ascending generalized weakness. Patient currently intubated with mechanical ventilation. Neuro and rheum consulted.       Regulo Cobos DO  Internal Medicine PGY1    ATTENDING ADDENDUM:    I have personally interviewed and examined the patient.  I have personally verified elements of the exam listed above. Interval changes or irregularities are as noted.  I have personally and independently reviewed laboratory, radiographic and procedural data.  I have personally reviewed the problem list above and concur. Changes, if any, are noted.  I have personally reviewed the plan list above and concur. Changes, if any, are noted.    EVENTS:      PROBLEM LIST:  Bisi Mckay is a 63 y.o. year old female patient with   admitted to the MICU for acute hypoxic respiratory failure requirin intubation 2/2 muscle weakness .    ATTESTATIONARJ2: she has impending or acute respiratory failure. We are treating with appropriate medications, ventilatory and/or oxygenating support, as indicated, as well as intensive monitoring.        MANAGEMENT PLAN:  - Will plan to consult neurology and rheumatology   - Imaging as detailed above  - SBT and assess readiness to extubate, will monitor closely and attempt to get MRI done prior to extubation attempt  - Routine ICU care    The patient has high probability of compromise including but not limited to organ system failure, mechanical respiratory and circulatory support, cardiac arrest and death. I have discussed this in detail with the family / caregivers.    I have personally spent 60 minutes of face to face critical care time (not including billable procedures billed separately) in taking care of the patient.

## 2023-10-02 NOTE — ED NOTES
Pharmacy Medication History Review    Bisi Mckay is a 63 y.o. female admitted for Acute hypoxic respiratory failure (CMS/[facility]). Pharmacy reviewed the patient's [id number] medications and allergies for accuracy.    The list below reflectives the updated PTA list. Please review each medication in order reconciliation for additional clarification and justification.  Medications Prior to Admission   Medication Sig Dispense Refill Last Dose    cevimeline (Evoxac) 30 mg capsule Take 1 capsule (30 mg) by mouth 2 times a day.   Past Week    dexlansoprazole (Dexilant) 60 mg DR capsule Take 1 capsule (60 mg) by mouth once daily. Do not crush or chew.   Past Week    gabapentin (Neurontin) 300 mg capsule Take 1 capsule (300 mg) by mouth every morning & take 3 capsules (900 mg) by mouth every night at bedtime.   Past Week    hyoscyamine (Anaspaz, Levsin) 0.125 mg tablet Take 1 tablet (0.125 mg) by mouth 2 times a day.   Past Week    metFORMIN  mg 24 hr tablet Take 1 tablet (500 mg) by mouth once daily in the evening. Take with meals. Do not crush, chew, or split.   Past Week    plecanatide (Trulance) tablet tablet Take 1 tablet (3 mg) by mouth once daily.   Past Week    spironolactone (Aldactone) 50 mg tablet Take 1 tablet (50 mg) by mouth once daily.   Past Week    ursodiol (Actigall) 500 mg tablet Take 2 tablets (1,000 mg) by mouth every morning & take 1 tablet (500 mg) by mouth every night at bedtime.   Past Week        The list below reflectives the updated allergy list. Please review each documented allergy for additional clarification and justification.  Allergies  Reviewed by Dinorah Kenney, DianeD on 10/2/2023        Severity Reactions Comments    Bactrim [sulfamethoxazole-trimethoprim] Medium Other, Fever, Chills Chills, fever, flu-like symptoms - Per [facility] medical record          Sources: SureScripts, [facility], [facility] medical record    Additional Comments:   Patient was  intubated at time of attempted interview  Medications added to PTA medication list using SureScripts and recent dispense history from [facility]    The following prescription and OTC medications were included in home medication list provided by [facility] medical record:  Metronidazole 500 mg Q12H  Ciprofloxacin 500 mg Q12H  Calcium + vitamin D tablet QAM  Cetirizine 10 mg tablet QAM  Coenzyme Q10 60 mg tablet BID  Colace 100 mg capsule BID PRN  Magnesium 250 mg tablet every day  Milk thistle oral capsule  Niacinamide 500 mg tablet every other day  Prenatal multivitamin every day  Slow Fe 160 mg ER tablet Mon/Wed/Fri  Vitamin B12 1,000 mcg 2 tablets QAM  Vitamin C 1,000 mg tablet 2 tablets BID  Vitamin D3 5,000 units QAM  Zinc 140 mg tablet Tues/Sat    *There is no recent dispense history for these medications, so they could not be confirmed/added to PTA medication list    Dinorah Kenney, PharmD  [facility] PGY-1 Community Pharmacy Resident  Medication reconciliation complete  Please reach out via Epic Chat for questions, or if no response call [phone] or [place] [facility]  [facility]

## 2023-10-02 NOTE — CARE PLAN
The patient's goals for the shift include  (can not verbalize - ett)    The clinical goals for the shift include manage pain and restlessness    Over the shift, the patient did not make progress toward the following goals. Barriers to progression include patient rass +2, restless. Recommendations to address these barriers include add pain medication to med regimen.

## 2023-10-02 NOTE — PROGRESS NOTES
10/02/23 1435   Discharge Planning   Living Arrangements Alone   Support Systems Children   Assistance Needed independent   Type of Residence Private residence   Home or Post Acute Services Post acute facilities (Rehab/SNF/etc)   Type of Post Acute Facility Services Skilled nursing   Patient expects to be discharged to: Per son, plan for [place] was to discharge to [facility]   Does the patient need discharge transport arranged? Yes   RoundTrip coordination needed? Yes     Patient currently intubated. NAYELY spoke with son, Paul ([phone]) who is reportedly POA. He plans to bring or email paperwork tomorrow. Patient originally lived at home alone. She is independent at baseline. Son hopes patient an be discharged to [facility] when ready. Social work to follow.   Daniela Ochoa, MICHELLE, LISW-S ([id number])     UPDATE: Registered as self-pay. HRS notified. Medicaid per son.

## 2023-10-02 NOTE — CARE PLAN
The patient's goals for the shift include      The clinical goals for the shift include unable to assess- sedated & intubated.    Over the shift, the patient did not make progress toward the following goals. Barriers to progression include n/a. Recommendations to address these barriers include n/a.

## 2023-10-02 NOTE — HOSPITAL COURSE
62 yo F w/ PMHx primary biliary cirrhosis, HTN, GERD, DM, hx of breast cancer s/p lumpectomy and radiation, and osteoarthritis presented to [place] on 9/25/23 for weakness and falls [facility] for further workup of acute hypoxic respiratory failure suspected s/t muscular weakness and rheumatologic/neurologic work-up.    Patient reports she first started noticing weakness following a trip to [place]. On the 23rd of September she had a fall and later on the 25th she was found down after hours of being on the ground next to her bed, without losing consciousness. She was admitted to medicine for rhabdo (Cr 1.75, CK 2880). Hospital course c/b UTI, worsening SKIP 2/2 rhabdo (CK peaked at 11.773). On the 28th developed respiratory failure and got intubated and transferred to the ICU. Patient extubated 10/3. Transferred to floor 10/4 for further work-up of weakness.     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 Got first dose on IVIG 10/6 and started solu-medrol 60 IV daily per rheum. 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 IR-guided muscle biopsy completed 10/11. Patient discharged with significant improvement in upper extremity strength. HR chest CT and CT A/P completed for screening prior to initiation of further immunosuppression. She will continue steroids outpatient and follow-up with neuromuscular and rheumatology for further management.    Follow-up:  PCP- DM, HTN  Rheum- biopsy results, extended myositis panel, HMG-CoAR Ab, T-spot and TPMT enzyme levels, further management of inflammatory myopathy  Neuro- HMG-CoAR Ab results, further management of inflammatory myopathy

## 2023-10-03 ENCOUNTER — APPOINTMENT (OUTPATIENT)
Dept: RADIOLOGY | Facility: HOSPITAL | Age: 63
DRG: 982 | End: 2023-10-03
Payer: MEDICARE

## 2023-10-03 LAB
ALBUMIN SERPL BCP-MCNC: 2 G/DL (ref 3.4–5)
ALDOLASE SERPL-CCNC: 66.1 U/L (ref 1.2–7.6)
ANION GAP SERPL CALC-SCNC: 14 MMOL/L (ref 10–20)
BUN SERPL-MCNC: 35 MG/DL (ref 6–23)
CALCIUM SERPL-MCNC: 7.7 MG/DL (ref 8.6–10.6)
CHLORIDE SERPL-SCNC: 110 MMOL/L (ref 98–107)
CO2 SERPL-SCNC: 23 MMOL/L (ref 21–32)
CREAT SERPL-MCNC: 0.93 MG/DL (ref 0.5–1.05)
ERYTHROCYTE [DISTWIDTH] IN BLOOD BY AUTOMATED COUNT: 14.1 % (ref 11.5–14.5)
ERYTHROCYTE [SEDIMENTATION RATE] IN BLOOD BY WESTERGREN METHOD: 26 MM/H (ref 0–30)
GFR SERPL CREATININE-BSD FRML MDRD: 68 ML/MIN/1.73M*2
GLUCOSE SERPL-MCNC: 185 MG/DL (ref 74–99)
HCT VFR BLD AUTO: 29.1 % (ref 36–46)
HGB BLD-MCNC: 9.8 G/DL (ref 12–16)
MAGNESIUM SERPL-MCNC: 1.79 MG/DL (ref 1.6–2.4)
MCH RBC QN AUTO: 29.7 PG (ref 26–34)
MCHC RBC AUTO-ENTMCNC: 33.7 G/DL (ref 32–36)
MCV RBC AUTO: 88 FL (ref 80–100)
NRBC BLD-RTO: 0 /100 WBCS (ref 0–0)
PHOSPHATE SERPL-MCNC: 3 MG/DL (ref 2.5–4.9)
PLATELET # BLD AUTO: 286 X10*3/UL (ref 150–450)
PMV BLD AUTO: 9.5 FL (ref 7.5–11.5)
POTASSIUM SERPL-SCNC: 4.4 MMOL/L (ref 3.5–5.3)
RBC # BLD AUTO: 3.3 X10*6/UL (ref 4–5.2)
SODIUM SERPL-SCNC: 143 MMOL/L (ref 136–145)
WBC # BLD AUTO: 10.5 X10*3/UL (ref 4.4–11.3)

## 2023-10-03 PROCEDURE — 99255 IP/OBS CONSLTJ NEW/EST HI 80: CPT | Performed by: STUDENT IN AN ORGANIZED HEALTH CARE EDUCATION/TRAINING PROGRAM

## 2023-10-03 PROCEDURE — 2500000001 HC RX 250 WO HCPCS SELF ADMINISTERED DRUGS (ALT 637 FOR MEDICARE OP)

## 2023-10-03 PROCEDURE — 36415 COLL VENOUS BLD VENIPUNCTURE: CPT

## 2023-10-03 PROCEDURE — 96372 THER/PROPH/DIAG INJ SC/IM: CPT

## 2023-10-03 PROCEDURE — 92610 EVALUATE SWALLOWING FUNCTION: CPT | Mod: GN | Performed by: SPEECH-LANGUAGE PATHOLOGIST

## 2023-10-03 PROCEDURE — 2500000005 HC RX 250 GENERAL PHARMACY W/O HCPCS: Performed by: STUDENT IN AN ORGANIZED HEALTH CARE EDUCATION/TRAINING PROGRAM

## 2023-10-03 PROCEDURE — 2500000004 HC RX 250 GENERAL PHARMACY W/ HCPCS (ALT 636 FOR OP/ED): Performed by: STUDENT IN AN ORGANIZED HEALTH CARE EDUCATION/TRAINING PROGRAM

## 2023-10-03 PROCEDURE — 2500000005 HC RX 250 GENERAL PHARMACY W/O HCPCS: Performed by: INTERNAL MEDICINE

## 2023-10-03 PROCEDURE — 85652 RBC SED RATE AUTOMATED: CPT

## 2023-10-03 PROCEDURE — 3E0G76Z INTRODUCTION OF NUTRITIONAL SUBSTANCE INTO UPPER GI, VIA NATURAL OR ARTIFICIAL OPENING: ICD-10-PCS

## 2023-10-03 PROCEDURE — 83735 ASSAY OF MAGNESIUM: CPT

## 2023-10-03 PROCEDURE — 99291 CRITICAL CARE FIRST HOUR: CPT

## 2023-10-03 PROCEDURE — 5A1945Z RESPIRATORY VENTILATION, 24-96 CONSECUTIVE HOURS: ICD-10-PCS

## 2023-10-03 PROCEDURE — 86235 NUCLEAR ANTIGEN ANTIBODY: CPT

## 2023-10-03 PROCEDURE — 83516 IMMUNOASSAY NONANTIBODY: CPT

## 2023-10-03 PROCEDURE — 2500000004 HC RX 250 GENERAL PHARMACY W/ HCPCS (ALT 636 FOR OP/ED)

## 2023-10-03 PROCEDURE — 43761 REPOSITION GASTROSTOMY TUBE: CPT | Performed by: NURSE PRACTITIONER

## 2023-10-03 PROCEDURE — 74018 RADEX ABDOMEN 1 VIEW: CPT | Performed by: STUDENT IN AN ORGANIZED HEALTH CARE EDUCATION/TRAINING PROGRAM

## 2023-10-03 PROCEDURE — 85027 COMPLETE CBC AUTOMATED: CPT

## 2023-10-03 PROCEDURE — 74018 RADEX ABDOMEN 1 VIEW: CPT | Performed by: RADIOLOGY

## 2023-10-03 PROCEDURE — 2500000001 HC RX 250 WO HCPCS SELF ADMINISTERED DRUGS (ALT 637 FOR MEDICARE OP): Performed by: STUDENT IN AN ORGANIZED HEALTH CARE EDUCATION/TRAINING PROGRAM

## 2023-10-03 PROCEDURE — 74018 RADEX ABDOMEN 1 VIEW: CPT

## 2023-10-03 PROCEDURE — 94003 VENT MGMT INPAT SUBQ DAY: CPT

## 2023-10-03 PROCEDURE — 80069 RENAL FUNCTION PANEL: CPT

## 2023-10-03 PROCEDURE — 2020000001 HC ICU ROOM DAILY

## 2023-10-03 PROCEDURE — 74018 RADEX ABDOMEN 1 VIEW: CPT | Mod: FY

## 2023-10-03 RX ORDER — LACTULOSE 10 G/15ML
20 SOLUTION ORAL DAILY
Status: DISCONTINUED | OUTPATIENT
Start: 2023-10-03 | End: 2023-10-04

## 2023-10-03 RX ORDER — PETROLATUM 420 MG/G
OINTMENT TOPICAL
Status: COMPLETED
Start: 2023-10-03 | End: 2023-10-03

## 2023-10-03 RX ORDER — LACTULOSE 10 G/15ML
20 SOLUTION ORAL ONCE
Status: DISCONTINUED | OUTPATIENT
Start: 2023-10-03 | End: 2023-10-03

## 2023-10-03 RX ADMIN — HEPARIN SODIUM 5000 UNITS: 5000 INJECTION INTRAVENOUS; SUBCUTANEOUS at 09:16

## 2023-10-03 RX ADMIN — DEXMEDETOMIDINE HYDROCHLORIDE 0.8 MCG/KG/HR: 4 INJECTION, SOLUTION INTRAVENOUS at 02:03

## 2023-10-03 RX ADMIN — HEPARIN SODIUM 5000 UNITS: 5000 INJECTION INTRAVENOUS; SUBCUTANEOUS at 18:00

## 2023-10-03 RX ADMIN — PETROLATUM: 1 OINTMENT TOPICAL at 12:15

## 2023-10-03 RX ADMIN — LACTULOSE 20 G: 20 SOLUTION ORAL at 13:53

## 2023-10-03 RX ADMIN — DEXMEDETOMIDINE HYDROCHLORIDE 0.8 MCG/KG/HR: 4 INJECTION, SOLUTION INTRAVENOUS at 09:15

## 2023-10-03 RX ADMIN — Medication: at 08:00

## 2023-10-03 RX ADMIN — Medication 4 L/MIN: at 18:00

## 2023-10-03 RX ADMIN — POLYETHYLENE GLYCOL 3350 17 G: 17 POWDER, FOR SOLUTION ORAL at 09:16

## 2023-10-03 RX ADMIN — ESOMEPRAZOLE MAGNESIUM 40 MG: 40 FOR SUSPENSION ORAL at 09:16

## 2023-10-03 RX ADMIN — HEPARIN SODIUM 5000 UNITS: 5000 INJECTION INTRAVENOUS; SUBCUTANEOUS at 01:17

## 2023-10-03 RX ADMIN — GABAPENTIN 300 MG: 250 SOLUTION ORAL at 09:17

## 2023-10-03 ASSESSMENT — PAIN - FUNCTIONAL ASSESSMENT
PAIN_FUNCTIONAL_ASSESSMENT: 0-10

## 2023-10-03 ASSESSMENT — PAIN SCALES - GENERAL
PAINLEVEL_OUTOF10: 0 - NO PAIN

## 2023-10-03 NOTE — PROGRESS NOTES
Speech-Language Pathology    Inpatient Speech-Language Pathology Clinical Swallow Evaluation    Patient Name: Bisi Mckay  MRN: [id number]  Today's Date: 10/3/2023   Time Calculation  Start Time: 1300  Stop Time: 1330  Time Calculation (min): 30 min         Current Problem:   Patient Active Problem List   Diagnosis    Acute hypoxic respiratory failure (CMS/HCC)         Recommendations:  Ongoing assessment at bedside with SLP- consider MBS or FEES as clinically indicated; enteral nutrition  Liquid Diet Recommendations: NPO, Ice chips after oral care  Follow up treatments: Diet tolerance monitoring      Assessment:    SLP consulted for clinical swallow exam, s/p extubation in setting of acute respiratory failure preceded by onset of progressive weakness. Intubated 10/1 - 10/3. Corpak placed earlier today.   Clinically, pt showed intact oral-motor function without asymmetries. Speech dysphonic (? D/t S/p extubation) characterized by weakness and breathiness. Speech fluency was characterized by slower rate and ? Hypernasal quality ( she did demonstrate ability to adjust pitch during phonation). She demonstrated capacity to accept small ice chips and water trials via tsp and straw without loss of bolus from mouth or overt clinical indicators of aspiration. However, pt was unable to complete 3 oz protocol due to difficulty coordinating sip-swallow despite two attempts. Subjectively, pharyngeal swallow appeared timely however, given the limitations of the bedside exam, unable to conclusively rule a pharyngeal impairment or covert aspiration. (the 3 oz sequential drinks of thin liquid water was utilized as a reliable, evidence based test to rule out silent aspiration and determine need for additional testing, such as the MBS or FEES (fiberoptic endoscopic evaluation of swallow), if the test is equivocal, incomplete or pt shows s/sx of aspiration,  prior to recommending a oral diet)      Plan:  Inpatient/Swing Bed or  Outpatient: Inpatient  SLP Plan: Skilled SLP  SLP Frequency: 3x per week  Duration: 2 weeks  Diet Recommendations:  (NPO- ice chips (5/hour); aggressive oral care)  Discussed POC: Patient, Caregiver/family  Discussed Risks/Benefits: Yes, Patient, Caregiver/Family  Patient/Caregiver Agreeable: Yes      Subjective   Current Problem:  Bisi Mckay is a 63 y.o. female with PMHx of primary biliary cirrhosis, HTN, GERD, DM, breast cancer, and osteoarthritis, who initially presented to [facility] on 9/25 for weakness and fall. She was then transferred to  MICU for acute hypoxic respiratory failure and rheumatologic/neuromuscular consult.   Pt resting in bed; A/O x 3; pleasant and cooperative. Began experiencing subtle difficulties in swallowing several weeks prior to admission. She believed this was one of the first signs she noticed.   Has DHT for TF, placed earlier today.         General Visit Information:  Patient Class: Inpatient  Living Environment: Home  Ordering Physician:  (MICU team)  Reason for Referral: s/p extubation; progressive swallow/speech changes  Prior Level of Function: WFL  Developmental Status: Age Appropriate  Patient Seen During This Visit: Yes          Objective       Baseline Assessment:  History of Intubation: Yes  Length of Intubations (days): 2 days  Date extubated: 10/03/23  Behavior/Cognition: Alert, Cooperative, Pleasant mood  Patient Positioning: Upright in Bed  Baseline Vocal Quality: Dysphonic, Weak  Volitional Cough: Weak  Volitional Swallow: Within Functional Limits        Oral/Motor Assessment:  Oral Hygiene: WFL  Dentition: Adequate/Natural  Oral Motor: Within Functional Limits  Vocal Quality: Exceptions to WFL  Vocal Quality Impairment: Breathy, Weak  Intelligibility: Intelligibility reduced  Breath Support:  (incoordination of speech with respiration; shortened phrases)        Clinical Observations:  Patient Positioning: Upright in Bed  Management of Oral Secretions:  Adequate  Suck Swallow Breathe Coordination: Functional  Was The 3 oz Swallow Protocol Completed: Yes (Frequent pausing; suspect incoordination sip/swallow)        Inpatient:  Education Documentation  No documentation found.  Education Comments  No comments found.

## 2023-10-03 NOTE — CONSULTS
Reason For Consult  Proximal weakness with elevated CPK    History Of Present Illness  Bisi Mckay is a 63 y.o. female with PMHx of primary biliary cirrhosis, HTN, GERD, DM, breast cancer, and osteoarthritis, RA? Not on treatment, who initially presented to [facility] on 9/25 for weakness and fall. She was then transferred to  MICU for acute hypoxic respiratory failure and rheumatologic/neuromuscular consult.    Patient was in [place] a month ago an reported feeling sore all over and fatigued with reported having some difficulty swallowing at that time. 2 weeks ago developed URI sxs and tested negative for COVID-19. Around that time her family reports her having progressive weakness with difficulty climbing stairs, getting up from seated position, combing hair, and holding a spoon.   On the 23rd of September she had a fall and later on the 25th she was found down after hours of being on the ground next to her bed, without losing consciousness.   She was admitted to medicine for rhabdo (Cr 1.75, CK 2880). Hospital course c/b UTI, worsening SKIP 2/2 rhabdo (CK peaked at 11.773).  On the 28th developed respiratory failure and got intubated and transferred to the ICU.      Patient denies SOB prior to this event, no rashes, no raynaud's no mouth or nose ulcers, no ocular sxs.   No family hx of Rheumatic disease.     On Arrival to [facility]:  VS: T 37.1  /58  RR 16 O2 94% on RA    Labs:  CBC WBC 12.6 Hb 13.2 Plt 178  RFP Na 135 K 4.3 Cl 105 HCO3 17 BUN 36 SCr 1.75 (on admit) -> 2.5 (peak) Glc 253 Ca 9.2   LFTs AST//78 Alk Phos 99 T Bili 1 Alb 3.0 TProt 4.9 164  AST  Albumin 2   Coags INR 1.0 PT 12 (9/29)  COVID Negative  CK 2880 (9/25) -> 11,773 (9/30)à7,961   Trop 296 (9/25) -> 457 -> 461 -> 337 (9/28)  Lactate 1.7  Ammonia 31  ESR: 68à30  CRP: 29à 12 mg /dl  TSH: 0.032 (low); T4 1.28 (wnl); anti TPO ab (<28); nml  B12: >2000  C3 132, C4 56 (high)  Ceruloplasmin 37 (wnl)  Cryoglobulin neg   AM  cortisol: wnl   LDH  >750  UA (9/29): large blood, 3-5 RBCs, +protein, trace leuk est, 1+ bacteria, +casts    Renal US (9/27/23)  IMPRESSION:  No obstruction or acute abnormality in the kidneys. Borderline increased  renal echogenicity consistent with medical renal disease.     MRI Brain (9/10/23)  IMPRESSION:  Minimal chronic microvascular angiopathy. No acute intracranial abnormality.     CT Head (9/25)    IMPRESSION:  No acute intracranial abnormality.     CT C Spine w/wo (9/25)  IMPRESSION:  1. No acute abnormality of the cervical spine.  2. Enlargement heterogeneity and multi-nodularity of the right thyroid lobe.     Respiratory panel (9/30):  Adenovirus, coronavirus, COVID, human metapneumovirus, Influenza A/B, Paraflu 1-4, Rhinovirus, RSV, Myocplasma pneumo, chlamydia pneuom, bordatella - ALL NEGATIVE  MRSA nares neg (9/29)  RPR non reactive (9/29)  HIV ½ neg (9/29)     CSF Studies:  E coli, H flu, listeria, Neisseria meningitides, Strep agalactiae, S pneumo, CMV, enterovirus, HVS ½, HHV 6, human parechovirus, VZV, Cryptococcus - ALL NEGATIVE      CSF Culture (9/29/23)       SOURCE:                   Cerebrospinal Fluid      BODY SITE:      No organisms seen.     Tracheal Aspirate (9/29)  No organisms seen.     Blood Culture (9/29) x2  Culture has been received in lab and is no growth to date. Routine cultures are held for 5 days.     Blood Culture: Fungal (9/28)    PRELIMINARY REPORTS  Culture has been received in lab and is no growth to date. Culture will be held for four weeks.     Urine Culture (9/29)  No growth to date     PAST MEDICAL HISTORY  ====================================  -osteoarthritis   -GERD  -HTN  -IBS  -goiter   -T2DM  -primary biliary cirrhosis     PAST SURGICAL HISTORY  ====================================  -operation on thumb 2018  -carpal tunnel release 2016  -bowel obstruction 2016  -appendectomy 2014  -lumpectomy 2012  -hysterectomy 2007  -gastric bypass 2002     HOME  MEDICATIONS  ====================================  Calcium 600+D oral tablet 1 tab(s), Oral, qAM   cetirizine 10 mg oral tablet 10 mg = 1 tab(s), Oral, qAM  Cipro 500 mg, Oral, q12h   Coenzyme Q10 60 mg oral tablet , Oral, BID   Colace 100 mg oral capsule 100 mg = 1 cap(s), PRN, Oral, BID   Dexilant 60 mg oral delayed release capsule   gabapentin 300 mg oral capsule   hyoscyamine 0.125 mg oral tablet 0.125 mg = 1 tab(s), PRN, Oral, BID   Magnesium 250 mg tablet 250 mg = 1 tab(s), Oral, qDay   MetFORMIN (Eqv-Fortamet) 500 mg, Oral, qDay   metroNIDAZOLE 500 mg, q12h   Milk Thistle oral capsule   Niacinamide 500 mg oral tablet 500 mg = 1 tab(s), Oral, Every other day   prenatal Multivitamins oral tablet 1 tab(s), Oral, qAM   Slow Fe 160 mg (50 mg elemental iron) oral tablet, extended release 160 mg = 1 tab(s), Oral, Mon/Wed/Fri   spironolactone 50 mg oral tablet 50 mg = 1 tab(s), Oral, qAM   Trulance 3 mg oral tablet 3 mg = 1 tab(s), Oral, qDay   Vitamin B-12 1000 mcg oral tablet 2,000 mcg = 2 tab(s), Oral, qAM   Vitamin C 1000 mg oral tablet 2,000 mg = 2 tab(s), Oral, BID   Vitamin D3 5000 intl units oral tablet 5,000 International_Unit = 1 tab(s), Oral, qAM   Zinc 140 mg (as elemental zinc 50 mg) oral tablet 140 mg = 1 tab(s), Oral, Tuesday & Saturday       Allergies  Bactrim [sulfamethoxazole-trimethoprim]          Surgical History  She has a past surgical history that includes Carpal tunnel release (N/A, 2016); Abdominal adhesion surgery (09/24/2014); Appendectomy (09/24/2014); Cholecystectomy (11/01/2012); Breast lumpectomy (06/2012); Hysterectomy (04/01/2007); and Bladder suspension (04/01/2007).     Social History  She reports that she has never smoked. She does not have any smokeless tobacco history on file. She reports current alcohol use. She reports that she does not use drugs.    Family History  Family History   Problem Relation Name Age of Onset    Breast cancer Mother      Diabetes Mother       "Hypertension Mother      Hyperlipidemia Mother      Cancer Father      Hypertension Father      Pancreatic cancer Father      Stomach cancer Father      Diabetes Sister      Hypertension Sister      Asthma Son          Allergies  Bactrim [sulfamethoxazole-trimethoprim]       Physical Exam  Patient got extubated today, has dysphonia likely from being intubated  Clear lungs anteriorly   No skin rashes suggestive of DM    No inflammatory arthritis In her joints   She has good  In both hands  She has significant weakness in both upper and lower extremities symmetrically   (Per neuro exam: deltoid and hip flexors weaker bilaterally with normal reflexes)        Last Recorded Vitals  Blood pressure 128/55, pulse 85, temperature 36.5 °C (97.7 °F), resp. rate 19, height 1.651 m (5' 5\"), weight 109 kg (240 lb 4.8 oz), SpO2 100 %.    Relevant Results    Scheduled medications  esomeprazole, 40 mg, nasoduodenal tube, Daily before breakfast  influenza, 0.7 mL, intramuscular, During hospitalization  gabapentin, 300 mg, oral, Daily before breakfast  gabapentin, 900 mg, oral, Nightly  heparin (porcine), 5,000 Units, subcutaneous, q8h  lactulose, 20 g, oral, Daily  pneumococcal conjugate, 0.5 mL, intramuscular, During hospitalization  polyethylene glycol, 17 g, oral, Daily  sennosides-docusate sodium, 1 tablet, oral, Nightly      Continuous medications  dexmedeTOMIDine, 0.2-1.5 mcg/kg/hr, Last Rate: Stopped (10/03/23 1000)      PRN medications  PRN medications: dextrose, dextrose, glucagon         Assessment/Plan     Bisi Mckay is a 63 y.o. female with PMHx of primary biliary cirrhosis, HTN, GERD, DM, breast cancer, and osteoarthritis, who initially presented to [facility] on 9/25 for weakness and fall. She was then transferred to  MICU for acute hypoxic respiratory failure and rheumatologic/neuromuscular consult.  Patient was noted to have bilateral symmetric proximal weakness involving the deltoid and hip flexors, with " dysphagia, and later developed acute hypoxic respiratory failure. No evident ILD changes on xray( no CT chest), no inflammatory arthritis, rashes or Raynaud's.  Her CPK on admission 2K, peaked at 11K and then down trended now at 7K with IV hydration. In the setting of two falls. Likely superimposed rhabdomyolysis.     Impression:  Currently being evaluated by neurology for NMJ etiologies.  Also on the differential is inflammatory myopathy (DM,PM,IBM, necrotizing myopathies),not on statin hence less likely statin induced myopathy, less likely toxin/drug induced, infectious etiology is a possibility given URI sxs around the event however infectious workup has been unrevealing so far.  Less likely metabolic myopathy given this is her first event. Association between PBC and PM?     Patient got extubated today. We will hold off starting immunosuppressive treatment pending CPK trend, EMG results, autoimmune and neurologic workup.      Plan:  -Pending NORMA/GERRY. Please send for extended myositis panel, aldolase, hep C, hep b ag, ab and core total ab, TB-SPOT, vitamin D  -urine toxicology if not done yet   -urine myoglobin and repeat UA  -daily trend for CPK, LFT's  -EMG   -swallowing evaluation and consider NIF test   -RF,CCP( ? RA hx)  Pending above workup, will decide on the need for MRI/Muscle biopsy and further workup to determine the etiology.         Marsha Becerra MD  Rheumatology Fellow,PGY-V    Patient seen with Dr. Lezama

## 2023-10-03 NOTE — SIGNIFICANT EVENT
Patient' son Roger Mckay called this evening to provide an update regarding code status. He found his mom's living will at home, and said it stated she would like to be DNR. He wanted to make sure this was updated in the chart, and will be bring a copy of the living will to the hospital tomorrow to be placed in her chart.

## 2023-10-03 NOTE — PROCEDURES
#12 Fr, small bore feeding tube inserted into L nare with Enteral Access System CORTRAK 2 per provider orders, including insertion of nasal bridle and removal of wire stylet; patient tolerated procedure; feeding tube secured at 80 cm; bedside nurse notified; no bleeding or adverse reaction noted; KUB activated for placement verification.

## 2023-10-03 NOTE — CONSULTS
"Nutrition Assessment Note  Assessment    Assessment:  Reason for Assessment  Reason for Assessment: Dietitian discretion (patient on tube feed)    History:        Pt admitted to an OSH after a fall and being found down at home by family.  Symptoms also included speech irregularities and dysphagia.  Transferred to [facility] and treated for possible rhabdo and UTI.  In ICU for hypoxic respiratory failure-- currently intubated and on precedex for sedation.  Neuro consulted for possible neuromuscular issues.     Pt has an OGT in place for enteral access.  Per RN, pt is NPO for MRI today.     Has a history of HTN, GERD, DM, arthritis, breast CA, IBS, muscle weakness          Biochemical Data, Medical Tests and Procedures        Na+ 143  K+ 4.4  Chloride 110  Bicarb 23  BUN 35  Creatinine 0.93  Calcium 7.7  Phos 3.0  Mag 1.79              Anthropometrics:  Height: 165.1 cm (5' 5\")  Weight: 109 kg (240 lb 4.8 oz)  BMI (Calculated): 39.99    Weight Change: -0.18              IBW/kg (Dietitian Calculated): 56.8 kg  Percent of IBW: 192 %              Energy Needs:  Calculated Energy Needs Using Equations  Height: 165.1 cm (5' 5\")    Estimated Energy Needs  Total Energy Estimated Needs (kCal):  (3177-6162)  Method for Estimating Needs:  (MV= 6.8, RMR= 1622)    Estimated Protein Needs  Total Protein Estimated Needs (g):  (70-80)  Method for Estimating Needs:  (1.2-1.4g x 56.8kg)              Nutrition Focused Physical Findings:  Subcutaneous Fat Loss  Orbital Fat Pads: Well nourshed (slightly bulging fat pads)  Buccal Fat Pads: Well nourished (full, rounded cheeks)  Triceps: Well nourished (ample fat tissue)    Muscle Wasting  Temporalis: Well nourished (well-defined muscle)  Pectoralis (Clavicular Region): Well nourished (clavicle not visible)  Deltoid/Trapezius: Well nourished (rounded appearance at arm, shoulder, neck)  Quadriceps: Well nourished (well developed, well rounded)  Gastrocnemius: Well nourished (well developed " bulbous muscle)    Edema  Edema: +3 moderate  Edema Location:  (generalized)              Diagnosis   Diagnosis:       Patient has Nutrition Diagnosis: Yes  Nutrition Diagnosis 1: Swallowing difficulity  Diagnosis Status (1): New  Related to (1):  (possible neuromuscular disorder)  As Evidenced by (1):  (reports of possible failed swallow eval prior to intubation)  Additional Assessment Information (1):  (For now, will provide patient with tube feed for nutirtion support.  Will need OGT replaced with dobhoff prior to extubation as well as SLP eval. Unclear nutrition status PTA.)                       Interventions/Recommendations   Interventions/Recommendations:  Nutrition Prescription  Individualized Nutrition Prescription Provided for :  (For tube feed, restart Isosource 1.5 post-MRI.  Start at 15mls/hr and increase by 10mls q6hrs until goal rate of 45mls/hr reached.  Goal provides 820mls free water daily.)    Please place dobhoff prior to extubation.      Please consult SLP once pt has been extubated.    Food and/or Nutrient Delivery Interventions     TF at goal rate provides 1620kcals, 73grams protein daily.                  Monitoring and Evaluation   Monitoring/Evaluation:  Food and Nutrient Related History

## 2023-10-03 NOTE — SIGNIFICANT EVENT
Neurology Post-Rounding Recommendations:  Seen this AM on consult rounds. Patient is extubated. Significant dysarthria, but unclear if this is d/t recent extubation. Exam redemonstrates 2/5 shoulder & hip girdle weakness, 4/5 weakness at the knees/elbows, and 5/5 strength of hands/feet.    ==  Assessment:  Bisi Mckay is a 63 y.o. female with a history of T2DM, PBC, and breast cancer who is admitted to the MICU for acute hypoxemic respiratory failure. Neurology is following for evaluation of proximal weakness. Distribution of weakness is most-consistent with myopathy; NMJ disorder is possible but less likely. Will continue to follow for workup.    Impression: Subacute Proximal Symmetric Weakness    Recommendations:  - given patient has a history of PBC, please send antimitochondrial antibody. Please additionally check anti-HMG-CoA Reductase antibody (patient does not take statins, but these antibodies can also form in response to certain foods).  - obtain EMG (discussed case with the neuromuscular fellow who will perform the procedure this week)  - continue to follow remainder of basic NMJ and myopathy workup  - no indication for MRI of the CNS at this time      Thank you for this consult!! Neurology will continue to follow. Please do not hesitate to reach out to our team by page or secure chat with any questions you may have. Patient was seen, examined, and discussed with the attending physician Dr. Keanu Beverly, who agrees w/ the assessment and plan.    Shaq New MD PGY-3  [facility] Inpatient Neurology Team  General Neurology Pager [phone]

## 2023-10-03 NOTE — PROGRESS NOTES
Critical Care Daily Progress        Subjective   Patient is a 63 y.o. female admitted on 10/1/2023  4:38 PM with the following indication(s) for ICU care acute hypoxic respiratory failure requiring intubation 2/2 muscle weakness     Interval History: Patient remained intubated and sedated overnight. She passed an SBT this morning and was extubated. After extubation she was awake and alert, able to cooperate with the neuro team for an exam. Noticeably weak, only able to mouth words at this time.    Complaints: has no complaint of pain.    Scheduled Medications:   esomeprazole, 40 mg, nasoduodenal tube, Daily before breakfast  influenza, 0.7 mL, intramuscular, During hospitalization  gabapentin, 300 mg, oral, Daily before breakfast  gabapentin, 900 mg, oral, Nightly  heparin (porcine), 5,000 Units, subcutaneous, q8h  pneumococcal conjugate, 0.5 mL, intramuscular, During hospitalization  polyethylene glycol, 17 g, oral, Daily  sennosides-docusate sodium, 1 tablet, oral, Nightly      Continuous Medications:   dexmedeTOMIDine, 0.2-1.5 mcg/kg/hr, Last Rate: Stopped (10/03/23 1000)        PRN Medications:   PRN medications: dextrose, dextrose, glucagon    Objective   Vitals:  Most Recent:  Vitals:    10/03/23 1100   BP: 148/53   Pulse: 80   Resp: 23   Temp:    SpO2: 98%       24hr Min/Max:  Temp  Min: 35.8 °C (96.4 °F)  Max: 36.6 °C (97.9 °F)  Pulse  Min: 60  Max: 97  BP  Min: 101/46  Max: 153/57  Resp  Min: 16  Max: 26  SpO2  Min: 97 %  Max: 99 %    Physical Exam    Constitutional:       General: She is not in acute distress.     Appearance: Normal appearance.      Interventions: Extubated  HENT:      Head: Normocephalic and atraumatic.      Mouth/Throat:      Mouth: Mucous membranes are moist.      Pharynx: Oropharynx is clear. No oropharyngeal exudate or posterior oropharyngeal erythema.   Eyes:      General: No scleral icterus.     Extraocular Movements: Extraocular movements intact.      Conjunctiva/sclera:  Conjunctivae normal.      Pupils: Pupils are equal, round, and reactive to light.   Cardiovascular:      Rate and Rhythm: Normal rate and regular rhythm.      Pulses: Normal pulses.      Heart sounds: Normal heart sounds.   Pulmonary:      Effort: Normal.     Breath sounds: Normal breath sounds.  Abdominal:      General: Abdomen is flat. Bowel sounds are normal.      Palpations: Abdomen is soft.      Tenderness: There is no abdominal tenderness. There is no guarding or rebound.   Musculoskeletal:         General: No tenderness or signs of injury.      Cervical back: Normal range of motion.      Right lower leg: No edema.      Left lower leg: No edema.   Skin:     General: Skin is warm and dry.      Coloration: Skin is not jaundiced or pale.      Findings: No bruising, erythema, lesion or rash.   Neurological:      Mental Status: She is alert.      Motor: Weakness present. Neck flexion 2-3/5. Proximal Upper and lower extremities 2-3/5. Distal upper and lower extremities 4/5 strength.  Psychiatric:     Mood and Affect: Mood normal.         Behavior: Behavior normal.       LDA:  Urethral Catheter (Active)   Placement Date/Time: 09/23/23 0000     Number of days: 9       NG/OG/Feeding Tube Left nostril (Active)   Placement Date/Time: 09/24/23 0155   Earliest Known Present: 09/24/23  Tube Location: (c) Left nostril   Number of days: 8       LABS AND IMAGING   Results for orders placed or performed during the hospital encounter of 10/01/23 (from the past 24 hour(s))   POCT GLUCOSE   Result Value Ref Range    POCT Glucose 180 (H) 74 - 99 mg/dL   CBC   Result Value Ref Range    WBC 10.5 4.4 - 11.3 x10*3/uL    nRBC 0.0 0.0 - 0.0 /100 WBCs    RBC 3.30 (L) 4.00 - 5.20 x10*6/uL    Hemoglobin 9.8 (L) 12.0 - 16.0 g/dL    Hematocrit 29.1 (L) 36.0 - 46.0 %    MCV 88 80 - 100 fL    MCH 29.7 26.0 - 34.0 pg    MCHC 33.7 32.0 - 36.0 g/dL    RDW 14.1 11.5 - 14.5 %    Platelets 286 150 - 450 x10*3/uL    MPV 9.5 7.5 - 11.5 fL   Magnesium    Result Value Ref Range    Magnesium 1.79 1.60 - 2.40 mg/dL   Renal Function Panel   Result Value Ref Range    Glucose 185 (H) 74 - 99 mg/dL    Sodium 143 136 - 145 mmol/L    Potassium 4.4 3.5 - 5.3 mmol/L    Chloride 110 (H) 98 - 107 mmol/L    Bicarbonate 23 21 - 32 mmol/L    Anion Gap 14 10 - 20 mmol/L    Urea Nitrogen 35 (H) 6 - 23 mg/dL    Creatinine 0.93 0.50 - 1.05 mg/dL    eGFR 68 >60 mL/min/1.73m*2    Calcium 7.7 (L) 8.6 - 10.6 mg/dL    Phosphorus 3.0 2.5 - 4.9 mg/dL    Albumin 2.0 (L) 3.4 - 5.0 g/dL      Assessment/Plan  Principal Problem:    Acute hypoxic respiratory failure (CMS/HCC)  62 yo F w/ PMHx primary biliary cirrhosis, HTN, GERD, DM, hx of breast cancer, and osteoarthritis presented to [place] on 9/25/23 for weakness and falls transferred to MICU 10/1/23 for further workup of acute hypoxic respiratory failure and concern for neurological or rheumatological cause given failed liberation attempts at the OSH.       Neuro  - Patient extubated, alert, weakened  - Neuro onboard     Cardiovascular  #Hx of Hypertension  - currently normotensive  - holding home BP meds     Pulmonary  #Acute hypoxic respiratory failure  - patient was intubated with minimal vent settings  - 450, 16, peep 5, 40%  - had been hypoxic/tachypneic at [place]  - Extubated, satting okay on RA     GI  #Constipation  - bowel regimen  - no BM by today, adding lactulose     Renal/  #Neurogenic bladder?  - has bladder stimulating device  - specific requirements for MRI, son has card with device info  - Serial Number: [id number]  - Model Number 3058  - Mail'Inside/mri, [phone]  - rae catheter  - son brought the device remote and is bringing the manual     #Hypernatremia, resolved  - Na 150 -> 143  - D5 water 100ml/hr, discontinued     #Hypophosphatemia, resolved     #SKIP, improved  - Cr 0.93     ID  - no known issues  - had received Doxycycline for lyme?  - WBC wnl     MSK/Skel  #Generalized weakness  R/o Guillaine [place],  Lyme, MG, Polymyositis, MS  - Anti-Ach, Lyme 2 step, NORMA, aldolase, ANCA  - Anti-SSA/SSB negative, Anti-Lamar negative  - neuro and rheum onboard  - neuro: EMG, concern for NMJ-opathy or myopathy, no need for MRI at this time  - rheum: HMG-co reductase Ab, extended myositis, trend ESR/CRP, trend CK daily     #Rhabdomyolysis  - CK trending down  - 11,773 (9/30) -> 7,961 (10/1)  - elevated transaminases  - trending CK daily    #Dysphagia  - place dobhoff for tube feeds  - SLP for swallow eval  - dietician consult    Endo  #Hypoglycemia, resolved  #T2DM  - hold home metformin  - hypoglycemia protocol  - tube feeds     Fluids: holding  Electrolytes: replete prn  Nutrition: tube feed  GI Ppx: esomeprazole  Abx: none  Access: PIVs     Dispo: Pt is a 62yo F being treated in the MICU for acute hypoxic respiratory failure 2/2 generalized weakness of unknown etiology. Patient extubated from mechanical ventilation. Neuro and rheum consulted.     Regulo Cobos,   Internal Medicine PGY1

## 2023-10-03 NOTE — CARE PLAN
The patient's goals for the shift include  HARRISON    The clinical goals for the shift include SPO2 >/= 90%  Over the shift, the patient did not make progress toward the following goals. Barriers to progression include n/a. Recommendations to address these barriers include n/a.    Problem: Pain - Adult  Goal: Verbalizes/displays adequate comfort level or baseline comfort level  Outcome: Not Progressing

## 2023-10-04 PROBLEM — E11.9 DIABETES MELLITUS, TYPE 2 (MULTI): Chronic | Status: ACTIVE | Noted: 2023-10-04

## 2023-10-04 PROBLEM — M62.81 MUSCLE WEAKNESS: Status: ACTIVE | Noted: 2023-10-04

## 2023-10-04 LAB
ALBUMIN SERPL BCP-MCNC: 2 G/DL (ref 3.4–5)
ALP SERPL-CCNC: 85 U/L (ref 33–136)
ALT SERPL W P-5'-P-CCNC: 112 U/L (ref 7–45)
AMPHETAMINES UR QL SCN: ABNORMAL
ANA SER QL HEP2 SUBST: NEGATIVE
ANION GAP BLDV CALCULATED.4IONS-SCNC: 5 MMOL/L (ref 10–25)
ANION GAP SERPL CALC-SCNC: 16 MMOL/L (ref 10–20)
AST SERPL W P-5'-P-CCNC: 116 U/L (ref 9–39)
BARBITURATES UR QL SCN: ABNORMAL
BASE EXCESS BLDV CALC-SCNC: 1.9 MMOL/L (ref -2–3)
BENZODIAZ UR QL SCN: ABNORMAL
BODY TEMPERATURE: 37 DEGREES CELSIUS
BUN SERPL-MCNC: 28 MG/DL (ref 6–23)
BZE UR QL SCN: ABNORMAL
CA-I BLDV-SCNC: 1.16 MMOL/L (ref 1.1–1.33)
CALCIUM SERPL-MCNC: 7.7 MG/DL (ref 8.6–10.6)
CANNABINOIDS UR QL SCN: ABNORMAL
CCP IGG SERPL-ACNC: <1 U/ML
CHLORIDE BLDV-SCNC: 114 MMOL/L (ref 98–107)
CHLORIDE SERPL-SCNC: 113 MMOL/L (ref 98–107)
CK SERPL-CCNC: 1709 U/L (ref 0–215)
CO2 SERPL-SCNC: 23 MMOL/L (ref 21–32)
CREAT SERPL-MCNC: 0.9 MG/DL (ref 0.5–1.05)
CRP SERPL-MCNC: 13.24 MG/DL
ERYTHROCYTE [DISTWIDTH] IN BLOOD BY AUTOMATED COUNT: 14.1 % (ref 11.5–14.5)
ERYTHROCYTE [SEDIMENTATION RATE] IN BLOOD BY WESTERGREN METHOD: 24 MM/H (ref 0–30)
FENTANYL+NORFENTANYL UR QL SCN: ABNORMAL
GFR SERPL CREATININE-BSD FRML MDRD: 71 ML/MIN/1.73M*2
GLUCOSE BLD MANUAL STRIP-MCNC: 150 MG/DL (ref 74–99)
GLUCOSE BLD MANUAL STRIP-MCNC: 154 MG/DL (ref 74–99)
GLUCOSE BLD MANUAL STRIP-MCNC: 184 MG/DL (ref 74–99)
GLUCOSE BLDV-MCNC: 186 MG/DL (ref 74–99)
GLUCOSE SERPL-MCNC: 173 MG/DL (ref 74–99)
HCO3 BLDV-SCNC: 25.3 MMOL/L (ref 22–26)
HCT VFR BLD AUTO: 27.1 % (ref 36–46)
HCT VFR BLD EST: 27 % (ref 36–46)
HGB BLD-MCNC: 9.4 G/DL (ref 12–16)
HGB BLDV-MCNC: 9 G/DL (ref 12–16)
LACTATE BLDV-SCNC: 1.1 MMOL/L (ref 0.4–2)
MAGNESIUM SERPL-MCNC: 1.74 MG/DL (ref 1.6–2.4)
MCH RBC QN AUTO: 31.2 PG (ref 26–34)
MCHC RBC AUTO-ENTMCNC: 34.7 G/DL (ref 32–36)
MCV RBC AUTO: 90 FL (ref 80–100)
NRBC BLD-RTO: 0 /100 WBCS (ref 0–0)
OPIATES UR QL SCN: ABNORMAL
OXYCODONE+OXYMORPHONE UR QL SCN: ABNORMAL
OXYHGB MFR BLDV: 88.3 % (ref 45–75)
PCO2 BLDV: 34 MM HG (ref 41–51)
PCP UR QL SCN: ABNORMAL
PH BLDV: 7.48 PH (ref 7.33–7.43)
PHOSPHATE SERPL-MCNC: 3.8 MG/DL (ref 2.5–4.9)
PLATELET # BLD AUTO: 305 X10*3/UL (ref 150–450)
PMV BLD AUTO: 9.4 FL (ref 7.5–11.5)
PO2 BLDV: 67 MM HG (ref 35–45)
POTASSIUM BLDV-SCNC: 3.5 MMOL/L (ref 3.5–5.3)
POTASSIUM SERPL-SCNC: 3.8 MMOL/L (ref 3.5–5.3)
RBC # BLD AUTO: 3.01 X10*6/UL (ref 4–5.2)
RHEUMATOID FACT SER NEPH-ACNC: 11 IU/ML (ref 0–15)
SAO2 % BLDV: 90 % (ref 45–75)
SODIUM BLDV-SCNC: 141 MMOL/L (ref 136–145)
SODIUM SERPL-SCNC: 148 MMOL/L (ref 136–145)
WBC # BLD AUTO: 9.1 X10*3/UL (ref 4.4–11.3)

## 2023-10-04 PROCEDURE — 84460 ALANINE AMINO (ALT) (SGPT): CPT

## 2023-10-04 PROCEDURE — 86140 C-REACTIVE PROTEIN: CPT

## 2023-10-04 PROCEDURE — 85652 RBC SED RATE AUTOMATED: CPT

## 2023-10-04 PROCEDURE — 96372 THER/PROPH/DIAG INJ SC/IM: CPT

## 2023-10-04 PROCEDURE — 86381 MITOCHONDRIAL ANTIBODY EACH: CPT

## 2023-10-04 PROCEDURE — 2580000001 HC RX 258 IV SOLUTIONS: Performed by: STUDENT IN AN ORGANIZED HEALTH CARE EDUCATION/TRAINING PROGRAM

## 2023-10-04 PROCEDURE — 2500000001 HC RX 250 WO HCPCS SELF ADMINISTERED DRUGS (ALT 637 FOR MEDICARE OP): Performed by: STUDENT IN AN ORGANIZED HEALTH CARE EDUCATION/TRAINING PROGRAM

## 2023-10-04 PROCEDURE — 97530 THERAPEUTIC ACTIVITIES: CPT | Mod: GP

## 2023-10-04 PROCEDURE — 82947 ASSAY GLUCOSE BLOOD QUANT: CPT

## 2023-10-04 PROCEDURE — 1100000001 HC PRIVATE ROOM DAILY

## 2023-10-04 PROCEDURE — 86200 CCP ANTIBODY: CPT

## 2023-10-04 PROCEDURE — 82550 ASSAY OF CK (CPK): CPT

## 2023-10-04 PROCEDURE — 86481 TB AG RESPONSE T-CELL SUSP: CPT | Performed by: STUDENT IN AN ORGANIZED HEALTH CARE EDUCATION/TRAINING PROGRAM

## 2023-10-04 PROCEDURE — 99232 SBSQ HOSP IP/OBS MODERATE 35: CPT

## 2023-10-04 PROCEDURE — 36415 COLL VENOUS BLD VENIPUNCTURE: CPT | Performed by: STUDENT IN AN ORGANIZED HEALTH CARE EDUCATION/TRAINING PROGRAM

## 2023-10-04 PROCEDURE — 80307 DRUG TEST PRSMV CHEM ANLYZR: CPT

## 2023-10-04 PROCEDURE — 80354 DRUG SCREENING FENTANYL: CPT | Mod: CMCLAB

## 2023-10-04 PROCEDURE — 84450 TRANSFERASE (AST) (SGOT): CPT

## 2023-10-04 PROCEDURE — 86431 RHEUMATOID FACTOR QUANT: CPT

## 2023-10-04 PROCEDURE — 36415 COLL VENOUS BLD VENIPUNCTURE: CPT

## 2023-10-04 PROCEDURE — 83874 ASSAY OF MYOGLOBIN: CPT

## 2023-10-04 PROCEDURE — 83605 ASSAY OF LACTIC ACID: CPT

## 2023-10-04 PROCEDURE — 84484 ASSAY OF TROPONIN QUANT: CPT

## 2023-10-04 PROCEDURE — 85027 COMPLETE CBC AUTOMATED: CPT

## 2023-10-04 PROCEDURE — 2580000001 HC RX 258 IV SOLUTIONS

## 2023-10-04 PROCEDURE — 2500000001 HC RX 250 WO HCPCS SELF ADMINISTERED DRUGS (ALT 637 FOR MEDICARE OP)

## 2023-10-04 PROCEDURE — 2500000004 HC RX 250 GENERAL PHARMACY W/ HCPCS (ALT 636 FOR OP/ED)

## 2023-10-04 PROCEDURE — 80069 RENAL FUNCTION PANEL: CPT

## 2023-10-04 PROCEDURE — 97162 PT EVAL MOD COMPLEX 30 MIN: CPT | Mod: GP

## 2023-10-04 PROCEDURE — 99233 SBSQ HOSP IP/OBS HIGH 50: CPT | Performed by: INTERNAL MEDICINE

## 2023-10-04 PROCEDURE — 2500000002 HC RX 250 W HCPCS SELF ADMINISTERED DRUGS (ALT 637 FOR MEDICARE OP, ALT 636 FOR OP/ED)

## 2023-10-04 PROCEDURE — 83735 ASSAY OF MAGNESIUM: CPT

## 2023-10-04 PROCEDURE — 84075 ASSAY ALKALINE PHOSPHATASE: CPT

## 2023-10-04 RX ORDER — INSULIN LISPRO 100 [IU]/ML
0-5 INJECTION, SOLUTION INTRAVENOUS; SUBCUTANEOUS EVERY 4 HOURS
Status: DISCONTINUED | OUTPATIENT
Start: 2023-10-04 | End: 2023-10-05

## 2023-10-04 RX ORDER — SODIUM CHLORIDE, SODIUM LACTATE, POTASSIUM CHLORIDE, CALCIUM CHLORIDE 600; 310; 30; 20 MG/100ML; MG/100ML; MG/100ML; MG/100ML
125 INJECTION, SOLUTION INTRAVENOUS CONTINUOUS
Status: DISCONTINUED | OUTPATIENT
Start: 2023-10-04 | End: 2023-10-04

## 2023-10-04 RX ADMIN — SODIUM CHLORIDE, POTASSIUM CHLORIDE, SODIUM LACTATE AND CALCIUM CHLORIDE 500 ML: 600; 310; 30; 20 INJECTION, SOLUTION INTRAVENOUS at 11:54

## 2023-10-04 RX ADMIN — INSULIN LISPRO 1 UNITS: 100 INJECTION, SOLUTION INTRAVENOUS; SUBCUTANEOUS at 12:26

## 2023-10-04 RX ADMIN — GABAPENTIN 300 MG: 250 SOLUTION ORAL at 06:46

## 2023-10-04 RX ADMIN — HEPARIN SODIUM 5000 UNITS: 5000 INJECTION INTRAVENOUS; SUBCUTANEOUS at 16:29

## 2023-10-04 RX ADMIN — SODIUM CHLORIDE, POTASSIUM CHLORIDE, SODIUM LACTATE AND CALCIUM CHLORIDE 500 ML: 600; 310; 30; 20 INJECTION, SOLUTION INTRAVENOUS at 05:44

## 2023-10-04 RX ADMIN — HEPARIN SODIUM 5000 UNITS: 5000 INJECTION INTRAVENOUS; SUBCUTANEOUS at 08:36

## 2023-10-04 RX ADMIN — GABAPENTIN 900 MG: 250 SOLUTION ORAL at 00:27

## 2023-10-04 RX ADMIN — HEPARIN SODIUM 5000 UNITS: 5000 INJECTION INTRAVENOUS; SUBCUTANEOUS at 00:26

## 2023-10-04 RX ADMIN — ESOMEPRAZOLE MAGNESIUM 40 MG: 40 FOR SUSPENSION ORAL at 06:46

## 2023-10-04 ASSESSMENT — PAIN SCALES - GENERAL
PAINLEVEL_OUTOF10: 0 - NO PAIN

## 2023-10-04 ASSESSMENT — PAIN - FUNCTIONAL ASSESSMENT
PAIN_FUNCTIONAL_ASSESSMENT: 0-10

## 2023-10-04 ASSESSMENT — COGNITIVE AND FUNCTIONAL STATUS - GENERAL
WALKING IN HOSPITAL ROOM: TOTAL
CLIMB 3 TO 5 STEPS WITH RAILING: TOTAL
MOVING FROM LYING ON BACK TO SITTING ON SIDE OF FLAT BED WITH BEDRAILS: TOTAL
MOVING TO AND FROM BED TO CHAIR: TOTAL
STANDING UP FROM CHAIR USING ARMS: TOTAL
MOBILITY SCORE: 6
TURNING FROM BACK TO SIDE WHILE IN FLAT BAD: TOTAL

## 2023-10-04 NOTE — PROGRESS NOTES
Critical Care Daily Progress  (Transfer Note)        Subjective   Patient is a 63 y.o. female admitted on 10/1/2023  4:38 PM with the following indication(s) for ICU care acute hypoxic respiratory failure requiring intubation 2/2 muscle weakness     Hospital Course: Bisi Mckay is a 64 yo F w/ PMHx primary biliary cirrhosis, HTN, GERD, DM, breast cancer, and osteoarthritis presenting to [place] on 9/25 [facility] and rheumatologic/neuromuscular consult.     Patient was reportedly having ascending weakness over the past several weeks. She endorsed difficulty climbing stairs, getting up from sitting position, combing her hair and holding a spoon. Per documentation had “speech irregularities” or dysarthria. She denied numbness or tingling. Two weeks ago she endorsed URI symptoms but her home covid test was negative. Her PCP ordered an MRI 9/10 to further evaluate her progressive weakness which was unremarkable. She was found down at home (reportedly for a few hours) by her son on 9/25. On initial arrival was tachycardic to 120s, had leukocytosis to 12.6, creat of 1.75 (baseline 0.7), CK of 2880 and trop of 296. CT head, CT C spine and CXR were unremarkable. She was admitted to the [facility] floor with concern for rhabdo. She was managed with maintenance fluids for the rhabdo however CK continued to rise with peak of 11,773. She was also treated for a UTI with ceftriaxone (9/26 - ). Nephrology was consulted for SKIP, thought to be 2/2 to rhabdo and started on bicarb drip for metabolic acidosis. On 9/27 SKIP continued to worsen w/ Cr at 2.5; had a renal ultrasound which was unremarkable. On 9/28 started to desat; was requiring 2L NC on the floor. Also endorsing swallowing difficulties and dysphagia. Was evaluated by speech who recommended strict NPO.      She had several choking episodes on the floor, and eventually a rapid response was called for  tachypnea, tachycardia, fever and lethargy. Patient was intubated and transferred to the ICU. Differential is broad and includes autoimmune process, neuromuscular disease or central process. Patient was ultimately transferred for eval by subspecialist teams.     [facility] MICU:  Patient arrived alert but on mechanical ventilation. Patient's son and sister-in-law were bedside. Son notes that patient had been on a bus trip to [place], [place] within the past month. States the trip appeared to fatigue and weaken his mother. Noted that patient became progressively weak and complained of some lower back pain. Patient eventually fell once on the 23rd and then a second time on the 25th before being admitted to [facility]. Son notes that patient has a bladder stimulation device that has an MRI mode. Son has brought the remote for the device.    Patient extubated on 10/3. She can speak softly, but remains weakened and is requiring a dobhoff for enteral feeding. She states that she noticed some of her symptoms began in May. Neuro and rheumatology consulted. Extensive lab workup has been ordered and drawn. EMG has been ordered. No plan for MRI at this time. Patient is stable for transfer to floor.    Complaints: has no complaint of pain.    Scheduled Medications:   esomeprazole, 40 mg, nasoduodenal tube, Daily before breakfast  influenza, 0.7 mL, intramuscular, During hospitalization  gabapentin, 300 mg, oral, Daily before breakfast  gabapentin, 900 mg, oral, Nightly  heparin (porcine), 5,000 Units, subcutaneous, q8h  insulin lispro, 0-5 Units, subcutaneous, q4h  pneumococcal conjugate, 0.5 mL, intramuscular, During hospitalization  sennosides-docusate sodium, 1 tablet, oral, Nightly      Continuous Medications:          PRN Medications:   PRN medications: dextrose 10 % in water (D10W), dextrose, glucagon    Objective   Vitals:  Most Recent:  Vitals:    10/04/23 1200   BP: 127/52   Pulse: (!) 115   Resp: 18   Temp:     SpO2: 95%       24hr Min/Max:  Temp  Min: 35.4 °C (95.7 °F)  Max: 36.6 °C (97.9 °F)  Pulse  Min: 82  Max: 128  BP  Min: 102/43  Max: 140/64  Resp  Min: 16  Max: 22  SpO2  Min: 94 %  Max: 100 %    Physical Exam    Constitutional:       General: She is not in acute distress.     Appearance: Normal appearance.      Interventions: Extubated  HENT:      Head: Normocephalic and atraumatic.      Mouth/Throat:      Mouth: Mucous membranes are moist.      Pharynx: Oropharynx is clear. No oropharyngeal exudate or posterior oropharyngeal erythema.   Eyes:      General: No scleral icterus.     Extraocular Movements: Extraocular movements intact.      Conjunctiva/sclera: Conjunctivae normal.      Pupils: Pupils are equal, round, and reactive to light.   Cardiovascular:      Rate and Rhythm: Normal rate and regular rhythm.      Pulses: Normal pulses.      Heart sounds: Normal heart sounds.   Pulmonary:      Effort: Normal.     Breath sounds: Normal breath sounds.  Abdominal:      General: Abdomen is flat. Bowel sounds are normal.      Palpations: Abdomen is soft.      Tenderness: There is no abdominal tenderness. There is no guarding or rebound.   Musculoskeletal:         General: No tenderness or signs of injury.      Cervical back: Normal range of motion.      Right lower leg: No edema.      Left lower leg: No edema.   Skin:     General: Skin is warm and dry.      Coloration: Skin is not jaundiced or pale.      Findings: No bruising, erythema, lesion or rash.   Neurological:      Mental Status: She is alert.      Motor: Weakness present. Neck flexion 2-3/5. Proximal Upper and lower extremities 2-3/5. Distal upper and lower extremities 4/5 strength.  Psychiatric:     Mood and Affect: Mood normal.         Behavior: Behavior normal.       LDA:  Urethral Catheter (Active)   Placement Date/Time: 09/23/23 0000     Number of days: 9       NG/OG/Feeding Tube Left nostril (Active)   Placement Date/Time: 09/24/23 0155   Earliest Known  Present: 09/24/23  Tube Location: (c) Left nostril   Number of days: 8       LABS AND IMAGING   Results for orders placed or performed during the hospital encounter of 10/01/23 (from the past 24 hour(s))   Creatine Kinase   Result Value Ref Range    Creatine Kinase 1,709 (H) 0 - 215 U/L   Sedimentation rate, automated   Result Value Ref Range    Sedimentation Rate 24 0 - 30 mm/h   C-reactive protein   Result Value Ref Range    C-Reactive Protein 13.24 (H) <1.00 mg/dL   CBC   Result Value Ref Range    WBC 9.1 4.4 - 11.3 x10*3/uL    nRBC 0.0 0.0 - 0.0 /100 WBCs    RBC 3.01 (L) 4.00 - 5.20 x10*6/uL    Hemoglobin 9.4 (L) 12.0 - 16.0 g/dL    Hematocrit 27.1 (L) 36.0 - 46.0 %    MCV 90 80 - 100 fL    MCH 31.2 26.0 - 34.0 pg    MCHC 34.7 32.0 - 36.0 g/dL    RDW 14.1 11.5 - 14.5 %    Platelets 305 150 - 450 x10*3/uL    MPV 9.4 7.5 - 11.5 fL   Magnesium   Result Value Ref Range    Magnesium 1.74 1.60 - 2.40 mg/dL   Renal Function Panel   Result Value Ref Range    Glucose 173 (H) 74 - 99 mg/dL    Sodium 148 (H) 136 - 145 mmol/L    Potassium 3.8 3.5 - 5.3 mmol/L    Chloride 113 (H) 98 - 107 mmol/L    Bicarbonate 23 21 - 32 mmol/L    Anion Gap 16 10 - 20 mmol/L    Urea Nitrogen 28 (H) 6 - 23 mg/dL    Creatinine 0.90 0.50 - 1.05 mg/dL    eGFR 71 >60 mL/min/1.73m*2    Calcium 7.7 (L) 8.6 - 10.6 mg/dL    Phosphorus 3.8 2.5 - 4.9 mg/dL    Albumin 2.0 (L) 3.4 - 5.0 g/dL   Blood Gas Venous Full Panel   Result Value Ref Range    POCT pH, Venous 7.48 (H) 7.33 - 7.43 pH    POCT pCO2, Venous 34 (L) 41 - 51 mm Hg    POCT pO2, Venous 67 (H) 35 - 45 mm Hg    POCT SO2, Venous 90 (H) 45 - 75 %    POCT Oxy Hemoglobin, Venous 88.3 (H) 45.0 - 75.0 %    POCT Hematocrit Calculated, Venous 27.0 (L) 36.0 - 46.0 %    POCT Sodium, Venous 141 136 - 145 mmol/L    POCT Potassium, Venous 3.5 3.5 - 5.3 mmol/L    POCT Chloride, Venous 114 (H) 98 - 107 mmol/L    POCT Ionized [place], Venous 1.16 1.10 - 1.33 mmol/L    POCT Glucose, Venous 186 (H) 74 - 99  mg/dL    POCT Lactate, Venous 1.1 0.4 - 2.0 mmol/L    POCT Base Excess, Venous 1.9 -2.0 - 3.0 mmol/L    POCT HCO3 Calculated, Venous 25.3 22.0 - 26.0 mmol/L    POCT Hemoglobin, Venous 9.0 (L) 12.0 - 16.0 g/dL    POCT Anion Gap, Venous 5.0 (L) 10.0 - 25.0 mmol/L    Patient Temperature 37.0 degrees Celsius   AST   Result Value Ref Range     (H) 9 - 39 U/L   ALT   Result Value Ref Range     (H) 7 - 45 U/L   Alkaline phosphatase   Result Value Ref Range    Alkaline Phosphatase 85 33 - 136 U/L   Rheumatoid Factor   Result Value Ref Range    Rheumatoid Factor 11 0 - 15 IU/mL   Drug Screen, Urine With Reflex to Confirmation   Result Value Ref Range    Amphetamine Screen, Urine Presumptive Negative Presumptive Negative    Barbiturate Screen, Urine Presumptive Negative Presumptive Negative    Benzodiazepines Screen, Urine Presumptive Negative Presumptive Negative    Cannabinoid Screen, Urine Presumptive Negative Presumptive Negative    Cocaine Metabolite Screen, Urine Presumptive Negative Presumptive Negative    Fentanyl Screen, Urine Presumptive Positive (A) Presumptive Negative    Opiate Screen, Urine Presumptive Negative Presumptive Negative    Oxycodone Screen, Urine Presumptive Negative Presumptive Negative    PCP Screen, Urine Presumptive Negative Presumptive Negative   POCT GLUCOSE   Result Value Ref Range    POCT Glucose 184 (H) 74 - 99 mg/dL      Assessment/Plan  Principal Problem:    Acute hypoxic respiratory failure (CMS/HCC)  62 yo F w/ PMHx primary biliary cirrhosis, HTN, GERD, DM, hx of breast cancer, and osteoarthritis presented to [place] on 9/25/23 for weakness [facility] for further workup of acute hypoxic respiratory failure and concern for neurological or rheumatological cause given failed liberation attempts at the OSH.       Neuro  - Patient extubated, alert, weakened  - Neuro onboard     Cardiovascular  #Hx of Hypertension  - currently normotensive  - holding  home BP meds     #Tachycardia  - tachy overnight and this morning  - EKG: nsr  - received 500 ml bolus overnight  - another 1L LR today, monitor    Pulmonary  #Acute hypoxic respiratory failure  - patient was intubated with minimal vent settings: 450, 16, peep 5, 40%  - had been hypoxic/tachypneic at [place]  - Extubated, satting okay on RA     GI  #Constipation  - bowel regimen  - had 4x loose BM after adding lactulose, stopping lactulose     Renal/  #Neurogenic bladder?  - has bladder stimulating device  - specific requirements for MRI, son has card with device info  - Serial Number: [id number]  - Model Number 3058  - [employer] Plus/mri, [phone]  - rae catheter  - son brought the device remote     #Hypernatremia  - Na 150 -> 148  - free water flushes     #Hypophosphatemia, resolved     #SKIP, improved  - Cr 0.90     ID  - no known issues  - had received Doxycycline for lyme?  - WBC wnl     MSK/Skel  #Generalized weakness  R/o Guillaine [place], Lyme, MG, Polymyositis, MS  - Anti-Ach, Lyme 2 step, NORMA, aldolase, ANCA  - Anti-SSA/SSB negative, Anti-Lamar negative  - neuro and rheum onboard  - neuro: EMG, concern for NMJ-opathy or myopathy, no need for MRI at this time  - rheum: HMG-co reductase Ab, extended myositis, UA, urine tox and myglobin, RF, CCP, anti-mitochondrial, hepatitis panel, trend LFTs, trend ESR/CRP, trend CK daily     #Rhabdomyolysis  - CK trending down  - 11,773 (9/30) -> 1,709 (10/4)  - elevated transaminases  - trending CK daily    #Dysphagia  - place dobhoff for tube feeds  - SLP: NPO w/ ice chips (5/hr), aggressive oral care  - dietician consult    Endo  #Hypoglycemia, resolved  #T2DM  - hold home metformin  - hypoglycemia protocol  - tube feeds  - sliding scale 1     Fluids: 1L LR bolus, free water flushes  Electrolytes: replete prn  Nutrition: tube feed  GI Ppx: esomeprazole  Abx: none  Access: 3 PIVs     Dispo: Pt is a 62yo F being treated in the MICU for acute hypoxic respiratory  failure 2/2 generalized weakness of unknown etiology. Patient extubated from mechanical ventilation. Neuro and rheum consulted. Patient stable for transfer to floor.     Regulo Cobos, DO  Internal Medicine PGY1

## 2023-10-04 NOTE — NURSING NOTE
Pt arrived to [facility] MD notified.  Patient oriented to room, call light function, and POC.

## 2023-10-04 NOTE — PROGRESS NOTES
"Bisi Mckay is a 63 y.o. female on day 3 of admission presenting with Muscle weakness.    Subjective   Patient extubated, on room air. Son reports recent flu vaccine and also natural gas exposure prior to her symptoms.   Patient today reports hx of stiffness in the morning prior to her more recent symptoms and also feeling achy in shoulders and ankles mostly.   Denies trouble swallowing.     Objective     Physical Exam  Significant proximal weakness In both arms and legs.   Pain with left shoulder abduction, no pain with forward flexion. Right shoulder ROM not significantly painful, reports mild pain attributed to previous breast surgery/lymphadenectomy?   Good  in her hands.  No hip or thigh pain. Mostly weakness.   Skin lesions over both elbows likely traumatic from the falls rather than inflammatory         Last Recorded Vitals  Blood pressure 127/52, pulse (!) 114, temperature 35.4 °C (95.7 °F), resp. rate 10, height 1.651 m (5' 5\"), weight 109 kg (240 lb 8.4 oz), SpO2 96 %.  Intake/Output last 3 Shifts:  I/O last 3 completed shifts:  In: 1080.2 (9.9 mL/kg) [P.O.:80; I.V.:245.2 (2.2 mL/kg); NG/GT:255; IV Piggyback:500]  Out: 2100 (19.2 mL/kg) [Urine:2100 (0.5 mL/kg/hr)]  Weight: 109.1 kg     Relevant Results  Scheduled medications  esomeprazole, 40 mg, nasoduodenal tube, Daily before breakfast  influenza, 0.7 mL, intramuscular, During hospitalization  gabapentin, 300 mg, oral, Daily before breakfast  gabapentin, 900 mg, oral, Nightly  heparin (porcine), 5,000 Units, subcutaneous, q8h  insulin lispro, 0-5 Units, subcutaneous, q4h  pneumococcal conjugate, 0.5 mL, intramuscular, During hospitalization  sennosides-docusate sodium, 1 tablet, oral, Nightly      Continuous medications     PRN medications  PRN medications: dextrose 10 % in water (D10W), dextrose, glucagon                  Assessment/Plan   Bisi Mckay is a 63 y.o. female with PMHx of primary biliary cirrhosis, HTN, GERD, DM, breast cancer, and " osteoarthritis, who initially presented to [facility] on 9/25 for weakness and fall. She was then transferred to  MICU for acute hypoxic respiratory failure and rheumatologic/neuromuscular consult.  Patient was noted to have bilateral symmetric proximal weakness involving the deltoid and hip flexors, with dysphagia, and later developed acute hypoxic respiratory failure. No evident ILD changes on xray( no CT chest), no inflammatory arthritis, rashes or Raynaud's.  Her CPK on admission 2K, peaked at 11K and then down trended now to 1.7K with IV hydration. In the setting of two falls. Likely superimposed rhabdomyolysis.     Urine tox unrevealing  CPK down to 1.7K  CCP, RF negative     Impression:  Currently being evaluated by neurology for NMJ etiologies.  Also on the differential is inflammatory myopathy (DM,PM,IBM, necrotizing myopathies),not on statin hence less likely statin induced myopathy, less likely toxin/drug induced, infectious etiology is a possibility given URI sxs around the event however infectious workup has been unrevealing so far.  Less likely metabolic myopathy given this is her first event. Association between PBC and PM?     Noted persistent CRP elevation up to 13 mg/dl. Typically inflammatory myositis doesn't cause significant elevation in the CRP. At this point, it's unclear what's causing this elevation. Will monitor closely.   We will hold off starting immunosuppressive treatment pending CPK trend, EMG results, autoimmune and neurologic workup.     Plan:  -Pending NORMA/GERRY, extended myositis panel, hep C, hep b ag, ab and core total ab, TB-SPOT, vitamin D  -urine myoglobin and repeat UA  -daily trend for CPK, LFT's  -EMG   -swallowing evaluation and consider NIF test   Pending above workup, will decide on the need for MRI/Muscle biopsy and further workup to determine the etiology.            Marsha Becerra MD  Rheumatology Fellow,PGY-V    Patient seen with Dr. Lezama    Rheumatology  Attending    I interviewed and examined the patient and discussed the treatment plan. See above note for details.  I agree with the findings and plan of care summarized in the above note.     The kinetics of the serum CPK rise and fall are consistent with rhabdomyolysis. She has significant proximal muscle weakness on bedside exam, and even if the serum CPK normalizes without immunosuppression it will be necessary to determine the cause and initiate appropriate treatment. An autoimmune myopathy can be present even with a normal serum CPK. Will continue supportive care without immunosuppression while awaiting pending tests.      Shar Lezama MD

## 2023-10-04 NOTE — NURSING NOTE
Feeding Tube Placement  12F feeding tube placed using Cortrak 2 technology per MD orders. Pt educated about procedure. Feeding tube placed into L nare and bridled at 60cm. Pt tolerated well with minimal distress and no s/s bleeding. Xray to be performed. Stylet removed. RN educated about use. Ravi Graves RN.

## 2023-10-04 NOTE — PROGRESS NOTES
Physical Therapy    Physical Therapy Evaluation & Treatment    Patient Name: Bisi Mckay  MRN: [id number]  Today's Date: 10/4/2023   Time Calculation  Start Time: 0928  Stop Time: 1018  Time Calculation (min): 50 min    Assessment/Plan   PT Assessment  PT Assessment Results: Decreased strength, Impaired balance, Decreased mobility  Rehab Prognosis: Good  End of Session Communication: Bedside nurse  End of Session Patient Position: Bed, 3 rail up, Alarm off, not on at start of session (DEP chair position)  IP OR SWING BED PT PLAN  Inpatient or Swing Bed: Inpatient  PT Plan  Treatment/Interventions: Bed mobility, Transfer training, Gait training, Balance training, Neuromuscular re-education, Strengthening, Therapeutic exercise, Therapeutic activity, Positioning, Postural re-education  PT Plan: Skilled PT  PT Frequency: 4 times per week  PT Discharge Recommendations: High intensity level of continued care      Subjective     General Visit Information:  General  Reason for Referral:  (presented to [facility] on 9/25-> weakness and fall; transferred to  [facility]-> acute hypoxic respiratory failure, rheumatologic and neuromuscular consult, intubated on 9/28, now extubated; MRIb-> no acute IC abnormality, CT c-spine (-) fx)  Past Medical History Relevant to Rehab: primary biliary cirrhosis, HTN, GERD, DM, breast cancer, OA, RA?, IBS  Family/Caregiver Present: No  Prior to Session Communication: Bedside nurse  Patient Position Received: Bed, 3 rail up, Alarm off, not on at start of session  Preferred Learning Style: verbal  General Comment: pleasant and agreeable to participate in therapy; global weakness, proximal>distal; neurology consulted-> presentation c/w myopathy  Home Living:  Home Living  Type of Home: House  Lives With: Alone (reports family/friends nearby that can assist as needed)  Home Adaptive Equipment: None  Home Layout: One level, Laundry in basement  Home Access: Stairs to enter with rails  Entrance  Stairs-Rails:  (x1 HR)  Entrance Stairs-Number of Steps: 4  Bathroom Shower/Tub: Tub/shower unit  Bathroom Equipment: Grab bars in shower  Home Living Comments: 10-12 stairs with HR to basement  Prior Level of Function:  Prior Function Per Pt/Caregiver Report  Level of [place]:  (independent household and community ambulation; indep with ADLs/iADLs)  Vocational: Retired  Leisure: enjoys bowling  Hand Dominance: Right  Precautions:  Precautions  Hearing/Visual Limitations: glasses full time  Medical Precautions: Fall precautions  Vital Signs:  Vital Signs  Heart Rate:  (pre: 121, during: HR in 120s sitting EOB, post: 121)  Resp:  (pre: 19 during sitting EOB: 19, post: 17)  SpO2:  (pre: 94%, during sitting EOB: >92% on RA, post: 94%)  BP:  (pre: 127/57 during sitting EOB: 133/70 post: 117/55)    Objective   Pain:  Pain Assessment  Pain Assessment: 0-10  Pain Score: 0 - No pain  Cognition:  Cognition  Overall Cognitive Status: Within Functional Limits  Orientation Level:  (oriented x3)              Sensation  Light Touch:  (denied numbness/tingling)    Postural Control  Postural Control: Impaired  Trunk Control:  (poor, required MAX-DEPx1 assist to maintain sitting EOB without LOB)    Static Sitting Balance  Static Sitting-Balance Support:  (patient's B hands placed on bedside for support, mostly DEPx1, brief MAXx1 after verbal/tactile cueing to shift COM more anterior)  Dynamic Sitting Balance  Dynamic Sitting-Balance:  (retrolean, DEPx1)    Static Standing Balance  Static Standing-Level of Assistance:  (DEPx2 with (B) arm in arm assist + B knee blocking + feet blocking; only able to complete minimal buttocks clearance for lateral scooting towards HOB)  Functional Assessments:  Bed Mobility  Bed Mobility: Yes  Bed Mobility 1  Bed Mobility 1: Supine to sitting, Sitting to supine  Level of Assistance 1: Dependent, Minimal verbal cues  Bed Mobility Comments 1:  (x2 assist + draw sheet, able to minimally laterally  advance BLEs towards EOB with additional assist)    Transfers  Transfer: Yes  Transfer 1  Transfer From 1:  (lateral scoot towards HOB)  Transfer Level of Assistance 1: Dependent, +2 (B arm in arm assist, B knee and feet blocking + draw sheet)    Ambulation/Gait Training  Ambulation/Gait Training Performed: No  Extremity/Trunk Assessments:  RUE   RUE : Exceptions to WFL  RUE AROM (degrees)  RUE AROM Comment:  (digit flexion/ext WFL, limited AROM elbow flexion/ext, AAROM elbow flexion/ext WFL, shoulder flexion AROM ~45 degrees)  RUE PROM (degrees)  RUE PROM Comment:  (WFL)  RUE Strength  RUE Overall Strength:  ( 4-/5, elbow flexion/ext 3-/5, shoulder flexion <3-/5)  LUE   LUE: Exceptions to WFL  LUE AROM (degrees)  LUE AROM Comment:  (digit flexion/ext WFL AROM, limited AROM flexion/ext, AAROM elbow flexion/ext WFL, AROM shoulder flexion ~30 degrees)  LUE PROM (degrees)  LUE PROM Comment:  (WFL)  LUE Strength  LUE Overall Strength:  ( 4-/5, elbow flexion/ext 3-/5, shoulder flexion <3-/5)  RLE   RLE : Exceptions to WFL  AROM RLE (degrees)  RLE AROM Comment:  (PF/DF WFL, knee ext to neutral, knee flexion to 90 degrees via positioning, hip flexion to 90 degrees via positioning, limited proximal AROM d/t weakness)  Strength RLE  RLE Overall Strength:  (DF 4/5, knee ext 3+/5, hip flexion 3/5)  LLE   LLE : Exceptions to WFL  AROM LLE (degrees)  LLE AROM Comment:  (PF/DF WFL, knee ext to neutral, knee flexion to 90 degrees via positioning, hip flexion to 90 degrees via positioning, limited proximal AROM d/t weakness)  Strength LLE  LLE Overall Strength:  (DF 4/5, knee ext 3+/5, hip flexion 3-/5)  Treatments:  Therapeutic Activity  Therapeutic Activity Performed: Yes  Therapeutic Activity 1: EOB x20 mins, DEPx1 mostly with brief MAXx1 with BUE support, cueing to shift COM more anterior over seated FREDY, limited ability to maintain with MAXx1 assist. Cueing for erect posture, scapular activation to assist with  posture.  Therapeutic Activity 2: lateral scooting at bedside x2 trials with DEPx2 with B arm in arm assist + draw sheet + max blocking of B knees/feet in order to promote increase WB'ing and joint approximation through BLEs.  Outcome Measures:  [facility] Basic Mobility  Turning from your back to your side while in a flat bed without using bedrails: Total  Moving from lying on your back to sitting on the side of a flat bed without using bedrails: Total  Moving to and from bed to chair (including a wheelchair): Total  Standing up from a chair using your arms (e.g. wheelchair or bedside chair): Total  To walk in hospital room: Total  Climbing 3-5 steps with railing: Total  Basic Mobility - Total Score: 6    FSS-ICU  Ambulation: Unable to attempt due to weakness  Rolling: Total assistance (performs 25% or requires another person)  Sitting: Total assistance (performs 25% or requires another person)  Transfer Sit-to-Stand: Total assistance (performs 25% or requires another person)  Transfer Supine-to-Sit: Total assistance (performs 25% or requires another person)  Total Score: 4      E = Exercise and Early Mobility  Current Activity: Sitting at edge of bed    Encounter Problems       Encounter Problems (Active)       Balance       patient will complete static (Cgx1) and dynamic (MINx1) sitting balance activities using UE support as needed without acute LOB in order to maintain midline (Progressing)       Start:  10/04/23    Expected End:  10/18/23               Mobility       patient will ambulate >/=3 ft in order to complete functional tranfers without acute LOB with MAXx2 assist.  (Progressing)       Start:  10/04/23    Expected End:  10/18/23               Mobility       patient will participate in BLE there-ex in order to assist in improving strength and to assist with the completion of functional mobility tasks  (Progressing)       Start:  10/04/23    Expected End:  10/18/23               Pain - Adult          Transfers        Patient will complete STS with MODx2 without acute LOB  (Progressing)       Start:  10/04/23    Expected End:  10/18/23            patient will complete supine<->sit with MODx2 with bedrail as needed without acute LOB  (Progressing)       Start:  10/04/23    Expected End:  10/18/23                   Education Documentation  Body Mechanics, taught by Lexi Whitfield PT at 10/4/2023 12:01 PM.  Learner: Patient  Readiness: Acceptance  Method: Explanation  Response: Verbalizes Understanding    Mobility Training, taught by Lexi Whitfield PT at 10/4/2023 12:01 PM.  Learner: Patient  Readiness: Acceptance  Method: Explanation  Response: Verbalizes Understanding    Education Comments  No comments found.

## 2023-10-04 NOTE — PROGRESS NOTES
"Bisi Mckay is a 63 y.o. female on day 3 of admission presenting with Muscle weakness.    Subjective   Patient seen in MICU. Extubated, breathing comfortably on RA. Patient states she is feeling okay. Fatigued. Weakness is constant, not worsening. Denies SOB, vision changes, headache, joint pain.         Objective     Physical Exam  Constitutional:       Comments: Laying in bed, appears tired   HENT:      Head: Normocephalic and atraumatic.      Mouth/Throat:      Mouth: Mucous membranes are moist.   Eyes:      Extraocular Movements: Extraocular movements intact.      Comments: No nystagmus noted on exam, pupils equal    Cardiovascular:      Pulses: Normal pulses.      Heart sounds: Normal heart sounds.   Pulmonary:      Effort: Pulmonary effort is normal.      Breath sounds: Normal breath sounds. No stridor. No wheezing, rhonchi or rales.   Abdominal:      General: Abdomen is flat.      Palpations: Abdomen is soft.      Tenderness: There is no abdominal tenderness.   Musculoskeletal:         General: No swelling or deformity.      Comments: No joint swelling noted. Bilateral LE swelling in feet. No point tenderness   Skin:     General: Skin is warm.      Findings: Bruising present. No rash.      Comments: Bilateral round hypopigmented lesions on cheeks of different size, some skin sloughing at borders. Could represent trauma from vent mask   Neurological:      Mental Status: She is oriented to person, place, and time. She is lethargic.      Cranial Nerves: Cranial nerves 2-12 are intact. No dysarthria or facial asymmetry.      Sensory: No sensory deficit.      Motor: Weakness present. No tremor.         Last Recorded Vitals  Blood pressure 143/61, pulse (!) 115, temperature 36.5 °C (97.7 °F), resp. rate 19, height 1.651 m (5' 5\"), weight 109 kg (240 lb 8.4 oz), SpO2 96 %.  Intake/Output last 3 Shifts:  I/O last 3 completed shifts:  In: 1080.2 (9.9 mL/kg) [P.O.:80; I.V.:245.2 (2.2 mL/kg); NG/GT:255; IV " Piggyback:500]  Out: 2100 (19.2 mL/kg) [Urine:2100 (0.5 mL/kg/hr)]  Weight: 109.1 kg     Relevant Results    Current Facility-Administered Medications:     dextrose 10 % infusion, 0.3 g/kg/hr, intravenous, Once PRN, Daisy Marquez MD, Stopped at 10/02/23 0700    dextrose 50 % injection 25 g, 25 g, intravenous, q15 min PRN, Daisy Marquez MD, 25 g at 10/01/23 1630    esomeprazole (NexIUM) suspension 40 mg, 40 mg, nasoduodenal tube, Daily before breakfast, Regulo Cobos DO, 40 mg at 10/04/23 0646    flu vacc [id number] (65yr up)-PF (Fluzone High-Dose Quad) syringe 0.7 mL, 0.7 mL, intramuscular, During hospitalization, Regulo Cobos DO    gabapentin (Neurontin) solution 300 mg, 300 mg, oral, Daily before breakfast, Daisy Marquez MD, 300 mg at 10/04/23 0646    gabapentin (Neurontin) solution 900 mg, 900 mg, oral, Nightly, Daisy Marquez MD, 900 mg at 10/04/23 0027    glucagon (Glucagen) injection 1 mg, 1 mg, intramuscular, q15 min PRN, Daisy Marquez MD    heparin (porcine) injection 5,000 Units, 5,000 Units, subcutaneous, q8h, Regulo Cobos DO, 5,000 Units at 10/04/23 1629    insulin lispro (HumaLOG) injection 0-5 Units, 0-5 Units, subcutaneous, q4h, Regulo Cobos DO, 1 Units at 10/04/23 1226    pneumococcal conjugate (Prevnar 13) 0.5 mL vaccine 0.5 mL, 0.5 mL, intramuscular, During hospitalization, Regulo Cobos DO    sennosides-docusate sodium (Ramila-Colace) 8.6-50 mg per tablet 1 tablet, 1 tablet, oral, Nightly, Daisy Marquez MD, 1 tablet at 10/02/23 2013     Results for orders placed or performed during the hospital encounter of 10/01/23 (from the past 24 hour(s))   Creatine Kinase   Result Value Ref Range    Creatine Kinase 1,709 (H) 0 - 215 U/L   Sedimentation rate, automated   Result Value Ref Range    Sedimentation Rate 24 0 - 30 mm/h   C-reactive protein   Result Value Ref Range    C-Reactive Protein 13.24 (H) <1.00 mg/dL   CBC   Result Value Ref Range    WBC 9.1 4.4 - 11.3 x10*3/uL     nRBC 0.0 0.0 - 0.0 /100 WBCs    RBC 3.01 (L) 4.00 - 5.20 x10*6/uL    Hemoglobin 9.4 (L) 12.0 - 16.0 g/dL    Hematocrit 27.1 (L) 36.0 - 46.0 %    MCV 90 80 - 100 fL    MCH 31.2 26.0 - 34.0 pg    MCHC 34.7 32.0 - 36.0 g/dL    RDW 14.1 11.5 - 14.5 %    Platelets 305 150 - 450 x10*3/uL    MPV 9.4 7.5 - 11.5 fL   Magnesium   Result Value Ref Range    Magnesium 1.74 1.60 - 2.40 mg/dL   Renal Function Panel   Result Value Ref Range    Glucose 173 (H) 74 - 99 mg/dL    Sodium 148 (H) 136 - 145 mmol/L    Potassium 3.8 3.5 - 5.3 mmol/L    Chloride 113 (H) 98 - 107 mmol/L    Bicarbonate 23 21 - 32 mmol/L    Anion Gap 16 10 - 20 mmol/L    Urea Nitrogen 28 (H) 6 - 23 mg/dL    Creatinine 0.90 0.50 - 1.05 mg/dL    eGFR 71 >60 mL/min/1.73m*2    Calcium 7.7 (L) 8.6 - 10.6 mg/dL    Phosphorus 3.8 2.5 - 4.9 mg/dL    Albumin 2.0 (L) 3.4 - 5.0 g/dL   Blood Gas Venous Full Panel   Result Value Ref Range    POCT pH, Venous 7.48 (H) 7.33 - 7.43 pH    POCT pCO2, Venous 34 (L) 41 - 51 mm Hg    POCT pO2, Venous 67 (H) 35 - 45 mm Hg    POCT SO2, Venous 90 (H) 45 - 75 %    POCT Oxy Hemoglobin, Venous 88.3 (H) 45.0 - 75.0 %    POCT Hematocrit Calculated, Venous 27.0 (L) 36.0 - 46.0 %    POCT Sodium, Venous 141 136 - 145 mmol/L    POCT Potassium, Venous 3.5 3.5 - 5.3 mmol/L    POCT Chloride, Venous 114 (H) 98 - 107 mmol/L    POCT Ionized Calicum, Venous 1.16 1.10 - 1.33 mmol/L    POCT Glucose, Venous 186 (H) 74 - 99 mg/dL    POCT Lactate, Venous 1.1 0.4 - 2.0 mmol/L    POCT Base Excess, Venous 1.9 -2.0 - 3.0 mmol/L    POCT HCO3 Calculated, Venous 25.3 22.0 - 26.0 mmol/L    POCT Hemoglobin, Venous 9.0 (L) 12.0 - 16.0 g/dL    POCT Anion Gap, Venous 5.0 (L) 10.0 - 25.0 mmol/L    Patient Temperature 37.0 degrees Celsius   AST   Result Value Ref Range     (H) 9 - 39 U/L   ALT   Result Value Ref Range     (H) 7 - 45 U/L   Alkaline phosphatase   Result Value Ref Range    Alkaline Phosphatase 85 33 - 136 U/L   Rheumatoid Factor   Result  Value Ref Range    Rheumatoid Factor 11 0 - 15 IU/mL   Cyclic citrul peptide antibody, IgG   Result Value Ref Range    Citrulline Antibody, IgG <1 <3 U/mL   Drug Screen, Urine With Reflex to Confirmation   Result Value Ref Range    Amphetamine Screen, Urine Presumptive Negative Presumptive Negative    Barbiturate Screen, Urine Presumptive Negative Presumptive Negative    Benzodiazepines Screen, Urine Presumptive Negative Presumptive Negative    Cannabinoid Screen, Urine Presumptive Negative Presumptive Negative    Cocaine Metabolite Screen, Urine Presumptive Negative Presumptive Negative    Fentanyl Screen, Urine Presumptive Positive (A) Presumptive Negative    Opiate Screen, Urine Presumptive Negative Presumptive Negative    Oxycodone Screen, Urine Presumptive Negative Presumptive Negative    PCP Screen, Urine Presumptive Negative Presumptive Negative   POCT GLUCOSE   Result Value Ref Range    POCT Glucose 184 (H) 74 - 99 mg/dL   POCT GLUCOSE   Result Value Ref Range    POCT Glucose 150 (H) 74 - 99 mg/dL             Assessment/Plan   Principal Problem:    Muscle weakness  Active Problems:    Acute hypoxic respiratory failure (CMS/HCC)    Diabetes mellitus, type 2 (CMS/HCC)    62 yo F w/ PMHx primary biliary cirrhosis, HTN, GERD, DM, hx of breast cancer s/p lumpectomy and radiation, and osteoarthritis presented to [facility] on 9/25/23 for weakness and falls [facility] for further workup of acute hypoxic respiratory failure suspected s/t muscular weakness and rheumatologic/neurologic work-up.    Patient reports she first started noticing weakness following a trip to [place]. On the 23rd of September she had a fall and later on the 25th she was found down after hours of being on the ground next to her bed, without losing consciousness.   She was admitted to medicine for rhabdo (Cr 1.75, CK 2880). Hospital course c/b UTI, worsening SKIP 2/2 rhabdo (CK peaked at 11.773). On the 28th developed respiratory  failure and got intubated and transferred to the ICU. Patient extubated 10/3. Transferred to floor 10/4 for further work-up of weakness.     #Proximal Muscle Weakness  #AHRF requiring intubation, resolved  #Dysphagia  -ddx: R/o Guillaine Cody, Lyme, MG, Polymyositis, MS, NMJ etiology, DM, necrotizing myopathy  -MRI brain 9/10 with minimal chronic microvascular angiopathy. No acute intracranial abnormality.   -CT head and C-spine 9/25 no acute abnormality.   -CXR unremarkable, respiratory panel negative, MRSA nares negative  -Anti-Ach, Lyme 2 step, NORMA, aldolase, ANCA neagative  -Anti-SSA/SSB negative, Anti-Lamar negative  -ESR wnl, CRP 13 (high)  -TSH low, fT4 wnl, anti-TPO ab wnl  -B12: >2000  -C3 132, C4 56 (high), Ceruloplasmin 37 (wnl)  -Cryoglobulin neg   -AM cortisol: wnl   -LDH  >750  -RPR, HIV negative  -CSF Studies: E coli, H flu, listeria, Neisseria meningitides, Strep agalactiae, S pneumo, CMV, enterovirus, HVS ½, HHV 6, human parechovirus, VZV, Cryptococcus - ALL NEGATIVE   -CSF Culture (9/29/23)- no organisms seen  -Blood cultures and fungal cultures negative  -Neurology consulted, appreciate recs  -proximal pattern of weakness (deltoids, hip flexors >) localizes to anterior horn cell or below suggestive of NMJ-opathy   or myopathy, more likely myopathy given elevated CK  -cholinesterase receptor antibodies, rheum labs  -send antimitochondrial Ab  -inpatient EMG  -no need for MRI  -Rheumatology consulted, appreciate recs  -follow-up NORMA/GERRY, extended myositis panel, hep C, hep b ag, ab and core total ab, TB-SPOT, vitamin D  -urine myoglobin and repeat UA  -daily trend for CPK, LFT's  -EMG   -swallowing evaluation and consider NIF test   -Pending above workup, will decide on the need for MRI/Muscle biopsy and further workup to determine the etiology.   -SLP consulted, NPO w/ ice chips, aggressive oral hygeine   -Dobhoff placed for TF  -nutrition consulted    #Tachycardia  -HR 110s-120s starting 10/4  -ECG  "NSR  -s/p 1.5L w/o resolution    #SKIP, resolving  #Rhabdomyolysis  #HTN  -etiology of SKIP likely ATN s/t rhabdo  -CK elevated, 2k on admission -> peaked 11,773 (9/30) -> now 1.7K  -downtrended with IV fluids  -Baseline Cr 0.7, Cr 1.75 on admission  -Renal US 9/27 only significant for borderline increased cortical echogenicity  -UA (9/29): large blood, 3-5 RBCs, +protein, trace leuk est, 1+ bacteria, +casts  -hold home BP meds  -daily CK  -daily RFP    #Neurogenic bladder?  -patient has bladder stimulating device placed in 2021 for \"frequent urination\"  -rae removed  -will check bladder scan this evening  -device has \"M-mode\" that is MRI safe    #UTI, resolved  -reportedly completed course CTX starting 9/26, not seen in EMR    #T2DM  -hold home metformin  -hypoglycemia protocol  -POCT glucose q4    F: prn  E: replete prn  N: TFs via DH  GI ppx: PPI  DVT: SQH  Access: PIVs    Dispo: pending further work-up    Code status: full code (confirmed on floor transfer)      To be staffed in AM.           Lesly Parra MD      "

## 2023-10-04 NOTE — CARE PLAN
Problem: Balance  Goal: patient will complete static (Cgx1) and dynamic (MINx1) sitting balance activities using UE support as needed without acute LOB in order to maintain midline  Outcome: Progressing     Problem: Mobility  Goal: patient will ambulate >/=3 ft in order to complete functional tranfers without acute LOB with MAXx2 assist.   Outcome: Progressing     Problem: Transfers  Goal: Patient will complete STS with MODx2 without acute LOB   Outcome: Progressing  Goal: patient will complete supine<->sit with MODx2 with bedrail as needed without acute LOB   Outcome: Progressing     Problem: Mobility  Goal: patient will participate in BLE there-ex in order to assist in improving strength and to assist with the completion of functional mobility tasks   Outcome: Progressing

## 2023-10-05 LAB
25(OH)D3 SERPL-MCNC: 57 NG/ML (ref 30–100)
ALBUMIN SERPL BCP-MCNC: 2.1 G/DL (ref 3.4–5)
ALP SERPL-CCNC: 82 U/L (ref 33–136)
ALT SERPL W P-5'-P-CCNC: 114 U/L (ref 7–45)
ANION GAP SERPL CALC-SCNC: 13 MMOL/L (ref 10–20)
AST SERPL W P-5'-P-CCNC: 104 U/L (ref 9–39)
BASOPHILS # BLD AUTO: 0.05 X10*3/UL (ref 0–0.1)
BASOPHILS NFR BLD AUTO: 0.6 %
BILIRUB SERPL-MCNC: 0.5 MG/DL (ref 0–1.2)
BUN SERPL-MCNC: 26 MG/DL (ref 6–23)
CALCIUM SERPL-MCNC: 7.8 MG/DL (ref 8.6–10.6)
CARDIAC TROPONIN I PNL SERPL HS: 148 NG/L (ref 0–34)
CARDIAC TROPONIN I PNL SERPL HS: 95 NG/L (ref 0–34)
CHLORIDE SERPL-SCNC: 112 MMOL/L (ref 98–107)
CK SERPL-CCNC: 1770 U/L (ref 0–215)
CO2 SERPL-SCNC: 27 MMOL/L (ref 21–32)
CREAT SERPL-MCNC: 0.81 MG/DL (ref 0.5–1.05)
EOSINOPHIL # BLD AUTO: 0.07 X10*3/UL (ref 0–0.7)
EOSINOPHIL NFR BLD AUTO: 0.8 %
ERYTHROCYTE [DISTWIDTH] IN BLOOD BY AUTOMATED COUNT: 14.2 % (ref 11.5–14.5)
GFR SERPL CREATININE-BSD FRML MDRD: 81 ML/MIN/1.73M*2
GLUCOSE BLD MANUAL STRIP-MCNC: 148 MG/DL (ref 74–99)
GLUCOSE BLD MANUAL STRIP-MCNC: 170 MG/DL (ref 74–99)
GLUCOSE BLD MANUAL STRIP-MCNC: 174 MG/DL (ref 74–99)
GLUCOSE BLD MANUAL STRIP-MCNC: 176 MG/DL (ref 74–99)
GLUCOSE BLD MANUAL STRIP-MCNC: 189 MG/DL (ref 74–99)
GLUCOSE BLD MANUAL STRIP-MCNC: 194 MG/DL (ref 74–99)
GLUCOSE SERPL-MCNC: 199 MG/DL (ref 74–99)
HCT VFR BLD AUTO: 31.3 % (ref 36–46)
HGB BLD-MCNC: 9.5 G/DL (ref 12–16)
IMM GRANULOCYTES # BLD AUTO: 0.39 X10*3/UL (ref 0–0.7)
IMM GRANULOCYTES NFR BLD AUTO: 4.4 % (ref 0–0.9)
LYMPHOCYTES # BLD AUTO: 1.59 X10*3/UL (ref 1.2–4.8)
LYMPHOCYTES NFR BLD AUTO: 18.1 %
MAGNESIUM SERPL-MCNC: 1.89 MG/DL (ref 1.6–2.4)
MCH RBC QN AUTO: 30 PG (ref 26–34)
MCHC RBC AUTO-ENTMCNC: 30.4 G/DL (ref 32–36)
MCV RBC AUTO: 99 FL (ref 80–100)
MITOCHONDRIA AB SER QL IF: NEGATIVE
MONOCYTES # BLD AUTO: 0.44 X10*3/UL (ref 0.1–1)
MONOCYTES NFR BLD AUTO: 5 %
MYOGLOBIN UR QL: POSITIVE
NEUTROPHILS # BLD AUTO: 6.23 X10*3/UL (ref 1.2–7.7)
NEUTROPHILS NFR BLD AUTO: 71.1 %
NRBC BLD-RTO: 0 /100 WBCS (ref 0–0)
PHOSPHATE SERPL-MCNC: 3.6 MG/DL (ref 2.5–4.9)
PLATELET # BLD AUTO: 386 X10*3/UL (ref 150–450)
PMV BLD AUTO: 8.8 FL (ref 7.5–11.5)
POTASSIUM SERPL-SCNC: 3.8 MMOL/L (ref 3.5–5.3)
PROT SERPL-MCNC: 4.3 G/DL (ref 6.4–8.2)
RBC # BLD AUTO: 3.17 X10*6/UL (ref 4–5.2)
SODIUM SERPL-SCNC: 148 MMOL/L (ref 136–145)
WBC # BLD AUTO: 8.8 X10*3/UL (ref 4.4–11.3)

## 2023-10-05 PROCEDURE — 85025 COMPLETE CBC W/AUTO DIFF WBC: CPT | Performed by: STUDENT IN AN ORGANIZED HEALTH CARE EDUCATION/TRAINING PROGRAM

## 2023-10-05 PROCEDURE — 96372 THER/PROPH/DIAG INJ SC/IM: CPT

## 2023-10-05 PROCEDURE — 2500000004 HC RX 250 GENERAL PHARMACY W/ HCPCS (ALT 636 FOR OP/ED)

## 2023-10-05 PROCEDURE — 97530 THERAPEUTIC ACTIVITIES: CPT | Mod: GP | Performed by: PHYSICAL THERAPIST

## 2023-10-05 PROCEDURE — 51702 INSERT TEMP BLADDER CATH: CPT

## 2023-10-05 PROCEDURE — 86706 HEP B SURFACE ANTIBODY: CPT

## 2023-10-05 PROCEDURE — 36415 COLL VENOUS BLD VENIPUNCTURE: CPT | Performed by: STUDENT IN AN ORGANIZED HEALTH CARE EDUCATION/TRAINING PROGRAM

## 2023-10-05 PROCEDURE — 86803 HEPATITIS C AB TEST: CPT

## 2023-10-05 PROCEDURE — 82306 VITAMIN D 25 HYDROXY: CPT

## 2023-10-05 PROCEDURE — 82947 ASSAY GLUCOSE BLOOD QUANT: CPT

## 2023-10-05 PROCEDURE — 97530 THERAPEUTIC ACTIVITIES: CPT | Mod: GO

## 2023-10-05 PROCEDURE — 87340 HEPATITIS B SURFACE AG IA: CPT

## 2023-10-05 PROCEDURE — 51701 INSERT BLADDER CATHETER: CPT

## 2023-10-05 PROCEDURE — 99233 SBSQ HOSP IP/OBS HIGH 50: CPT

## 2023-10-05 PROCEDURE — 2500000002 HC RX 250 W HCPCS SELF ADMINISTERED DRUGS (ALT 637 FOR MEDICARE OP, ALT 636 FOR OP/ED)

## 2023-10-05 PROCEDURE — 92526 ORAL FUNCTION THERAPY: CPT | Mod: GN | Performed by: SPEECH-LANGUAGE PATHOLOGIST

## 2023-10-05 PROCEDURE — 82550 ASSAY OF CK (CPK): CPT

## 2023-10-05 PROCEDURE — 36415 COLL VENOUS BLD VENIPUNCTURE: CPT

## 2023-10-05 PROCEDURE — 84100 ASSAY OF PHOSPHORUS: CPT

## 2023-10-05 PROCEDURE — 84075 ASSAY ALKALINE PHOSPHATASE: CPT

## 2023-10-05 PROCEDURE — 2500000001 HC RX 250 WO HCPCS SELF ADMINISTERED DRUGS (ALT 637 FOR MEDICARE OP)

## 2023-10-05 PROCEDURE — 86704 HEP B CORE ANTIBODY TOTAL: CPT

## 2023-10-05 PROCEDURE — 97110 THERAPEUTIC EXERCISES: CPT | Mod: GP | Performed by: PHYSICAL THERAPIST

## 2023-10-05 PROCEDURE — 84484 ASSAY OF TROPONIN QUANT: CPT

## 2023-10-05 PROCEDURE — 83735 ASSAY OF MAGNESIUM: CPT

## 2023-10-05 PROCEDURE — 74230 X-RAY XM SWLNG FUNCJ C+: CPT | Performed by: RADIOLOGY

## 2023-10-05 PROCEDURE — 97166 OT EVAL MOD COMPLEX 45 MIN: CPT | Mod: GO

## 2023-10-05 PROCEDURE — 1100000001 HC PRIVATE ROOM DAILY

## 2023-10-05 PROCEDURE — 99233 SBSQ HOSP IP/OBS HIGH 50: CPT | Performed by: INTERNAL MEDICINE

## 2023-10-05 RX ORDER — INSULIN LISPRO 100 [IU]/ML
0-10 INJECTION, SOLUTION INTRAVENOUS; SUBCUTANEOUS
Status: DISCONTINUED | OUTPATIENT
Start: 2023-10-05 | End: 2023-10-06

## 2023-10-05 RX ADMIN — INSULIN LISPRO 1 UNITS: 100 INJECTION, SOLUTION INTRAVENOUS; SUBCUTANEOUS at 14:39

## 2023-10-05 RX ADMIN — INSULIN LISPRO 1 UNITS: 100 INJECTION, SOLUTION INTRAVENOUS; SUBCUTANEOUS at 02:05

## 2023-10-05 RX ADMIN — ESOMEPRAZOLE MAGNESIUM 40 MG: 40 FOR SUSPENSION ORAL at 09:40

## 2023-10-05 RX ADMIN — GABAPENTIN 300 MG: 250 SOLUTION ORAL at 16:55

## 2023-10-05 RX ADMIN — GABAPENTIN 900 MG: 250 SOLUTION ORAL at 22:30

## 2023-10-05 RX ADMIN — HEPARIN SODIUM 5000 UNITS: 5000 INJECTION INTRAVENOUS; SUBCUTANEOUS at 16:55

## 2023-10-05 RX ADMIN — HEPARIN SODIUM 5000 UNITS: 5000 INJECTION INTRAVENOUS; SUBCUTANEOUS at 09:41

## 2023-10-05 RX ADMIN — INSULIN LISPRO 2 UNITS: 100 INJECTION, SOLUTION INTRAVENOUS; SUBCUTANEOUS at 20:18

## 2023-10-05 RX ADMIN — INSULIN LISPRO 1 UNITS: 100 INJECTION, SOLUTION INTRAVENOUS; SUBCUTANEOUS at 09:41

## 2023-10-05 RX ADMIN — HEPARIN SODIUM 5000 UNITS: 5000 INJECTION INTRAVENOUS; SUBCUTANEOUS at 02:05

## 2023-10-05 ASSESSMENT — ACTIVITIES OF DAILY LIVING (ADL)
ADL_ASSISTANCE: INDEPENDENT
HOME_MANAGEMENT_TIME_ENTRY: 5
BATHING_ASSISTANCE: TOTAL

## 2023-10-05 ASSESSMENT — COGNITIVE AND FUNCTIONAL STATUS - GENERAL
MOBILITY SCORE: 9
CLIMB 3 TO 5 STEPS WITH RAILING: TOTAL
MOVING TO AND FROM BED TO CHAIR: TOTAL
CLIMB 3 TO 5 STEPS WITH RAILING: TOTAL
DRESSING REGULAR UPPER BODY CLOTHING: TOTAL
DRESSING REGULAR UPPER BODY CLOTHING: A LOT
HELP NEEDED FOR BATHING: TOTAL
DRESSING REGULAR LOWER BODY CLOTHING: TOTAL
PERSONAL GROOMING: TOTAL
TURNING FROM BACK TO SIDE WHILE IN FLAT BAD: A LOT
PERSONAL GROOMING: A LOT
MOVING FROM LYING ON BACK TO SITTING ON SIDE OF FLAT BED WITH BEDRAILS: A LOT
WALKING IN HOSPITAL ROOM: TOTAL
DAILY ACTIVITIY SCORE: 9
MOBILITY SCORE: 6
EATING MEALS: TOTAL
TOILETING: TOTAL
TOILETING: TOTAL
STANDING UP FROM CHAIR USING ARMS: TOTAL
TURNING FROM BACK TO SIDE WHILE IN FLAT BAD: TOTAL
STANDING UP FROM CHAIR USING ARMS: A LOT
DRESSING REGULAR LOWER BODY CLOTHING: TOTAL
HELP NEEDED FOR BATHING: A LOT
EATING MEALS: TOTAL
DAILY ACTIVITIY SCORE: 6
WALKING IN HOSPITAL ROOM: TOTAL
MOVING TO AND FROM BED TO CHAIR: TOTAL
MOVING FROM LYING ON BACK TO SITTING ON SIDE OF FLAT BED WITH BEDRAILS: TOTAL

## 2023-10-05 ASSESSMENT — PAIN - FUNCTIONAL ASSESSMENT
PAIN_FUNCTIONAL_ASSESSMENT: CPOT (CRITICAL CARE PAIN OBSERVATION TOOL)
PAIN_FUNCTIONAL_ASSESSMENT: 0-10
PAIN_FUNCTIONAL_ASSESSMENT: 0-10

## 2023-10-05 ASSESSMENT — PAIN SCALES - GENERAL
PAINLEVEL_OUTOF10: 0 - NO PAIN
PAINLEVEL_OUTOF10: 0 - NO PAIN

## 2023-10-05 NOTE — PROGRESS NOTES
Physical Therapy    Physical Therapy Treatment    Patient Name: Bisi Mckay  MRN: [id number]  Today's Date: 10/5/2023  Time Calculation  Start Time: 1147  Stop Time: 1220  Time Calculation (min): 33 min       Assessment/Plan   PT Assessment  PT Assessment Results: Decreased strength, Impaired balance, Decreased mobility  Rehab Prognosis: Good  End of Session Communication: Bedside nurse  Assessment Comment: pt continues to be motivated  End of Session Patient Position: Bed, 3 rail up, Alarm off, not on at start of session  PT Plan  Inpatient/Swing Bed or Outpatient: Inpatient  PT Plan  Treatment/Interventions: Bed mobility, Transfer training, Gait training, Balance training, Neuromuscular re-education, Strengthening, Therapeutic exercise, Therapeutic activity, Positioning, Postural re-education  PT Plan: Skilled PT  PT Frequency: 4 times per week  PT Discharge Recommendations: High intensity level of continued care      General Visit Information:   PT  Visit  PT Received On: 10/05/23  General  Reason for Referral: weakness  Co-Treatment: OT  Co-Treatment Reason: co treated with OT to maximize benefit and safety  Patient Position Received: Bed, 3 rail up  General Comment: pleasant and agreeable to participate in therapy; global weakness, proximal>distal; neurology consulted-> presentation c/w myopathy    Subjective   Precautions:  Precautions  Hearing/Visual Limitations: glasses full time  Medical Precautions: Fall precautions  Vital Signs:  Vital Signs  Heart Rate: 107  SpO2: 95 %  BP: 152/82  Patient Position: Lying    Objective   Pain:  Pain Assessment  Pain Assessment: 0-10  Pain Score: 0 - No pain  Cognition:  Cognition  Overall Cognitive Status: Within Functional Limits  Orientation Level: Oriented X4  Postural Control:       Activity Tolerance:  Activity Tolerance  Endurance:  (sat EOB for 10 mins with varying assist)  Treatments:  Therapeutic Exercise  Therapeutic Exercise Activity 1: supine AP 1 x 10  B  Therapeutic Exercise Activity 2: supine HS 1 x 10 B  Therapeutic Exercise Activity 3: supine Hip abd/add 1 x 10 B    Therapeutic Activity  Therapeutic Activity Performed: Yes  Therapeutic Activity 1: pt tolerated sitting EOB for 15 mins with varying assist of min to max assist ( initially) pt required cues for anterior WS. increase assist as well with fatigue.    Bed Mobility  Bed Mobility: Yes  Bed Mobility 1  Bed Mobility 1: Supine to sitting, Sitting to supine  Level of Assistance 1:  (max assist x 2 with verbal cues)  Bed Mobility Comments 1:  (use of draw sheet, HOB raised and bed rail)  Bed Mobility 2  Bed Mobility  2: Sitting to supine  Level of Assistance 2:  (max assist x 2)  Bed Mobility 3  Bed Mobility 3: Rolling right, Rolling left  Level of Assistance 3: Maximum assistance  Bed Mobility 4  Bed Mobility 4: Scooting  Level of Assistance 4:  (dep x 2)    Transfers  Transfer: Yes  Transfer 1  Transfer From 1: Sit to  Transfer to 1: Stand  Transfer Level of Assistance 1: Dependent, +2  Trials/Comments 1: x 2 attempts from EOB . first unsuccessful with arm in arm x 2 assist with blocking of knees. second attempt. ARM in arm with B knees blocked with PT in front and OT in back assisting with drawsheet. Cued for extension to facilitate transfer and posture.    Outcome Measures:  [facility] Basic Mobility  Turning from your back to your side while in a flat bed without using bedrails: A lot  Moving from lying on your back to sitting on the side of a flat bed without using bedrails: A lot  Moving to and from bed to chair (including a wheelchair): Total  Standing up from a chair using your arms (e.g. wheelchair or bedside chair): A lot  To walk in hospital room: Total  Climbing 3-5 steps with railing: Total  Basic Mobility - Total Score: 9    Education Documentation  Body Mechanics, taught by Etta Drake, PT at 10/5/2023  1:40 PM.  Learner: Patient  Readiness: Eager  Method: Explanation  Response:  Demonstrated Understanding    Mobility Training, taught by Etta Drake, PT at 10/5/2023  1:40 PM.  Learner: Patient  Readiness: Eager  Method: Explanation  Response: Demonstrated Understanding    Education Comments  No comments found.        OP EDUCATION:       Encounter Problems       Encounter Problems (Active)       Balance       patient will complete static (Cgx1) and dynamic (MINx1) sitting balance activities using UE support as needed without acute LOB in order to maintain midline (Progressing)       Start:  10/04/23    Expected End:  10/18/23               Mobility       patient will ambulate >/=3 ft in order to complete functional tranfers without acute LOB with MAXx2 assist.  (Progressing)       Start:  10/04/23    Expected End:  10/18/23               Mobility       patient will participate in BLE there-ex in order to assist in improving strength and to assist with the completion of functional mobility tasks  (Progressing)       Start:  10/04/23    Expected End:  10/18/23               Pain - Adult          Transfers       Patient will complete STS with MODx2 without acute LOB  (Progressing)       Start:  10/04/23    Expected End:  10/18/23            patient will complete supine<->sit with MODx2 with bedrail as needed without acute LOB  (Progressing)       Start:  10/04/23    Expected End:  10/18/23

## 2023-10-05 NOTE — PROGRESS NOTES
Occupational Therapy    Evaluation/Treatment    Patient Name: Bisi Mckay  MRN: [id number]  : 1960  Today's Date: 10/05/23  Time Calculation  Start Time: 1148  Stop Time: 1220  Time Calculation (min): 32 min     Assessment:  OT Assessment: Pt presents with decreased strength and AROM BUEs/LEs, decreased activity tolerance and reduced balance resulting in increased need for assist with ADLs, transfers and functional mobility. Pt would benefit from skilled OT to address deficits, maximize independence and facilitate return to PLOF.  Prognosis: Good  Barriers to Discharge:  (home alone)  Evaluation/Treatment Tolerance: Patient tolerated treatment well, Patient limited by fatigue  OT Assessment Results: Decreased ADL status, Decreased upper extremity range of motion, Decreased upper extremity strength, Decreased endurance, Decreased functional mobility  Prognosis: Good  Barriers to Discharge:  (home alone)  Evaluation/Treatment Tolerance: Patient tolerated treatment well, Patient limited by fatigue  Plan:  Treatment Interventions: ADL retraining, Functional transfer training, UE strengthening/ROM, Endurance training, Compensatory technique education  OT Frequency: 4 times per week  OT Discharge Recommendations: High intensity level of continued care  Treatment Interventions: ADL retraining, Functional transfer training, UE strengthening/ROM, Endurance training, Compensatory technique education    Subjective   Current Problem:  1. Acute hypoxic respiratory failure (CMS/HCC)          General:   OT Received On: 10/05/23  General  Reason for Referral: Weakness + fall, acute hypoxic respiratory failure  Past Medical History Relevant to Rehab: primary biliary cirrhosis, HTN, GERD, DM, breast cancer, OA, RA?, IBS  Co-Treatment: PT  Co-Treatment Reason: cotx with PT for max safety with functional transfers/mobility  Prior to Session Communication: Bedside nurse  Patient Position Received: Bed, 3 rail up, Alarm off, not  on at start of session  General Comment: Pt pleasant and receptive to therapy. Particpates fully, fatigues quickly with EOB sitting, tolerates x10 minutes. Achieved full stand this date with Depx2.  Precautions:  Medical Precautions: Fall precautions  Vital Signs:  Heart Rate: 107  Heart Rate Source: Monitor  SpO2: 95 %  BP: 152/82 (supine at brandon of session)  Patient Position: Lying  Pain:  Pain Assessment  Pain Assessment: 0-10  Pain Score: 0 - No pain    Objective   Cognition:  Overall Cognitive Status: Within Functional Limits  Orientation Level: Oriented X4           Home Living:  Type of Home: House  Lives With: Alone  Home Adaptive Equipment: None  Home Layout: One level, Laundry in basement  Home Access: Stairs to enter with rails  Entrance Stairs-Number of Steps: 4  Bathroom Shower/Tub: Tub/shower unit  Bathroom Equipment: Grab bars in shower  Home Living Comments:  (full flight of stairs down to basement)  Prior Function:  Level of [place]: Independent with ADLs and functional transfers, Independent with homemaking with ambulation  ADL Assistance: Independent  Homemaking Assistance: Independent  Ambulatory Assistance: Independent  Hand Dominance: Right  Prior Function Comments: +drives  IADL History:     ADL:  Eating Assistance: Total  Eating Deficit: NPO  Grooming Assistance: Maximal  Grooming Deficit: Wash/dry face, Increased time to complete, Verbal cueing, Steadying, Setup (hand over hand assist)  Bathing Assistance: Total (anticipated)  UE Dressing Assistance: Maximal (anticipated)  LE Dressing Assistance: Total  Toileting Assistance with Device: Total  Activities of Daily Living:      Grooming  Grooming Level of Assistance: Maximum verbal cues  Grooming Where Assessed: Edge of bed  Grooming Comments: Pt performed face hygiene seated at EOB with hand over hand assist d/t limited AROM R UE and faitgue.    Activity Tolerance:  Endurance:  (Tolerates x10 minutes EOB sitting)  Functional Standing  Tolerance:  Time: 20 seconds  Functional Standing Tolerance Comments:  (Pt stood with Depx2. PT providing assist anteriorly + blocking B knees while this OT assisting posteriorly via draw sheet at hips.)  Bed Mobility/Transfers: Bed Mobility  Bed Mobility: Yes  Bed Mobility 1  Bed Mobility 1: Supine to sitting  Level of Assistance 1:  (Max Ax2 + cues)  Bed Mobility Comments 1:  (HOB elevated, use of draw sheet and bed rail)  Bed Mobility 2  Bed Mobility  2: Sitting to supine  Level of Assistance 2:  (Max Ax2)  Bed Mobility 3  Bed Mobility 3: Rolling right, Rolling left  Level of Assistance 3: Maximum assistance, Minimal verbal cues, Minimal tactile cues  Bed Mobility 4  Bed Mobility 4: Scooting  Level of Assistance 4:  (Dep x2)    Transfers  Transfer: Yes  Transfer 1  Transfer From 1: Sit to  Transfer to 1: Stand  Transfer Level of Assistance 1: Dependent (x2)  Trials/Comments 1: x2 attempts from EOB. 1st unsuccessful with us of FWW and bilateral assist. 2nd trial, PT provided anterior assist + blicking of B knees and this OT assist from hips with use of drawsheet. Cued for hip and trunk extnesion to faciliate upright posturing.    Therapy/Activity: Therapeutic Activity  Therapeutic Activity Performed: Yes  Therapeutic Activity 1: Pt tolerated sitting at EOB x10 minutes with varying assist of min-max Ax1. Verbal and tactile cues for anterior WS. Pt required increased assist to sustain sitting balance as she fatigued.    Sensation:  Sensation Comment: denies numbness or tingling  Strength:  Strength Comments: B UE 2-/5 (assess in supine)    Hand Function:  Hand Function  Gross Grasp: Functional  Extremities: RUE AROM (degrees)  RUE AROM Comment:  (shoulder flexion AROM 0-~35 degrees, elbow flexion/extnesion WFL.)  RUE PROM (degrees)  RUE PROM Comment: WFL and LUE AROM (degrees)  LUE AROM Comment: shoulder flexion 0-~35 degrees, elbow flexion/extension WFL  LUE PROM (degrees)  LUE PROM Comment: WFL    Outcome  Measures: [facility] Daily Activity  Putting on and taking off regular lower body clothing: Total  Bathing (including washing, rinsing, drying): A lot  Putting on and taking off regular upper body clothing: A lot  Toileting, which includes using toilet, bedpan or urinal: Total  Taking care of personal grooming such as brushing teeth: A lot  Eating Meals: Total  Daily Activity - Total Score: 9     and Brief Confusion Assessment Method (bCAM)  Feature 1: Altered Mental Status or Fluctuating Course: No  CAM Result: CAM -    Education Documentation  Body Mechanics, taught by Anna Calderon OT at 10/5/2023  1:10 PM.  Learner: Patient  Readiness: Acceptance  Method: Explanation, Demonstration  Response: Verbalizes Understanding, Needs Reinforcement    Precautions, taught by Anna Calderon OT at 10/5/2023  1:10 PM.  Learner: Patient  Readiness: Acceptance  Method: Explanation, Demonstration  Response: Verbalizes Understanding, Needs Reinforcement    ADL Training, taught by Anna Calderon OT at 10/5/2023  1:10 PM.  Learner: Patient  Readiness: Acceptance  Method: Explanation, Demonstration  Response: Verbalizes Understanding, Needs Reinforcement    Education Comments  No comments found.        OP EDUCATION:       Goals:  Encounter Problems       Encounter Problems (Active)       ADLs       Patient will perform UB and LB bathing with minimal assist  level of assistance. (Progressing)       Start:  10/05/23    Expected End:  10/19/23            Patient with complete upper body dressing with minimal assist  level of assistance donning and doffing all UE clothes with PRN adaptive equipment while edge of bed  (Progressing)       Start:  10/05/23    Expected End:  10/19/23            Patient with complete lower body dressing with minimal assist  level of assistance donning and doffing all LE clothes  with PRN adaptive equipment (Progressing)       Start:  10/05/23    Expected End:  10/19/23            Patient will complete daily  grooming tasks brushing teeth and washing face/hair with minimal assist  level of assistance and PRN adaptive equipment while edge of bed . (Progressing)       Start:  10/05/23    Expected End:  10/19/23            Patient will complete toileting including hygiene clothing management/hygiene with minimal assist  level of assistance and bedside commode. (Progressing)       Start:  10/05/23    Expected End:  10/19/23               BALANCE       Patientt will maintain static standing balance during ADL task with minimal assist  level of assistance drop down in order to demonstrate decreased risk of falling and improved postural control. (Progressing)       Start:  10/05/23    Expected End:  10/19/23               Balance       patient will complete static (Cgx1) and dynamic (MINx1) sitting balance activities using UE support as needed without acute LOB in order to maintain midline (Progressing)       Start:  10/04/23    Expected End:  10/18/23               Mobility       patient will ambulate >/=3 ft in order to complete functional tranfers without acute LOB with MAXx2 assist.  (Progressing)       Start:  10/04/23    Expected End:  10/18/23               Mobility       patient will participate in BLE there-ex in order to assist in improving strength and to assist with the completion of functional mobility tasks  (Progressing)       Start:  10/04/23    Expected End:  10/18/23               TRANSFERS       Patient will perform bed mobility minimal assist  level of assistance and bed rails in order to improve safety and independence with mobility (Progressing)       Start:  10/05/23    Expected End:  10/19/23            Patient will complete functional transfer to sit<>stand, toilet, chair with least restrictive device with minimal assist  level of assistance. (Progressing)       Start:  10/05/23    Expected End:  10/19/23               Transfers       Patient will complete STS with MODx2 without acute LOB  (Progressing)        Start:  10/04/23    Expected End:  10/18/23            patient will complete supine<->sit with MODx2 with bedrail as needed without acute LOB  (Progressing)       Start:  10/04/23    Expected End:  10/18/23

## 2023-10-05 NOTE — PROGRESS NOTES
SW followed up with [facility] regarding if referral was placed for pt due to pt being listed as self-pay. [facility] updated.    - Samantha Ledesma MSW, LSW

## 2023-10-05 NOTE — PROGRESS NOTES
Speech-Language Pathology    Inpatient  Speech-Language Pathology Treatment     Patient Name: iBsi Mckay  MRN: [id number]  Today's Date: 10/5/2023  Time Calculation  Start Time: 0930  Stop Time: 0948  Time Calculation (min): 18 min         Current Problem:   Patient Active Problem List   Diagnosis    Acute hypoxic respiratory failure (CMS/HCC)    Muscle weakness    Diabetes mellitus, type 2 (CMS/HCC)         SLP Assessment:  -Suspected oropharyngeal dysphagia:   - Re-assessment today consistent with continued suspected oropharyngeal impairments characterized by s/sx of penetration/aspiration, following 3 oz protocol.    - Showed relative improvement in vocal quality and vocal volume. Oral-motor function grossly intact.    - Discussed POC for dysphagia. Recommended MBS on 10/6 to better inform diet/nutrition recs and further investigate pharyngeal pathophysiology.     Prognosis:  (Pending ongoing work-up)  Treatment Tolerance: Patient tolerated treatment well       Plan:  Inpatient/Swing Bed or Outpatient: Inpatient  SLP TX Plan: Continue Plan of Care  SLP Plan: Skilled SLP  SLP Frequency: 3x per week  Duration: 2 weeks  Discussed POC: Patient, Caregiver/family  Discussed Risks/Benefits: Yes, Patient, Caregiver/Family  Patient/Caregiver Agreeable: Yes      Subjective   Current Problem:  Pt received resting in bed  A/O x 4. Vocal quality and vocal strength subjectively improved vs 10/3 evaluation. Speech intelligible-  speech rate mildly decreased  UE weakness; able to hold cup but cannot bring to mouth without assistance    Most Recent Visit:  SLP Received On: 10/05/23    General Visit Information:   Reason for Referral: s/p extubation; progressive swallow/speech changes  Arrival: Family/caregiver present        Objective       Therapeutic Swallow:  - Therapeutic Swallow Intervention : PO Trials (Ice chips x 10 reps; water via straw progressing to 3 oz successive sips    - The 3 oz sequential drinks of thin liquid  water was utilized as a reliable, evidence based test to rule out silent aspiration and determine need for additional testing, such as the MBS or FEES (fiberoptic endoscopic evaluation of swallow), if the test is equivocal, incomplete or pt shows s/sx of aspiration,  prior to recommending a oral diet    - Adequate bolus formation/control given full oral clearance with puree and regular solid consistencies.  Pt given and tolerated ice chips, tsp sips, straw sips of water- immediate coughing following 3 oz successive sips      Liquid Diet Recommendations: NPO, Ice chips after oral care      Encounter Problems       Encounter Problems (Active)       Swallowing       LTG - Patient will tolerate the least restrictive diet without overt difficulty by time of discharge.       Start:  10/05/23    Expected End:  10/19/23            STG - Patient will implement safe swallowing strategies to reduce risk of aspiration given caregiver assistance/cueing as needed.        Start:  10/05/23    Expected End:  10/19/23       progressing         STG - Patient will participate in a Modified Barium Swallow Study.       Start:  10/05/23    Expected End:  10/19/23       Will schedule for 10/6                Outpatient Education:         Inpatient:  Education Documentation  No documentation found.  Education Comments  No comments found.      Ashutosh Hardwick M.A. CCC-SLP

## 2023-10-05 NOTE — CARE PLAN
Problem: ADLs  Goal: Patient will perform UB and LB bathing with minimal assist  level of assistance.  Outcome: Progressing  Goal: Patient with complete upper body dressing with minimal assist  level of assistance donning and doffing all UE clothes with PRN adaptive equipment while edge of bed   Outcome: Progressing  Goal: Patient with complete lower body dressing with minimal assist  level of assistance donning and doffing all LE clothes  with PRN adaptive equipment  Outcome: Progressing  Goal: Patient will complete daily grooming tasks brushing teeth and washing face/hair with minimal assist  level of assistance and PRN adaptive equipment while edge of bed .  Outcome: Progressing  Goal: Patient will complete toileting including hygiene clothing management/hygiene with minimal assist  level of assistance and bedside commode.  Outcome: Progressing     Problem: TRANSFERS  Goal: Patient will perform bed mobility minimal assist  level of assistance and bed rails in order to improve safety and independence with mobility  Outcome: Progressing  Goal: Patient will complete functional transfer to sit<>stand, toilet, chair with least restrictive device with minimal assist  level of assistance.  Outcome: Progressing     Problem: BALANCE  Goal: Patientt will maintain static standing balance during ADL task with minimal assist  level of assistance drop down in order to demonstrate decreased risk of falling and improved postural control.  Outcome: Progressing

## 2023-10-05 NOTE — PROGRESS NOTES
"Bisi Mckay is a 63 y.o. female on day 4 of admission presenting with Muscle weakness.    Subjective   Patient feeling tired and weak this morning. Denies any difficulty swallowing saliva, SOB, or increased work of breathing.     Persistently tachycardic overnight.          Objective     Physical Exam  Vitals reviewed.   Constitutional:       Comments: Laying in bed, appears tired   HENT:      Head: Normocephalic and atraumatic.      Mouth/Throat:      Mouth: Mucous membranes are moist.   Eyes:      Extraocular Movements: Extraocular movements intact.      Comments: No nystagmus noted on exam, pupils equal    Cardiovascular:      Rate and Rhythm: Tachycardia present.      Pulses: Normal pulses.      Heart sounds: Normal heart sounds.   Pulmonary:      Effort: Pulmonary effort is normal.      Breath sounds: Normal breath sounds. No stridor. No wheezing, rhonchi or rales.   Abdominal:      General: Abdomen is flat.      Palpations: Abdomen is soft.      Tenderness: There is no abdominal tenderness.   Musculoskeletal:         General: No swelling or deformity.      Cervical back: Normal range of motion.      Comments: No joint swelling noted. Bilateral LE swelling in feet. No point tenderness   Skin:     General: Skin is warm.      Findings: Bruising present. No rash.   Neurological:      Mental Status: She is alert and oriented to person, place, and time.      Cranial Nerves: Cranial nerves 2-12 are intact. No dysarthria or facial asymmetry.      Sensory: No sensory deficit.      Motor: Weakness present. No tremor.      Comments: Sensation intact diffusely. No parasthesias. Able to hold arms out against gravity. Hip flexors 2/5.       Last Recorded Vitals  Blood pressure 158/81, pulse 109, temperature 36.4 °C (97.5 °F), resp. rate 22, height 1.651 m (5' 5\"), weight 109 kg (240 lb 8.4 oz), SpO2 96 %.  Intake/Output last 3 Shifts:  I/O last 3 completed shifts:  In: 1735 (15.9 mL/kg) [NG/GT:735; IV " Piggyback:1000]  Out: 1291 (11.8 mL/kg) [Urine:1290 (0.3 mL/kg/hr); Stool:1]  Weight: 109.1 kg     Relevant Results    Current Facility-Administered Medications:     dextrose 10 % infusion, 0.3 g/kg/hr, intravenous, Once PRN, Alysa Castro MD, Stopped at 10/02/23 0700    dextrose 50 % injection 25 g, 25 g, intravenous, q15 min PRN, Alysa Castro MD, 25 g at 10/01/23 1630    esomeprazole (NexIUM) suspension 40 mg, 40 mg, nasoduodenal tube, Daily before breakfast, Alysa Castro MD, 40 mg at 10/05/23 0940    flu vacc [id number] (65yr up)-PF (Fluzone High-Dose Quad) syringe 0.7 mL, 0.7 mL, intramuscular, During hospitalization, Alysa Castro MD    gabapentin (Neurontin) solution 300 mg, 300 mg, oral, Daily before breakfast, Alysa Castro MD, 300 mg at 10/04/23 0646    gabapentin (Neurontin) solution 900 mg, 900 mg, oral, Nightly, Alysa Castro MD, 900 mg at 10/04/23 0027    glucagon (Glucagen) injection 1 mg, 1 mg, intramuscular, q15 min PRN, Alysa Castro MD    heparin (porcine) injection 5,000 Units, 5,000 Units, subcutaneous, q8h, Alysa Castro MD, 5,000 Units at 10/05/23 0941    insulin lispro (HumaLOG) injection 0-5 Units, 0-5 Units, subcutaneous, q4h, Regulo Cobos DO, 1 Units at 10/05/23 1439    pneumococcal conjugate (Prevnar 13) 0.5 mL vaccine 0.5 mL, 0.5 mL, intramuscular, During hospitalization, Alysa Castro MD    sennosides-docusate sodium (Ramila-Colace) 8.6-50 mg per tablet 1 tablet, 1 tablet, oral, Nightly, Alysa Castro MD, 1 tablet at 10/02/23 2013     Results for orders placed or performed during the hospital encounter of 10/01/23 (from the past 24 hour(s))   POCT GLUCOSE   Result Value Ref Range    POCT Glucose 154 (H) 74 - 99 mg/dL   Myoglobin, Urine   Result Value Ref Range    Myoglobin, Urine POSITIVE (A) NEGATIVE   POCT GLUCOSE   Result Value Ref Range    POCT Glucose 189 (H) 74 - 99 mg/dL   POCT GLUCOSE   Result Value  Ref Range    POCT Glucose 170 (H) 74 - 99 mg/dL   Vitamin D 25-Hydroxy,Total (for eval of Vitamin D levels)   Result Value Ref Range    Vitamin D, 25-Hydroxy, Total 57 30 - 100 ng/mL   CBC and Auto Differential   Result Value Ref Range    WBC 8.8 4.4 - 11.3 x10*3/uL    nRBC 0.0 0.0 - 0.0 /100 WBCs    RBC 3.17 (L) 4.00 - 5.20 x10*6/uL    Hemoglobin 9.5 (L) 12.0 - 16.0 g/dL    Hematocrit 31.3 (L) 36.0 - 46.0 %    MCV 99 80 - 100 fL    MCH 30.0 26.0 - 34.0 pg    MCHC 30.4 (L) 32.0 - 36.0 g/dL    RDW 14.2 11.5 - 14.5 %    Platelets 386 150 - 450 x10*3/uL    MPV 8.8 7.5 - 11.5 fL    Neutrophils % 71.1 40.0 - 80.0 %    Immature Granulocytes %, Automated 4.4 (H) 0.0 - 0.9 %    Lymphocytes % 18.1 13.0 - 44.0 %    Monocytes % 5.0 2.0 - 10.0 %    Eosinophils % 0.8 0.0 - 6.0 %    Basophils % 0.6 0.0 - 2.0 %    Neutrophils Absolute 6.23 1.20 - 7.70 x10*3/uL    Immature Granulocytes Absolute, Automated 0.39 0.00 - 0.70 x10*3/uL    Lymphocytes Absolute 1.59 1.20 - 4.80 x10*3/uL    Monocytes Absolute 0.44 0.10 - 1.00 x10*3/uL    Eosinophils Absolute 0.07 0.00 - 0.70 x10*3/uL    Basophils Absolute 0.05 0.00 - 0.10 x10*3/uL   Creatine Kinase   Result Value Ref Range    Creatine Kinase 1,770 (H) 0 - 215 U/L   Magnesium   Result Value Ref Range    Magnesium 1.89 1.60 - 2.40 mg/dL   Comprehensive metabolic panel   Result Value Ref Range    Glucose 199 (H) 74 - 99 mg/dL    Sodium 148 (H) 136 - 145 mmol/L    Potassium 3.8 3.5 - 5.3 mmol/L    Chloride 112 (H) 98 - 107 mmol/L    Bicarbonate 27 21 - 32 mmol/L    Anion Gap 13 10 - 20 mmol/L    Urea Nitrogen 26 (H) 6 - 23 mg/dL    Creatinine 0.81 0.50 - 1.05 mg/dL    eGFR 81 >60 mL/min/1.73m*2    Calcium 7.8 (L) 8.6 - 10.6 mg/dL    Albumin 2.1 (L) 3.4 - 5.0 g/dL    Alkaline Phosphatase 82 33 - 136 U/L    Total Protein 4.3 (L) 6.4 - 8.2 g/dL     (H) 9 - 39 U/L    Bilirubin, Total 0.5 0.0 - 1.2 mg/dL     (H) 7 - 45 U/L   Phosphorus   Result Value Ref Range    Phosphorus 3.6 2.5 -  4.9 mg/dL   Troponin I, High Sensitivity   Result Value Ref Range    Troponin I, High Sensitivity 95 (H) 0 - 34 ng/L   POCT GLUCOSE   Result Value Ref Range    POCT Glucose 194 (H) 74 - 99 mg/dL   POCT GLUCOSE   Result Value Ref Range    POCT Glucose 176 (H) 74 - 99 mg/dL             Assessment/Plan   Principal Problem:    Muscle weakness  Active Problems:    Acute hypoxic respiratory failure (CMS/HCC)    Diabetes mellitus, type 2 (CMS/HCC)    62 yo F w/ PMHx primary biliary cirrhosis, HTN, GERD, DM, hx of breast cancer s/p lumpectomy and radiation, and osteoarthritis presented to [place] on 9/25/23 for weakness and falls [facility] for further workup of acute hypoxic respiratory failure suspected s/t muscular weakness and rheumatologic/neurologic work-up.    Patient reports she first started noticing weakness following a trip to [place]. On the 23rd of September she had a fall and later on the 25th she was found down after hours of being on the ground next to her bed, without losing consciousness.   She was admitted to medicine for rhabdo (Cr 1.75, CK 2880). Hospital course c/b UTI, worsening SKIP 2/2 rhabdo (CK peaked at 11.773). On the 28th developed respiratory failure and got intubated and transferred to the ICU. Patient extubated 10/3. Transferred to floor 10/4 for further work-up of weakness.     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 Patient can have inflammatory myositis with only minimally elevated CK. Also consider GBS given hx or URI and chart reported ascending weakness and paraneoplastic process given breast CA hx. Per neurology, proximal pattern of weakness (deltoids, hip flexors >) localizes to anterior horn cell or below suggestive of NMJ-opathy or myopathy, more likely myopathy given elevated CK. Re-discussed need for MRI spine. No need at this time as symptoms not concerning for myelopathy or radiculopathy. EMG ordered, will  follow-up. Also follow-up pending lab work and continue to trend CK.    #Proximal Muscle Weakness  #AHRF requiring intubation, resolved  #Dysphagia  -ddx: R/o Guillaine [place], Lyme, MG, Polymyositis, MS, NMJ etiology, DM, necrotizing myopathy  -MRI brain 9/10 with minimal chronic microvascular angiopathy. No acute intracranial abnormality.   -CT head and C-spine 9/25 no acute abnormality.   -CXR unremarkable, respiratory panel negative, MRSA nares negative  -Anti-Ach, Lyme 2 step, NORMA, aldolase, ANCA neagative  -Anti-SSA/SSB negative, Anti-Lamar negative  -ESR wnl, CRP 13 (high)  -TSH low, fT4 wnl, anti-TPO ab wnl  -B12: >2000  -C3 132, C4 56 (high), Ceruloplasmin 37 (wnl)  -Cryoglobulin neg   -AM cortisol: wnl   -LDH  >750  -RPR, HIV negative  -CSF viral panel negative, normal cell count and diff, normal protein  -CSF Culture (9/29/23)- no organisms seen  -Blood cultures and fungal cultures negative  -Neurology consulted, appreciate recs  -f/u labs, EMG ordered  -no need for MRI  -Rheumatology consulted, appreciate recs  -follow-up NORMA/GERRY, extended myositis panel, hep C, hep b ag, ab and core total ab, TB-SPOT, vitamin D  -urine myoglobin and repeat UA  -daily trend for CPK, LFT's  -EMG   -swallowing evaluation and consider NIF test   -Pending above workup, will decide on the need for MRI/Muscle biopsy and further workup to determine the etiology.   -SLP consulted, NPO w/ ice chips, aggressive oral hygeine   -plan for MBS tomorrow  -Dobhoff placed for TF  -nutrition consulted    #Tachycardia  -HR 110s-120s starting 10/4, normotensive-hypertensive  -ECG NSR  -s/p 1.5L w/o resolution  -complete TTE with IVC eval ordered to eval volume status    #SKIP, resolving  #Rhabdomyolysis   #HTN  -etiology of SKIP likely ATN s/t rhabdo  -CK elevated, 2k on admission -> peaked 11,773 (9/30) -> now 1.7K  -downtrended with IV fluids  -Baseline Cr 0.7, Cr 1.75 on admission -> 2.5 -> 0.9 now  -Renal US 9/27 only significant for  "borderline increased cortical echogenicity  -UA (9/29): large blood, 3-5 RBCs, +protein, trace leuk est, 1+ bacteria, +casts  -hold home BP meds  -daily CK  -daily RFP    #Urinary Frequency  -patient has bladder stimulating device placed in 2021 for \"frequent urination\"  -rae removed in MICU  -PVR this afternoon 490ml, straight cath x1 -> recheck in evening, may need to replace rae  -device has \"M-mode\" that is MRI safe    #UTI, resolved  -reportedly completed course CTX starting 9/26, not seen in EMR    #T2DM  -hold home metformin  -hypoglycemia protocol  -POCT glucose q4    F: prn  E: replete prn  N: TFs via DH  GI ppx: PPI  DVT: SQH  Access: PIVs    Dispo: pending further work-up    Code status: full code (confirmed on floor transfer)           Lesly Parra MD      "

## 2023-10-05 NOTE — PROGRESS NOTES
"Bisi Mckay is a 63 y.o. female on day 4 of admission presenting with Muscle weakness.    Subjective   Patient stable overall with no new complaints.        Objective     Physical Exam  Significant proximal upper and lower symmetric weakness  Preserved distal motor power   No new skin rashes    Last Recorded Vitals  Blood pressure 158/81, pulse 109, temperature 36.4 °C (97.5 °F), resp. rate 22, height 1.651 m (5' 5\"), weight 109 kg (240 lb 8.4 oz), SpO2 96 %.  Intake/Output last 3 Shifts:  I/O last 3 completed shifts:  In: 1735 (15.9 mL/kg) [NG/GT:735; IV Piggyback:1000]  Out: 1291 (11.8 mL/kg) [Urine:1290 (0.3 mL/kg/hr); Stool:1]  Weight: 109.1 kg                   Assessment/Plan   Bisi Mckay is a 63 y.o. female with PMHx of primary biliary cirrhosis, HTN, GERD, DM, breast cancer, and osteoarthritis, who initially presented to [facility] on 9/25 for weakness and fall. She was then transferred to  MICU for acute hypoxic respiratory failure and rheumatologic/neuromuscular consult.  Patient was noted to have bilateral symmetric proximal weakness involving the deltoid and hip flexors, with dysphagia, and later developed acute hypoxic respiratory failure. No evident ILD changes on xray( no CT chest), no inflammatory arthritis, rashes or Raynaud's.  Her CPK on admission 2K, peaked at 11K and then down trended now to 1.7K with IV hydration however has stabilized today at 1.7K.       Urine tox unrevealing  CPK down to 1.7K and still at 1.7K today  CCP, RF negative  NORMA negative  Myoglobin in the urine +       Impression:  Currently being evaluated by neurology for NMJ etiologies.  Also on the differential is inflammatory myopathy (DM,PM,IBM, necrotizing myopathies),not on statin hence less likely statin induced myopathy, less likely toxin/drug induced, infectious etiology is a possibility given URI sxs around the event however infectious workup has been unrevealing so far.  Less likely metabolic myopathy given " this is her first event. Association between PBC and PM?     Updates:  CPK today stable at 1.7 K pending trend tomorrow. EMG still pending. Extended myositis panel pending.   We discussed with the patient the possibility that her weakness might be secondary to an autoimmune myopathy. We discussed the utility of an EMG in guiding us with the diagnosis and that if we find evidence of myopathy on EMG, we will proceed with starting IVIG given proven benefit in autoimmune myositis and low risk of side effects. ( Mostly risk of  DVT and volume overload). Patient was agreeable with starting this medication if needed.  We also explained that if the diagnosis is confirmed ( further muscle biopsy/ serology) she will need to be started on Immunosuppressive therapy.      Plan:  -Pending extended myositis panel, hep C, hep b ag, ab and core total ab, TB-SPOT, vitamin D  -daily trend for CPK, LFT's  -EMG   -swallowing evaluation  Pending above workup, will decide on the need for MRI/Muscle biopsy and further workup to determine the etiology.               Marsha Becerra MD  Rheumatology Fellow,PGY-V    Patient seen with Dr. Lezama    Rheumatology Attending    I interviewed and examined the patient and discussed the treatment plan. See above note for details.  I agree with the findings and plan of care summarized in the above note.       Shar Lezama MD

## 2023-10-06 ENCOUNTER — APPOINTMENT (OUTPATIENT)
Dept: RADIOLOGY | Facility: HOSPITAL | Age: 63
DRG: 982 | End: 2023-10-06
Payer: MEDICARE

## 2023-10-06 ENCOUNTER — HOSPITAL ENCOUNTER (INPATIENT)
Dept: NEUROLOGY | Facility: HOSPITAL | Age: 63
Discharge: HOME | DRG: 982 | End: 2023-10-06
Payer: MEDICARE

## 2023-10-06 ENCOUNTER — APPOINTMENT (OUTPATIENT)
Dept: CARDIOLOGY | Facility: HOSPITAL | Age: 63
DRG: 982 | End: 2023-10-06
Payer: MEDICARE

## 2023-10-06 LAB
ALBUMIN SERPL BCP-MCNC: 2.3 G/DL (ref 3.4–5)
ALP SERPL-CCNC: 87 U/L (ref 33–136)
ALT SERPL W P-5'-P-CCNC: 117 U/L (ref 7–45)
ANCA AB PATTERN SER IF-IMP: NORMAL
ANCA IGG TITR SER IF: NORMAL {TITER}
ANION GAP SERPL CALC-SCNC: 12 MMOL/L (ref 10–20)
AST SERPL W P-5'-P-CCNC: 109 U/L (ref 9–39)
BILIRUB SERPL-MCNC: 0.5 MG/DL (ref 0–1.2)
BUN SERPL-MCNC: 23 MG/DL (ref 6–23)
CALCIUM SERPL-MCNC: 8.3 MG/DL (ref 8.6–10.6)
CHLORIDE SERPL-SCNC: 110 MMOL/L (ref 98–107)
CK SERPL-CCNC: 2526 U/L (ref 0–215)
CO2 SERPL-SCNC: 27 MMOL/L (ref 21–32)
CREAT SERPL-MCNC: 0.73 MG/DL (ref 0.5–1.05)
EJECTION FRACTION APICAL 4 CHAMBER: 70.7
ERYTHROCYTE [DISTWIDTH] IN BLOOD BY AUTOMATED COUNT: 13.9 % (ref 11.5–14.5)
GFR SERPL CREATININE-BSD FRML MDRD: >90 ML/MIN/1.73M*2
GLUCOSE BLD MANUAL STRIP-MCNC: 128 MG/DL (ref 74–99)
GLUCOSE BLD MANUAL STRIP-MCNC: 152 MG/DL (ref 74–99)
GLUCOSE BLD MANUAL STRIP-MCNC: 190 MG/DL (ref 74–99)
GLUCOSE BLD MANUAL STRIP-MCNC: 202 MG/DL (ref 74–99)
GLUCOSE SERPL-MCNC: 175 MG/DL (ref 74–99)
HBV CORE AB SER QL: NONREACTIVE
HBV SURFACE AB SER-ACNC: <3.1 MIU/ML
HBV SURFACE AG SERPL QL IA: NONREACTIVE
HCT VFR BLD AUTO: 34.6 % (ref 36–46)
HCV AB SER QL: NONREACTIVE
HGB BLD-MCNC: 10.4 G/DL (ref 12–16)
MCH RBC QN AUTO: 29.1 PG (ref 26–34)
MCHC RBC AUTO-ENTMCNC: 30.1 G/DL (ref 32–36)
MCV RBC AUTO: 97 FL (ref 80–100)
MYELOPEROXIDASE AB SER-ACNC: 0 AU/ML (ref 0–19)
NRBC BLD-RTO: 0 /100 WBCS (ref 0–0)
PHOSPHATE SERPL-MCNC: 2.8 MG/DL (ref 2.5–4.9)
PLATELET # BLD AUTO: 393 X10*3/UL (ref 150–450)
PMV BLD AUTO: 8.8 FL (ref 7.5–11.5)
POTASSIUM SERPL-SCNC: 4.3 MMOL/L (ref 3.5–5.3)
PROT SERPL-MCNC: 4.7 G/DL (ref 6.4–8.2)
PROTEINASE3 AB SER-ACNC: 0 AU/ML (ref 0–19)
RBC # BLD AUTO: 3.58 X10*6/UL (ref 4–5.2)
SODIUM SERPL-SCNC: 145 MMOL/L (ref 136–145)
WBC # BLD AUTO: 8.9 X10*3/UL (ref 4.4–11.3)

## 2023-10-06 PROCEDURE — 2500000004 HC RX 250 GENERAL PHARMACY W/ HCPCS (ALT 636 FOR OP/ED): Mod: JZ | Performed by: STUDENT IN AN ORGANIZED HEALTH CARE EDUCATION/TRAINING PROGRAM

## 2023-10-06 PROCEDURE — 82550 ASSAY OF CK (CPK): CPT

## 2023-10-06 PROCEDURE — 2500000004 HC RX 250 GENERAL PHARMACY W/ HCPCS (ALT 636 FOR OP/ED)

## 2023-10-06 PROCEDURE — 2500000001 HC RX 250 WO HCPCS SELF ADMINISTERED DRUGS (ALT 637 FOR MEDICARE OP)

## 2023-10-06 PROCEDURE — 85027 COMPLETE CBC AUTOMATED: CPT

## 2023-10-06 PROCEDURE — 82947 ASSAY GLUCOSE BLOOD QUANT: CPT

## 2023-10-06 PROCEDURE — 95908 NRV CNDJ TST 3-4 STUDIES: CPT | Performed by: PSYCHIATRY & NEUROLOGY

## 2023-10-06 PROCEDURE — 30233S1 TRANSFUSION OF NONAUTOLOGOUS GLOBULIN INTO PERIPHERAL VEIN, PERCUTANEOUS APPROACH: ICD-10-PCS

## 2023-10-06 PROCEDURE — 2500000001 HC RX 250 WO HCPCS SELF ADMINISTERED DRUGS (ALT 637 FOR MEDICARE OP): Performed by: INTERNAL MEDICINE

## 2023-10-06 PROCEDURE — 84100 ASSAY OF PHOSPHORUS: CPT

## 2023-10-06 PROCEDURE — 93306 TTE W/DOPPLER COMPLETE: CPT | Performed by: INTERNAL MEDICINE

## 2023-10-06 PROCEDURE — 2500000002 HC RX 250 W HCPCS SELF ADMINISTERED DRUGS (ALT 637 FOR MEDICARE OP, ALT 636 FOR OP/ED)

## 2023-10-06 PROCEDURE — 74230 X-RAY XM SWLNG FUNCJ C+: CPT

## 2023-10-06 PROCEDURE — 99233 SBSQ HOSP IP/OBS HIGH 50: CPT | Performed by: INTERNAL MEDICINE

## 2023-10-06 PROCEDURE — 1100000001 HC PRIVATE ROOM DAILY

## 2023-10-06 PROCEDURE — 80053 COMPREHEN METABOLIC PANEL: CPT

## 2023-10-06 PROCEDURE — 96372 THER/PROPH/DIAG INJ SC/IM: CPT

## 2023-10-06 PROCEDURE — 97530 THERAPEUTIC ACTIVITIES: CPT | Mod: GP | Performed by: PHYSICAL THERAPIST

## 2023-10-06 PROCEDURE — 95885 MUSC TST DONE W/NERV TST LIM: CPT | Performed by: PSYCHIATRY & NEUROLOGY

## 2023-10-06 PROCEDURE — 93306 TTE W/DOPPLER COMPLETE: CPT

## 2023-10-06 PROCEDURE — 36415 COLL VENOUS BLD VENIPUNCTURE: CPT

## 2023-10-06 PROCEDURE — 92611 MOTION FLUOROSCOPY/SWALLOW: CPT | Mod: GN | Performed by: SPEECH-LANGUAGE PATHOLOGIST

## 2023-10-06 RX ORDER — CARVEDILOL 12.5 MG/1
6.25 TABLET ORAL 2 TIMES DAILY
Status: DISCONTINUED | OUTPATIENT
Start: 2023-10-06 | End: 2023-10-07

## 2023-10-06 RX ORDER — POTASSIUM CHLORIDE 1.5 G/1.58G
20 POWDER, FOR SOLUTION ORAL ONCE
Status: COMPLETED | OUTPATIENT
Start: 2023-10-06 | End: 2023-10-06

## 2023-10-06 RX ORDER — MAGNESIUM SULFATE HEPTAHYDRATE 40 MG/ML
2 INJECTION, SOLUTION INTRAVENOUS ONCE
Status: COMPLETED | OUTPATIENT
Start: 2023-10-06 | End: 2023-10-06

## 2023-10-06 RX ORDER — INSULIN LISPRO 100 [IU]/ML
0-15 INJECTION, SOLUTION INTRAVENOUS; SUBCUTANEOUS
Status: DISCONTINUED | OUTPATIENT
Start: 2023-10-06 | End: 2023-10-09

## 2023-10-06 RX ADMIN — BARIUM SULFATE 20 ML: 400 SUSPENSION ORAL at 11:29

## 2023-10-06 RX ADMIN — ESOMEPRAZOLE MAGNESIUM 40 MG: 40 FOR SUSPENSION ORAL at 12:33

## 2023-10-06 RX ADMIN — METHYLPREDNISOLONE SODIUM SUCCINATE 60 MG: 40 INJECTION, POWDER, FOR SOLUTION INTRAMUSCULAR; INTRAVENOUS at 23:16

## 2023-10-06 RX ADMIN — MAGNESIUM SULFATE HEPTAHYDRATE 2 G: 40 INJECTION, SOLUTION INTRAVENOUS at 21:22

## 2023-10-06 RX ADMIN — GABAPENTIN 300 MG: 250 SOLUTION ORAL at 12:33

## 2023-10-06 RX ADMIN — HEPARIN SODIUM 5000 UNITS: 5000 INJECTION INTRAVENOUS; SUBCUTANEOUS at 12:33

## 2023-10-06 RX ADMIN — IMMUNE GLOBULIN INFUSION (HUMAN) 160 G: 100 INJECTION, SOLUTION INTRAVENOUS; SUBCUTANEOUS at 23:16

## 2023-10-06 RX ADMIN — BARIUM SULFATE 20 ML: 400 PASTE ORAL at 11:29

## 2023-10-06 RX ADMIN — CARVEDILOL 6.25 MG: 12.5 TABLET, FILM COATED ORAL at 21:23

## 2023-10-06 RX ADMIN — INSULIN LISPRO 2 UNITS: 100 INJECTION, SOLUTION INTRAVENOUS; SUBCUTANEOUS at 12:34

## 2023-10-06 RX ADMIN — BARIUM SULFATE 20 ML: 0.81 POWDER, FOR SUSPENSION ORAL at 11:30

## 2023-10-06 RX ADMIN — HEPARIN SODIUM 5000 UNITS: 5000 INJECTION INTRAVENOUS; SUBCUTANEOUS at 01:35

## 2023-10-06 RX ADMIN — HEPARIN SODIUM 5000 UNITS: 5000 INJECTION INTRAVENOUS; SUBCUTANEOUS at 21:23

## 2023-10-06 RX ADMIN — INSULIN LISPRO 3 UNITS: 100 INJECTION, SOLUTION INTRAVENOUS; SUBCUTANEOUS at 18:52

## 2023-10-06 RX ADMIN — POTASSIUM CHLORIDE 20 MEQ: 1.5 POWDER, FOR SOLUTION ORAL at 12:33

## 2023-10-06 RX ADMIN — PERFLUTREN 0.5 ML: 6.52 INJECTION, SUSPENSION INTRAVENOUS at 09:27

## 2023-10-06 RX ADMIN — GABAPENTIN 900 MG: 250 SOLUTION ORAL at 21:22

## 2023-10-06 ASSESSMENT — PAIN SCALES - GENERAL
PAINLEVEL_OUTOF10: 0 - NO PAIN
PAINLEVEL_OUTOF10: 0 - NO PAIN

## 2023-10-06 ASSESSMENT — COGNITIVE AND FUNCTIONAL STATUS - GENERAL
WALKING IN HOSPITAL ROOM: A LOT
STANDING UP FROM CHAIR USING ARMS: A LOT
CLIMB 3 TO 5 STEPS WITH RAILING: TOTAL
MOBILITY SCORE: 11
MOVING TO AND FROM BED TO CHAIR: A LOT
TURNING FROM BACK TO SIDE WHILE IN FLAT BAD: A LOT
MOVING FROM LYING ON BACK TO SITTING ON SIDE OF FLAT BED WITH BEDRAILS: A LOT

## 2023-10-06 NOTE — PROGRESS NOTES
Speech-Language Pathology    SLP Inpatient MBS/FEES Evaluation    Patient Name: Bisi Mckay  MRN: [id number]  Today's Date: 10/6/2023   Time Calculation  Start Time: 1120  Stop Time: 1140  Time Calculation (min): 20 min         Current Problem:   62 yo F w/ PMHx primary biliary cirrhosis, HTN, GERD, DM, hx of breast cancer s/p lumpectomy and radiation, and osteoarthritis presented to [place] on 9/25/23 for weakness and falls transferred to [facility] 10/1/23 for further workup of acute hypoxic respiratory failure suspected s/t muscular weakness and rheumatologic/neurologic work-up.       Recommendations/Treatment:  - Recommended Minced/moist solids, moderately thick liquids; meds crushed and 1:1 supervision with her meals.   - Strongly urge to hold diet if changes in neuro or respiratory status.   - Consider maintaining enteral feeding access to meet nutrition goals particularly with modified diet and extremity weakness   Solid Consistency: Minced & moist/ground (IDDSI Level 5)  Liquid Consistency: Honey thick/moderately thick (IDDS Level 3)  Medication Administration: PO  Supervision: 1:1      Assessment/Impression:  -  Mild-moderate oropharyngeal dysphagia characterized by decreased sensory response to elicit pharyngeal swallow phase. Consequently mild volume of thin liquid aspiration with an impaired detection of material in the airway. Additionally, there is decreased tongue base retraction and posterior pharyngeal stripping wave implicating motor impairment, resulting mild food residues in the pharynx. Bisi showed toleration of moderately thick consistencies as well as soft and puree textures.     Recommended Minced/moist solids, moderately thick liquids; meds crushed and 1:1 supervision with her meals. Strongly urge to hold diet if changes in neuro or respiratory status. Consider maintaining enteral feeding access to meet nutrition goals particularly with modified diet and extremity weakness.          Plan:  Inpatient/Swing Bed or Outpatient: Inpatient  SLP Plan: Skilled SLP  SLP Frequency: 2x per week  Duration: 2 weeks  SLP Discharge Recommendations:  (TBD)  Diet Recommendations:  (NPO- ice chips (5/hour); aggressive oral care)  Solid Consistency: Minced & moist/ground (IDDSI Level 5)  Liquid Consistency: Honey thick/moderately thick (IDDS Level 3)  Discussed POC: Patient  Discussed Risks/Benefits: Yes  Patient/Caregiver Agreeable: Yes      Treatment Provided:  no       Subjective   Current Problem:  Pt resting in bed. A/O x 4      General Visit Information:  Patient Class: Inpatient  Living Environment: Home  Arrival: Family/caregiver present  Ordering Physician:  (MICU team)  Radiologist: Dr. Jennings  Reason for Referral: inform diet recs; proximal muscle weakness  Patient Seen During This Visit: Yes  Prior Level of Function: WFL  Date of Order: 10/05/23  Date of Evaluation: 10/06/23  Type of Study: Initial MBS  BaseLine Diet: regular  Current Diet : NPO with NGT  Dysphagia Diagnosis:  (MIld-moderate oropharyngeal)        Oral Phase:  Oral Phase: Impaired     Oral Phase - Comment  Oral Phase - Comment: Bolus prep and mastication however, velocity of lingual movements appearing mildly sluggish. Oral residues were characterized by trace coating along tongue and palate surface but no anterior loss or loss of bolus to floor of mouth        Pharyngeal Phase:  Pharyngeal Phase: Impaired     Pharyngeal Phase - Comment  Pharyngeal Comment: Suspected impaired pharyngeal swallow onset, given bolus (thin reaching) the pyriform sinus space for several secondary prior to hyolaryngeal burst. Consequently laryngeal vestibular closure is delayed relative to bolus position. Closure of the laryngeal vestibule space is incomplete evidenced by contrast entering the space during the swallow and reduced hyolaryngeal anterior excursion. With single sips, laryngeal penetration of thin (trace amounts) without ejection.  Increased volume resulted in deep penetration to the vocal folds and paul aspiration of mild volume with rapid sips- chin tuck was not effective. Cough response was inconsistent and not effective to eject when cued. Both nectar and moderately thick provided improved coordination of bolus with laryngeal penetration to the vocal folds (nectar) and no laryngeal penetration/aspiration with moderately thick. The chin tuck was effective with nectar liquids. Both puree and cookie showed adequate airway protection however, there was incremental increase to vallecular stasis (mild-mod). Given lack of complete contact with posterior wall and tongue base, this may implicate weakness in pharyngeal stripping of posterior wall and tongue base propulsion      [place]:  Consistencies/Score: Thin: 8 - Material enters airway, passes below cords, no effort to eject (no cough)  Consistencies/Score: Nectar Liquid: 5 - Material enters airway, contacts cords, not ejected from airway  Consistencies/Score: Honey Liquid: 1 - Material does not enter airway  Consistencies/Score: Pudding/Puree: 1 - Material does not enter airway  Consistencies/Score: Solid: 1 - Material does not enter airway      Esophageal Phase:  Esophageal Phase: Within Functional Limits                 Encounter Problems       Encounter Problems (Active)       Swallowing       LTG - Patient will tolerate the least restrictive diet without overt difficulty by time of discharge. (Progressing)       Start:  10/05/23    Expected End:  10/19/23            STG - Patient will implement safe swallowing strategies to reduce risk of aspiration given caregiver assistance/cueing as needed.  (Progressing)       Start:  10/05/23    Expected End:  10/19/23       progressing         STG - Patient will participate in a Modified Barium Swallow Study. (Met)       Start:  10/05/23    Expected End:  10/19/23    Resolved:  10/06/23    Will schedule for 10/6                  Outpatient  Education:  Adult Outpatient Education  Individual(s) Educated: Patient  Patient/Caregiver Demonstrated Understanding: yes  Plan of Care Discussed and Agreed Upon: yes      Inpatient:  Education Documentation  No documentation found.  Education Comments  No comments found.

## 2023-10-06 NOTE — SIGNIFICANT EVENT
Attempted seeing the patient twice today however she was not in the room (was getting TTE and EMG).  Labs are still pending for today.  Swallowing evaluation suggestive of dysphagia.   EMG resulted with evidence of a myopathy with fibrillation consistent with a necrotizing myopathy.   Extended myositis panel, HMG-co reductase are still pending.     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   Getting a muscle biopsy and the extended myositis panel would identify which type of Myopathy this is and would better guide our immunosuppressive treatment plan.      Plan:  -Please consult general surgery for a proximal muscle biopsy from the opposite side of the EMG ( to avoid having artifacts on biopsy secondary to the EMG needles)   -Please start Solu-medrol 60 mg IV daily   -Please give IVIG 2g/Kg infusion once, given the severity of her presentation, to be redosed in a month from now ( refer to yesterday's note for consent)  -Please repeat CPK today and daily   -Pending Hep B ag, ab, core, hep C and TB-spot  -Pending EMP and HMG-co reductase ab     Case discussed with Dr. 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 Agree with plan to initiate treatment with IVIg and systemic corticosteroids while awaiting the results of the extended myositis panel and testing for antibodies to HMG CoA reductase. If  anti-SRP antibodies are present we will probably add IV rituximab.     Shar Lezama MD

## 2023-10-06 NOTE — PROGRESS NOTES
Physical Therapy    Physical Therapy Treatment    Patient Name: Bisi Mckay  MRN: [id number]  Today's Date: 10/6/2023  Time Calculation  Start Time: 1320  Stop Time: 1337  Time Calculation (min): 17 min       Assessment/Plan   PT Assessment  PT Assessment Results: Decreased strength, Impaired balance, Decreased mobility  Rehab Prognosis: Good  End of Session Communication: Bedside nurse  Assessment Comment: pt continues to be motivated and with improved resutts today with decrease assist for mobility  End of Session Patient Position: Bed, 3 rail up, Alarm off, not on at start of session  PT Plan  Inpatient/Swing Bed or Outpatient: Inpatient  PT Plan  Treatment/Interventions: Bed mobility, Transfer training, Gait training, Balance training, Neuromuscular re-education, Strengthening, Therapeutic exercise, Therapeutic activity, Positioning, Postural re-education  PT Plan: Skilled PT  PT Frequency: 4 times per week  PT Discharge Recommendations: High intensity level of continued care      General Visit Information:   PT  Visit  PT Received On: 10/06/23  General  Reason for Referral: weakness  Family/Caregiver Present: No  Co-Treatment: OT  Co-Treatment Reason: co treated with OT to maximize benefit and safety  Prior to Session Communication: Bedside nurse  Patient Position Received: Bed, 2 rail up  General Comment: Pt pleasant and receptive to therapy. Particpates fully, fatigues quickly with EOB sitting, tolerates x10 minutes. Achieved full stand this date with Depx2.    Subjective   Precautions:  Precautions  Hearing/Visual Limitations: glasses full time  Medical Precautions: Fall precautions      Objective   Pain:  Pain Assessment  Pain Assessment: 0-10  Pain Score: 0 - No pain  Cognition:  Cognition  Overall Cognitive Status: Within Functional Limits  Orientation Level: Oriented X4         Activity Tolerance:  Activity Tolerance  Endurance: Tolerates less than 10 min exercise, no significant change in vital  signs  Treatments:  Therapeutic Exercise  Therapeutic Exercise Performed: Yes  Therapeutic Exercise Activity 1: supine AP 1 x 10 B  Therapeutic Exercise Activity 2: supine HS 1 x 10 B  Therapeutic Exercise Activity 3: supine Hip abd/add 1 x 10 B         Therapeutic Activity  Therapeutic Activity Performed: Yes  Therapeutic Activity 1: pt sat EOB for 5 mins performing dyanmic reaching in all planes with min assist                   Bed Mobility  Bed Mobility: Yes (for hygiene and pericare)  Bed Mobility 1  Bed Mobility 1: Supine to sitting  Level of Assistance 1: Moderate assistance, Minimal verbal cues  Bed Mobility Comments 1:  (use of draw sheet, HOB raised and bed rail)  Bed Mobility 2  Bed Mobility  2: Sitting to supine  Level of Assistance 2: Moderate assistance  Bed Mobility 3  Bed Mobility 3: Rolling right, Rolling left  Level of Assistance 3: Moderate assistance  Bed Mobility 4  Bed Mobility 4: Scooting  Level of Assistance 4: Maximum assistance (X@)       Transfers  Transfer: Yes  Transfer 1  Transfer From 1: Sit to  Transfer to 1: Stand  Transfer Device 1:  (arm in arm)  Transfer Level of Assistance 1: Moderate assistance (blocking at B LE)  Trials/Comments 1: pt cued to stand tall, squeeze gluts and push through LE    Outcome Measures:  [facility] Basic Mobility  Turning from your back to your side while in a flat bed without using bedrails: A lot  Moving from lying on your back to sitting on the side of a flat bed without using bedrails: A lot  Moving to and from bed to chair (including a wheelchair): A lot  Standing up from a chair using your arms (e.g. wheelchair or bedside chair): A lot  To walk in hospital room: A lot  Climbing 3-5 steps with railing: Total  Basic Mobility - Total Score: 11    Education Documentation  Body Mechanics, taught by Etta Drake, PT at 10/6/2023  1:47 PM.  Learner: Patient  Readiness: Eager  Method: Demonstration  Response: Demonstrated Understanding    Precautions,  taught by Etta Drake PT at 10/6/2023  1:47 PM.  Learner: Patient  Readiness: Eager  Method: Demonstration  Response: Demonstrated Understanding    ADL Training, taught by Etta Drake PT at 10/6/2023  1:47 PM.  Learner: Patient  Readiness: Eager  Method: Demonstration  Response: Demonstrated Understanding    Body Mechanics, taught by Etta Drake PT at 10/6/2023  1:47 PM.  Learner: Patient  Readiness: Eager  Method: Demonstration  Response: Demonstrated Understanding    Mobility Training, taught by Etta Drake PT at 10/6/2023  1:47 PM.  Learner: Patient  Readiness: Eager  Method: Demonstration  Response: Demonstrated Understanding    Education Comments  No comments found.        OP EDUCATION:       Encounter Problems       Encounter Problems (Active)       Balance       patient will complete static (Cgx1) and dynamic (MINx1) sitting balance activities using UE support as needed without acute LOB in order to maintain midline (Progressing)       Start:  10/04/23    Expected End:  10/18/23               Mobility       patient will ambulate >/=3 ft in order to complete functional tranfers without acute LOB with MAXx2 assist.  (Progressing)       Start:  10/04/23    Expected End:  10/18/23               Mobility       patient will participate in BLE there-ex in order to assist in improving strength and to assist with the completion of functional mobility tasks  (Progressing)       Start:  10/04/23    Expected End:  10/18/23               Pain - Adult          Transfers       Patient will complete STS with MODx2 without acute LOB  (Progressing)       Start:  10/04/23    Expected End:  10/18/23            patient will complete supine<->sit with MODx2 with bedrail as needed without acute LOB  (Progressing)       Start:  10/04/23    Expected End:  10/18/23

## 2023-10-06 NOTE — PROGRESS NOTES
"Bisi Mckay is a 63 y.o. female on day 5 of admission presenting with Muscle weakness.    Subjective   Patient feels well this morning. No SOB or choking sensation. No new concerns.         Objective     Physical Exam  Vitals reviewed.   Constitutional:       Comments: Laying in bed, appears to have more energy than previously   HENT:      Head: Normocephalic and atraumatic.      Mouth/Throat:      Mouth: Mucous membranes are moist.      Comments: Patient typically quiet/weak when speaking. Much louder and clearer this morning  Eyes:      Extraocular Movements: Extraocular movements intact.      Comments: No nystagmus noted on exam, pupils equal    Cardiovascular:      Rate and Rhythm: Tachycardia present.      Pulses: Normal pulses.      Heart sounds: Normal heart sounds.   Pulmonary:      Effort: Pulmonary effort is normal.      Breath sounds: Normal breath sounds. No stridor. No wheezing, rhonchi or rales.   Abdominal:      General: Abdomen is flat.      Palpations: Abdomen is soft.      Tenderness: There is no abdominal tenderness.   Musculoskeletal:         General: No swelling or deformity.      Cervical back: Normal range of motion.      Comments: No joint swelling noted. Bilateral LE swelling in feet. No point tenderness   Skin:     General: Skin is warm.      Findings: Bruising present. No rash.   Neurological:      Mental Status: She is alert and oriented to person, place, and time.      Cranial Nerves: Cranial nerves 2-12 are intact. No dysarthria or facial asymmetry.      Sensory: No sensory deficit.      Motor: Weakness present. No tremor.      Comments: Sensation intact diffusely. No parasthesias. Hip flexors 2/5. Able to raise and hold arms above head         Last Recorded Vitals  Blood pressure 136/72, pulse 98, temperature 36.8 °C (98.2 °F), resp. rate 17, height 1.651 m (5' 5\"), weight 112 kg (248 lb), SpO2 95 %.  Intake/Output last 3 Shifts:  I/O last 3 completed shifts:  In: 1915 (17 mL/kg) " [NG/GT:1915]  Out: 2626 (23.3 mL/kg) [Urine:2625 (0.6 mL/kg/hr); Stool:1]  Weight: 112.5 kg     Relevant Results    Current Facility-Administered Medications:     carvedilol (Coreg) tablet 6.25 mg, 6.25 mg, oral, BID, Lesly Parra MD    dextrose 10 % infusion, 0.3 g/kg/hr, intravenous, Once PRN, Alysa Castro MD, Stopped at 10/02/23 0700    dextrose 50 % injection 25 g, 25 g, intravenous, q15 min PRN, Alysa Castro MD, 25 g at 10/01/23 1630    esomeprazole (NexIUM) suspension 40 mg, 40 mg, nasoduodenal tube, Daily before breakfast, Alysa Castro MD, 40 mg at 10/06/23 1233    flu vacc [id number] (65yr up)-PF (Fluzone High-Dose Quad) syringe 0.7 mL, 0.7 mL, intramuscular, During hospitalization, Alysa Castro MD    gabapentin (Neurontin) solution 300 mg, 300 mg, oral, Daily before breakfast, Alysa Castro MD, 300 mg at 10/06/23 1233    gabapentin (Neurontin) solution 900 mg, 900 mg, oral, Nightly, Alysa Castro MD, 900 mg at 10/05/23 2230    glucagon (Glucagen) injection 1 mg, 1 mg, intramuscular, q15 min PRN, Alysa Castro MD    heparin (porcine) injection 5,000 Units, 5,000 Units, subcutaneous, q8h, Alysa Castro MD, 5,000 Units at 10/06/23 1233    insulin lispro (HumaLOG) injection 0-10 Units, 0-10 Units, subcutaneous, TID with meals, Lesly Parra MD, 2 Units at 10/06/23 1234    perflutren protein A microsphere (Optison) injection 0.5 mL, 0.5 mL, intravenous, Once in imaging, Lesly Parra MD    pneumococcal conjugate (Prevnar 13) 0.5 mL vaccine 0.5 mL, 0.5 mL, intramuscular, During hospitalization, Alysa Castro MD    sennosides-docusate sodium (Ramila-Colace) 8.6-50 mg per tablet 1 tablet, 1 tablet, oral, Nightly, Alysa Castro MD, 1 tablet at 10/02/23 2013    sulfur hexafluoride microsphr (Lumason) injection 24.28 mg, 2 mL, intravenous, Once in imaging, Lesly Parra MD     Results for orders placed or performed  during the hospital encounter of 10/01/23 (from the past 24 hour(s))   POCT GLUCOSE   Result Value Ref Range    POCT Glucose 174 (H) 74 - 99 mg/dL   POCT GLUCOSE   Result Value Ref Range    POCT Glucose 202 (H) 74 - 99 mg/dL   Transthoracic Echo (TTE) Complete   Result Value Ref Range    LV A4C EF 70.7    POCT GLUCOSE   Result Value Ref Range    POCT Glucose 190 (H) 74 - 99 mg/dL             Assessment/Plan   Principal Problem:    Muscle weakness  Active Problems:    Acute hypoxic respiratory failure (CMS/HCC)    Diabetes mellitus, type 2 (CMS/HCC)    64 yo F w/ PMHx primary biliary cirrhosis, HTN, GERD, DM, hx of breast cancer s/p lumpectomy and radiation, and osteoarthritis presented to [facility] on 9/25/23 for weakness and falls [facility] for further workup of acute hypoxic respiratory failure suspected s/t muscular weakness and rheumatologic/neurologic work-up.    Patient reports she first started noticing weakness following a trip to [place]. On the 23rd of September she had a fall and later on the 25th she was found down after hours of being on the ground next to her bed, without losing consciousness.   She was admitted to medicine for rhabdo (Cr 1.75, CK 2880). Hospital course c/b UTI, worsening SKIP 2/2 rhabdo (CK peaked at 11.773). On the 28th developed respiratory failure and got intubated and transferred to the ICU. Patient extubated 10/3. Transferred to floor 10/4 for further work-up of weakness.     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 Patient can have inflammatory myositis with only minimally elevated CK. Also consider GBS given hx or URI and chart reported ascending weakness and paraneoplastic process given breast CA hx. Per neurology, proximal pattern of weakness (deltoids, hip flexors >) localizes to anterior horn cell or below suggestive of NMJ-opathy or myopathy, more likely myopathy given elevated CK. Re-discussed need for  MRI spine. No need at this time as symptoms not concerning for myelopathy or radiculopathy. EMG ordered, will follow-up. Also follow-up pending lab work and continue to trend CK.    Updates 10/6:  -FWFs increased to 500 q6h yesterday, Na148 free water deficit almost 3L  -rae placed yesterday for rentention  -Patient's strength in UE and energy level seems to be much better today, able to speak more loudly and clearly  -MBS completed, will attempt PO and keep NG for FWFs  -EMG and TTE completed today  -SBPs mostly 150-160s, added coreg 6.25 mg BID  -NIF ordered    #Proximal Muscle Weakness  #AHRF requiring intubation, resolved  #Dysphagia  -ddx: R/o Guillaine Pennington, Lyme, MG, Polymyositis, MS, NMJ etiology, DM, necrotizing myopathy  -MRI brain 9/10 with minimal chronic microvascular angiopathy. No acute intracranial abnormality.   -CT head and C-spine 9/25 no acute abnormality.   -CXR unremarkable, respiratory panel negative, MRSA nares negative  -Anti-Ach, Lyme 2 step, NORMA, aldolase, ANCA, AMA, RF, anti-CCP negative  -Anti-SSA/SSB negative, Anti-Lamar negative  -ESR wnl, CRP 13 (high)  -TSH low, fT4 wnl, anti-TPO ab wnl  -B12: >2000  -C3 132, C4 56 (high), Ceruloplasmin 37 (wnl)  -Cryoglobulin neg   -AM cortisol: wnl   -LDH  >750  -RPR, HIV negative  -CSF viral panel negative, normal cell count and diff, normal protein  -CSF Culture (9/29/23)- no organisms seen  -Blood cultures and fungal cultures negative  -Pending: Anti-Ach, Lyme Ab, MuSK, ANCA, extended myositis panel, aldolase, HMG-CoAR Ab, vit D, T-spot, peripheral smear, HepB core AB, HepB surface AB and antigen, Hep C Ab  -Neurology consulted, appreciate recs  -f/u labs, EMG completed, f/u results  -no need for MRI  -Rheumatology consulted, appreciate recs  -follow-up NORMA/GERRY, extended myositis panel, hep C, hep b ag, ab and core total ab, TB-SPOT, vitamin D  -urine myoglobin positive  -daily trend for CPK, LFT's  -EMG completed, f/u  -MRI/Muscle biopsy need  "pending above  -SLP consulted, MBS completed 10/6   -minced, moist diet and mod thickened liquid  -Dobhoff keep DH for FWFs  -NIF ordered    #Tachycardia  -HR 110s-120s starting 10/4, normotensive-hypertensive  -ECG NSR  -s/p 1.5L w/o resolution  -complete TTE with IVC eval ordered to eval volume status    #SKIP, resolving  #Rhabdomyolysis   #HTN  -etiology of SKIP likely ATN s/t rhabdo  -CK elevated, 2k on admission -> peaked 11,773 (9/30) -> now 1.7K  -downtrended with IV fluids  -Baseline Cr 0.7, Cr 1.75 on admission -> 2.5 -> 0.9 now  -Renal US 9/27 only significant for borderline increased cortical echogenicity  -UA (9/29): large blood, 3-5 RBCs, +protein, trace leuk est, 1+ bacteria, +casts  -urine myoglobin +   -started coreg 6.25 BID 10/6  -rae placed 10/6  -daily CK  -daily RFP    #Urinary Frequency  -patient has bladder stimulating device placed in 2021 for \"frequent urination\"  -rae removed in MICU  -PVR this afternoon 490ml, straight cath x1 -> recheck in evening, may need to replace rae  -device has \"M-mode\" that is MRI safe    #UTI, resolved  -reportedly completed course CTX starting 9/26, not seen in EMR    #T2DM  -hold home metformin  -mod SSI  -hypoglycemia protocol  -POCT glucose q4    F: prn  E: replete prn  N: moist, minced diet with mod thickened liquids  GI ppx: PPI  DVT: SQH  Access: PIVs    Dispo: pending further work-up    Code status: full code (confirmed on floor transfer)           Lesly Parra MD      "

## 2023-10-07 LAB
ALBUMIN SERPL BCP-MCNC: 2.5 G/DL (ref 3.4–5)
ALP SERPL-CCNC: 91 U/L (ref 33–136)
ALT SERPL W P-5'-P-CCNC: 110 U/L (ref 7–45)
ANION GAP SERPL CALC-SCNC: 15 MMOL/L (ref 10–20)
AST SERPL W P-5'-P-CCNC: 88 U/L (ref 9–39)
BILIRUB SERPL-MCNC: 0.5 MG/DL (ref 0–1.2)
BUN SERPL-MCNC: 27 MG/DL (ref 6–23)
CALCIUM SERPL-MCNC: 8.4 MG/DL (ref 8.6–10.6)
CHLORIDE SERPL-SCNC: 102 MMOL/L (ref 98–107)
CK SERPL-CCNC: 1495 U/L (ref 0–215)
CO2 SERPL-SCNC: 26 MMOL/L (ref 21–32)
CREAT SERPL-MCNC: 0.82 MG/DL (ref 0.5–1.05)
ERYTHROCYTE [DISTWIDTH] IN BLOOD BY AUTOMATED COUNT: 13.2 % (ref 11.5–14.5)
GFR SERPL CREATININE-BSD FRML MDRD: 79 ML/MIN/1.73M*2
GLUCOSE BLD MANUAL STRIP-MCNC: 168 MG/DL (ref 74–99)
GLUCOSE BLD MANUAL STRIP-MCNC: 209 MG/DL (ref 74–99)
GLUCOSE BLD MANUAL STRIP-MCNC: 242 MG/DL (ref 74–99)
GLUCOSE BLD MANUAL STRIP-MCNC: 257 MG/DL (ref 74–99)
GLUCOSE SERPL-MCNC: 278 MG/DL (ref 74–99)
HCT VFR BLD AUTO: 33.9 % (ref 36–46)
HGB BLD-MCNC: 10.9 G/DL (ref 12–16)
MCH RBC QN AUTO: 29.7 PG (ref 26–34)
MCHC RBC AUTO-ENTMCNC: 32.2 G/DL (ref 32–36)
MCV RBC AUTO: 92 FL (ref 80–100)
NRBC BLD-RTO: 0 /100 WBCS (ref 0–0)
PLATELET # BLD AUTO: 364 X10*3/UL (ref 150–450)
PMV BLD AUTO: 9.2 FL (ref 7.5–11.5)
POTASSIUM SERPL-SCNC: 4.6 MMOL/L (ref 3.5–5.3)
PROT SERPL-MCNC: 5.4 G/DL (ref 6.4–8.2)
RBC # BLD AUTO: 3.67 X10*6/UL (ref 4–5.2)
SODIUM SERPL-SCNC: 138 MMOL/L (ref 136–145)
WBC # BLD AUTO: 8.8 X10*3/UL (ref 4.4–11.3)

## 2023-10-07 PROCEDURE — 2500000004 HC RX 250 GENERAL PHARMACY W/ HCPCS (ALT 636 FOR OP/ED)

## 2023-10-07 PROCEDURE — 2500000001 HC RX 250 WO HCPCS SELF ADMINISTERED DRUGS (ALT 637 FOR MEDICARE OP): Performed by: STUDENT IN AN ORGANIZED HEALTH CARE EDUCATION/TRAINING PROGRAM

## 2023-10-07 PROCEDURE — 2500000004 HC RX 250 GENERAL PHARMACY W/ HCPCS (ALT 636 FOR OP/ED): Performed by: STUDENT IN AN ORGANIZED HEALTH CARE EDUCATION/TRAINING PROGRAM

## 2023-10-07 PROCEDURE — 96372 THER/PROPH/DIAG INJ SC/IM: CPT

## 2023-10-07 PROCEDURE — 2500000001 HC RX 250 WO HCPCS SELF ADMINISTERED DRUGS (ALT 637 FOR MEDICARE OP)

## 2023-10-07 PROCEDURE — 2500000002 HC RX 250 W HCPCS SELF ADMINISTERED DRUGS (ALT 637 FOR MEDICARE OP, ALT 636 FOR OP/ED)

## 2023-10-07 PROCEDURE — 82947 ASSAY GLUCOSE BLOOD QUANT: CPT

## 2023-10-07 PROCEDURE — 85027 COMPLETE CBC AUTOMATED: CPT

## 2023-10-07 PROCEDURE — 36415 COLL VENOUS BLD VENIPUNCTURE: CPT

## 2023-10-07 PROCEDURE — 51702 INSERT TEMP BLADDER CATH: CPT

## 2023-10-07 PROCEDURE — 83516 IMMUNOASSAY NONANTIBODY: CPT

## 2023-10-07 PROCEDURE — 1100000001 HC PRIVATE ROOM DAILY

## 2023-10-07 PROCEDURE — 80053 COMPREHEN METABOLIC PANEL: CPT

## 2023-10-07 PROCEDURE — 72142 MRI NECK SPINE W/DYE: CPT | Performed by: RADIOLOGY

## 2023-10-07 PROCEDURE — 99233 SBSQ HOSP IP/OBS HIGH 50: CPT | Performed by: INTERNAL MEDICINE

## 2023-10-07 PROCEDURE — 82550 ASSAY OF CK (CPK): CPT

## 2023-10-07 RX ORDER — CARVEDILOL 3.12 MG/1
3.12 TABLET ORAL 2 TIMES DAILY
Status: DISCONTINUED | OUTPATIENT
Start: 2023-10-07 | End: 2023-10-14 | Stop reason: HOSPADM

## 2023-10-07 RX ORDER — OXYCODONE HYDROCHLORIDE 5 MG/1
5 TABLET ORAL EVERY 6 HOURS PRN
Status: DISPENSED | OUTPATIENT
Start: 2023-10-07 | End: 2023-10-09

## 2023-10-07 RX ADMIN — GABAPENTIN 900 MG: 250 SOLUTION ORAL at 20:38

## 2023-10-07 RX ADMIN — INSULIN LISPRO 6 UNITS: 100 INJECTION, SOLUTION INTRAVENOUS; SUBCUTANEOUS at 08:26

## 2023-10-07 RX ADMIN — INSULIN LISPRO 9 UNITS: 100 INJECTION, SOLUTION INTRAVENOUS; SUBCUTANEOUS at 12:37

## 2023-10-07 RX ADMIN — METHYLPREDNISOLONE SODIUM SUCCINATE 60 MG: 40 INJECTION, POWDER, FOR SOLUTION INTRAMUSCULAR; INTRAVENOUS at 20:38

## 2023-10-07 RX ADMIN — INSULIN LISPRO 6 UNITS: 100 INJECTION, SOLUTION INTRAVENOUS; SUBCUTANEOUS at 18:44

## 2023-10-07 RX ADMIN — SENNOSIDES AND DOCUSATE SODIUM 1 TABLET: 50; 8.6 TABLET ORAL at 20:40

## 2023-10-07 RX ADMIN — HEPARIN SODIUM 5000 UNITS: 5000 INJECTION INTRAVENOUS; SUBCUTANEOUS at 12:37

## 2023-10-07 RX ADMIN — HEPARIN SODIUM 5000 UNITS: 5000 INJECTION INTRAVENOUS; SUBCUTANEOUS at 20:38

## 2023-10-07 RX ADMIN — HEPARIN SODIUM 5000 UNITS: 5000 INJECTION INTRAVENOUS; SUBCUTANEOUS at 04:30

## 2023-10-07 RX ADMIN — GABAPENTIN 300 MG: 250 SOLUTION ORAL at 08:26

## 2023-10-07 RX ADMIN — ESOMEPRAZOLE MAGNESIUM 40 MG: 40 FOR SUSPENSION ORAL at 08:26

## 2023-10-07 RX ADMIN — CARVEDILOL 6.25 MG: 12.5 TABLET, FILM COATED ORAL at 08:25

## 2023-10-07 RX ADMIN — CARVEDILOL 3.12 MG: 3.12 TABLET, FILM COATED ORAL at 20:39

## 2023-10-07 NOTE — CONSULTS
Reason For Consult  Left sided muscle biopsy for concern for autoimmune myopathy    History Of Present Illness  Bisi Mckay is a 63 y.o. female with hx PBC, HTN, DM, breast CA s/p lumpectomy/XRT, osteoarthritis, who presented to OSH for weakness and falls, subsequently transferred to  MICU for AHRF 2/2 concern for muscular weakness and for workup.    Patient expresses weakness first arose following a trip to [place] about 1 month ago, but notes she has had increased fatigue within the past year or so. She then reports multiple falls since, beginning approximately 3 weeks ago; she was found down next to her bed on 9/25, for which she was admitted for rhabdomyolysis c/b UTI, SKIP. Subsequently intubated on 9/28 for respiratory failure, transferred to , extubated 10/3.    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 Recommended biopsy of left sided muscles from upper or lower extremities, for which we are consulted.     Past Medical History  She has a past medical history of Abnormal liver enzymes, Arthritis, Breast cancer (CMS/HCC), Constipation, Diabetes mellitus, type 2 (CMS/HCC), GERD (gastroesophageal reflux disease), Hypertension, Irritable bowel syndrome, Muscle weakness, and Neuropathy.    Surgical History  She has a past surgical history that includes Carpal tunnel release (N/A, 2016); Abdominal adhesion surgery (09/24/2014); Appendectomy (09/24/2014); Cholecystectomy (11/01/2012); Breast lumpectomy (06/2012); Hysterectomy (04/01/2007); and Bladder suspension (04/01/2007).     Social History  She reports that she has never smoked. She does not have any smokeless tobacco history on file. She reports current alcohol use. She reports that she does not use drugs.    Family History  Family History   Problem Relation Name Age of Onset    Breast cancer Mother      Diabetes Mother      Hypertension Mother       "Hyperlipidemia Mother      Cancer Father      Hypertension Father      Pancreatic cancer Father      Stomach cancer Father      Diabetes Sister      Hypertension Sister      Asthma Son          Allergies  Bactrim [sulfamethoxazole-trimethoprim]    Review of Systems  A 14-point ROS was performed and was unremarkable except as above.     Physical Exam  GEN: No acute distress. Alert, awake and conversive  HEENT: Atraumatic. Sclera anicteric. Moist mucous membranes. NGT in place.  RESP: Breathing non-labored, equal chest rise. On RA.  CV: Regular rate, normotensive  GI: Abdomen soft, nondistended, nontender.   MSK: Moves all extremities spontaneously.  Shoulders 3/5 strength b/l, hips 2/5 b/l. Elbows 4/5 b/l, 4/5 at knees, 5/5 at ankles.  Extremities: No peripheral edema. Appear well perfused.  NEURO: Alert and oriented x3. No focal deficits.  PSYCH: Appropriate mood and affect.  SKIN: No rashes or lesions.     Last Recorded Vitals  Blood pressure 120/65, pulse 107, temperature 36.5 °C (97.7 °F), temperature source Temporal, resp. rate 18, height 1.651 m (5' 5\"), weight 115 kg (252 lb 6.8 oz), SpO2 97 %.    Relevant Results  Recent Results (from the past 18 hour(s))   CBC    Collection Time: 10/07/23  6:46 AM   Result Value Ref Range    WBC 8.8 4.4 - 11.3 x10*3/uL    nRBC 0.0 0.0 - 0.0 /100 WBCs    RBC 3.67 (L) 4.00 - 5.20 x10*6/uL    Hemoglobin 10.9 (L) 12.0 - 16.0 g/dL    Hematocrit 33.9 (L) 36.0 - 46.0 %    MCV 92 80 - 100 fL    MCH 29.7 26.0 - 34.0 pg    MCHC 32.2 32.0 - 36.0 g/dL    RDW 13.2 11.5 - 14.5 %    Platelets 364 150 - 450 x10*3/uL    MPV 9.2 7.5 - 11.5 fL   Comprehensive metabolic panel    Collection Time: 10/07/23  6:46 AM   Result Value Ref Range    Glucose 278 (H) 74 - 99 mg/dL    Sodium 138 136 - 145 mmol/L    Potassium 4.6 3.5 - 5.3 mmol/L    Chloride 102 98 - 107 mmol/L    Bicarbonate 26 21 - 32 mmol/L    Anion Gap 15 10 - 20 mmol/L    Urea Nitrogen 27 (H) 6 - 23 mg/dL    Creatinine 0.82 0.50 - 1.05 " mg/dL    eGFR 79 >60 mL/min/1.73m*2    Calcium 8.4 (L) 8.6 - 10.6 mg/dL    Albumin 2.5 (L) 3.4 - 5.0 g/dL    Alkaline Phosphatase 91 33 - 136 U/L    Total Protein 5.4 (L) 6.4 - 8.2 g/dL    AST 88 (H) 9 - 39 U/L    Bilirubin, Total 0.5 0.0 - 1.2 mg/dL     (H) 7 - 45 U/L   Creatine Kinase    Collection Time: 10/07/23  6:46 AM   Result Value Ref Range    Creatine Kinase 1,495 (H) 0 - 215 U/L   POCT GLUCOSE    Collection Time: 10/07/23  8:24 AM   Result Value Ref Range    POCT Glucose 242 (H) 74 - 99 mg/dL   POCT GLUCOSE    Collection Time: 10/07/23 11:23 AM   Result Value Ref Range    POCT Glucose 257 (H) 74 - 99 mg/dL          Assessment/Plan   Bisi Mckay is a 64 y/o F presenting with symptoms of muscular weakness concerning for inflammatory/autoimmune myositis. ACS is consulted for evaluation for muscle biopsy. She is in stable condition.    PLAN:  - Recommend consulting Dr. Stubbs of radiology/radiology scheduling for evaluation for image-guided muscle bx  - If unable to set up muscle bx with radiology, please re-engage our team to schedule one  - ACS will continue to follow.  - Remainder of care per primary team    Patient discussed and staffed with Dr. Carbone.    Bteo Shetty MD  PGY-1 General Surgery

## 2023-10-07 NOTE — SIGNIFICANT EVENT
Updated Neurology recs    Assessment:  Bisi Mckay is a 63 y.o. female with a history of T2DM, PBC, and breast cancer who is admitted to the MICU for acute hypoxemic respiratory failure. Neurology is following for evaluation of proximal weakness. Distribution of weakness is most-consistent with myopathy; NMJ disorder is possible but less likely. Will continue to follow for workup.     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     Recommendations:  - Please obtain MRI C, T and L spine with and without contrast (if unable to give contrast, please obtain without contrast)  - Please send antimitochondrial antibody. Please additionally check anti-HMG-CoA Reductase antibody (patient does not take statins, but these antibodies can also form in response to certain foods).  - Agree with Rheumatology's plan for treatment.     Lea Bernal MD  PGY-4 Neurology    Gen Neuro Pager: [phone]  Stroke Pager: [phone]  Epilepsy Pager: [phone]  [facility] Pager: [phone]  Peds Neuro Pager: [phone]

## 2023-10-07 NOTE — PROGRESS NOTES
"Bisi Mckay is a 63 y.o. female on day 6 of admission presenting with Muscle weakness.    Subjective   NAOE. Patient woke up this morning with a swollen and painful LUE. That was also the arm through which she received the IVIG treatment. She describes the arm as tense and painful. Patient otherwise feels okay. Reports they did the EMG on her other arm.       Objective     Physical Exam  Vitals reviewed.   Constitutional:       Comments: Laying in some discomfort in bed   HENT:      Head: Normocephalic and atraumatic.      Mouth/Throat:      Mouth: Mucous membranes are moist.   Eyes:      Extraocular Movements: Extraocular movements intact.   Pulmonary:      Effort: Pulmonary effort is normal.      Breath sounds: No stridor. No wheezing, rhonchi or rales.   Abdominal:      General: Abdomen is flat.      Palpations: Abdomen is soft.      Tenderness: There is no abdominal tenderness.   Musculoskeletal:         General: No swelling or deformity.      Cervical back: Normal range of motion.      Comments: LUE tense and swollen. Bilateral LE swelling in feet. No point tenderness   Skin:     General: Skin is warm.      Findings: Bruising present. No rash.   Neurological:      Mental Status: She is alert and oriented to person, place, and time.      Cranial Nerves: Cranial nerves 2-12 are intact. No dysarthria or facial asymmetry.      Sensory: No sensory deficit.      Motor: No tremor.         Last Recorded Vitals  Blood pressure 140/81, pulse 86, temperature 36.2 °C (97.2 °F), temperature source Temporal, resp. rate 17, height 1.651 m (5' 5\"), weight 115 kg (252 lb 6.8 oz), SpO2 95 %.  Intake/Output last 3 Shifts:  I/O last 3 completed shifts:  In: 4815 (42.8 mL/kg) [P.O.:120; I.V.:100 (0.9 mL/kg); NG/GT:2995; IV Piggyback:1600]  Out: 3626 (32.2 mL/kg) [Urine:3625 (0.9 mL/kg/hr); Stool:1]  Weight: 112.5 kg     Relevant Results    Current Facility-Administered Medications:     carvedilol (Coreg) tablet 6.25 mg, 6.25 mg, " oral, BID, Lesly Parra MD, 6.25 mg at 10/07/23 0825    dextrose 10 % infusion, 0.3 g/kg/hr, intravenous, Once PRN, Alysa Castro MD, Stopped at 10/02/23 0700    dextrose 50 % injection 25 g, 25 g, intravenous, q15 min PRN, Alysa Castro MD, 25 g at 10/01/23 1630    esomeprazole (NexIUM) suspension 40 mg, 40 mg, nasoduodenal tube, Daily before breakfast, Alysa Castro MD, 40 mg at 10/07/23 0826    flu vacc [id number] (65yr up)-PF (Fluzone High-Dose Quad) syringe 0.7 mL, 0.7 mL, intramuscular, During hospitalization, Alysa Castro MD    gabapentin (Neurontin) solution 300 mg, 300 mg, oral, Daily before breakfast, Alysa Castro MD, 300 mg at 10/07/23 0826    gabapentin (Neurontin) solution 900 mg, 900 mg, oral, Nightly, Alysa Castro MD, 900 mg at 10/06/23 2122    glucagon (Glucagen) injection 1 mg, 1 mg, intramuscular, q15 min PRN, Alysa Castro MD    heparin (porcine) injection 5,000 Units, 5,000 Units, subcutaneous, q8h, Alysa Castro MD, 5,000 Units at 10/07/23 0430    insulin lispro (HumaLOG) injection 0-15 Units, 0-15 Units, subcutaneous, TID with meals, Lesly Parra MD, 6 Units at 10/07/23 0826    methylPREDNISolone sod succinate (PF) (SOLU-Medrol) 40 mg/mL injection 60 mg, 60 mg, intravenous, Daily, Eber Jimenez MD, 60 mg at 10/06/23 2316    perflutren protein A microsphere (Optison) injection 0.5 mL, 0.5 mL, intravenous, Once in imaging, Lesly Parra MD    pneumococcal conjugate (Prevnar 13) 0.5 mL vaccine 0.5 mL, 0.5 mL, intramuscular, During hospitalization, Alysa Castro MD    sennosides-docusate sodium (Ramila-Colace) 8.6-50 mg per tablet 1 tablet, 1 tablet, oral, Nightly, Alysa Castro MD, 1 tablet at 10/02/23 2013    sulfur hexafluoride microsphr (Lumason) injection 24.28 mg, 2 mL, intravenous, Once in imaging, Lesly Parra MD     Results for orders placed or performed during the hospital encounter  of 10/01/23 (from the past 24 hour(s))   POCT GLUCOSE   Result Value Ref Range    POCT Glucose 190 (H) 74 - 99 mg/dL   Comprehensive metabolic panel   Result Value Ref Range    Glucose 175 (H) 74 - 99 mg/dL    Sodium 145 136 - 145 mmol/L    Potassium 4.3 3.5 - 5.3 mmol/L    Chloride 110 (H) 98 - 107 mmol/L    Bicarbonate 27 21 - 32 mmol/L    Anion Gap 12 10 - 20 mmol/L    Urea Nitrogen 23 6 - 23 mg/dL    Creatinine 0.73 0.50 - 1.05 mg/dL    eGFR >90 >60 mL/min/1.73m*2    Calcium 8.3 (L) 8.6 - 10.6 mg/dL    Albumin 2.3 (L) 3.4 - 5.0 g/dL    Alkaline Phosphatase 87 33 - 136 U/L    Total Protein 4.7 (L) 6.4 - 8.2 g/dL     (H) 9 - 39 U/L    Bilirubin, Total 0.5 0.0 - 1.2 mg/dL     (H) 7 - 45 U/L   CBC   Result Value Ref Range    WBC 8.9 4.4 - 11.3 x10*3/uL    nRBC 0.0 0.0 - 0.0 /100 WBCs    RBC 3.58 (L) 4.00 - 5.20 x10*6/uL    Hemoglobin 10.4 (L) 12.0 - 16.0 g/dL    Hematocrit 34.6 (L) 36.0 - 46.0 %    MCV 97 80 - 100 fL    MCH 29.1 26.0 - 34.0 pg    MCHC 30.1 (L) 32.0 - 36.0 g/dL    RDW 13.9 11.5 - 14.5 %    Platelets 393 150 - 450 x10*3/uL    MPV 8.8 7.5 - 11.5 fL   Creatine Kinase   Result Value Ref Range    Creatine Kinase 2,526 (H) 0 - 215 U/L   Phosphorus   Result Value Ref Range    Phosphorus 2.8 2.5 - 4.9 mg/dL   POCT GLUCOSE   Result Value Ref Range    POCT Glucose 152 (H) 74 - 99 mg/dL   POCT GLUCOSE   Result Value Ref Range    POCT Glucose 128 (H) 74 - 99 mg/dL   CBC   Result Value Ref Range    WBC 8.8 4.4 - 11.3 x10*3/uL    nRBC 0.0 0.0 - 0.0 /100 WBCs    RBC 3.67 (L) 4.00 - 5.20 x10*6/uL    Hemoglobin 10.9 (L) 12.0 - 16.0 g/dL    Hematocrit 33.9 (L) 36.0 - 46.0 %    MCV 92 80 - 100 fL    MCH 29.7 26.0 - 34.0 pg    MCHC 32.2 32.0 - 36.0 g/dL    RDW 13.2 11.5 - 14.5 %    Platelets 364 150 - 450 x10*3/uL    MPV 9.2 7.5 - 11.5 fL   Comprehensive metabolic panel   Result Value Ref Range    Glucose 278 (H) 74 - 99 mg/dL    Sodium 138 136 - 145 mmol/L    Potassium 4.6 3.5 - 5.3 mmol/L    Chloride 102  98 - 107 mmol/L    Bicarbonate 26 21 - 32 mmol/L    Anion Gap 15 10 - 20 mmol/L    Urea Nitrogen 27 (H) 6 - 23 mg/dL    Creatinine 0.82 0.50 - 1.05 mg/dL    eGFR 79 >60 mL/min/1.73m*2    Calcium 8.4 (L) 8.6 - 10.6 mg/dL    Albumin 2.5 (L) 3.4 - 5.0 g/dL    Alkaline Phosphatase 91 33 - 136 U/L    Total Protein 5.4 (L) 6.4 - 8.2 g/dL    AST 88 (H) 9 - 39 U/L    Bilirubin, Total 0.5 0.0 - 1.2 mg/dL     (H) 7 - 45 U/L   Creatine Kinase   Result Value Ref Range    Creatine Kinase 1,495 (H) 0 - 215 U/L   POCT GLUCOSE   Result Value Ref Range    POCT Glucose 242 (H) 74 - 99 mg/dL   POCT GLUCOSE   Result Value Ref Range    POCT Glucose 257 (H) 74 - 99 mg/dL             Assessment/Plan   Principal Problem:    Muscle weakness  Active Problems:    Acute hypoxic respiratory failure (CMS/HCC)    Diabetes mellitus, type 2 (CMS/HCC)    64 yo F w/ PMHx primary biliary cirrhosis, HTN, GERD, DM, hx of breast cancer s/p lumpectomy and radiation, and osteoarthritis presented to [place] on 9/25/23 for weakness and falls [facility] for further workup of acute hypoxic respiratory failure suspected s/t muscular weakness and rheumatologic/neurologic work-up.    Patient reports she first started noticing weakness following a trip to [place]. On the 23rd of September she had a fall and later on the 25th she was found down after hours of being on the ground next to her bed, without losing consciousness.   She was admitted to medicine for rhabdo (Cr 1.75, CK 2880). Hospital course c/b UTI, worsening SKIP 2/2 rhabdo (CK peaked at 11.773). On the 28th developed respiratory failure and got intubated and transferred to the ICU. Patient extubated 10/3. Transferred to floor 10/4 for further work-up of weakness.     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 Patient can have inflammatory myositis with only minimally elevated CK. Also consider GBS given hx or URI and chart  reported ascending weakness and paraneoplastic process given breast CA hx. Per neurology, proximal pattern of weakness (deltoids, hip flexors >) localizes to anterior horn cell or below suggestive of NMJ-opathy or myopathy, more likely myopathy given elevated CK. Re-discussed need for MRI spine. No need at this time as symptoms not concerning for myelopathy or radiculopathy. Also follow-up pending lab work and continue to trend CK.    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  - hepatitis panel NR  - decreased Coreg to 3.125mg BID d/t softer Bps; can uptitrate as necessary again  - started oxycodone 5mg Q6H PRN LUE Swelling Related Pain, to be stopped after 2days  [ ] fu calorie count  [ ] fu Rheum russ and recs  [ ] C/s ACS per Rheum for muscle bx of non EMG arm (EMG was done on RUE, plan for muscle bx of LUE so long as swelling improves by Monday)  [ ] plan for DVT LUE US on 10/8 AM if able to tolerate; likely that LUE swelling is 2/2 to IV infiltration post IVIG, but ordered to r/o DVT    Updates 10/6:  -FWFs increased to 500 q6h yesterday, Na148 free water deficit almost 3L  -rae placed yesterday for rentention  -Patient's strength in UE and energy level seems to be much better today, able to speak more loudly and clearly  -MBS completed, will attempt PO and keep NG for FWFs  -EMG and TTE completed today  -SBPs mostly 150-160s, added coreg 6.25 mg BID  -NIF ordered    #Proximal Muscle Weakness  #AHRF requiring intubation, resolved  #Dysphagia  -ddx: R/o Guillaine Angwin, Lyme, MG, Polymyositis, MS, NMJ etiology, DM, necrotizing myopathy  -MRI brain 9/10 with minimal chronic microvascular angiopathy. No acute intracranial abnormality.   -CT head and C-spine 9/25 no acute abnormality.   -CXR unremarkable, respiratory panel negative, MRSA nares negative  -Anti-Ach, Lyme 2 step, NORMA, aldolase, ANCA,  "AMA, RF, anti-CCP negative  -Anti-SSA/SSB negative, Anti-Lamar negative  -ESR wnl, CRP 13 (high)  -TSH low, fT4 wnl, anti-TPO ab wnl  -B12: >2000  -C3 132, C4 56 (high), Ceruloplasmin 37 (wnl)  -Cryoglobulin neg   -AM cortisol: wnl   -LDH  >750  -RPR, HIV negative  -CSF viral panel negative, normal cell count and diff, normal protein  -CSF Culture (9/29/23)- no organisms seen  -Blood cultures and fungal cultures negative  -Pending: Anti-Ach, Lyme Ab, MuSK, ANCA, extended myositis panel, aldolase, HMG-CoAR Ab, vit D, T-spot, peripheral smear, HepB core AB, HepB surface AB and antigen, Hep C Ab  -Neurology consulted, appreciate recs  -f/u labs, EMG completed, f/u results  -no need for MRI  -Rheumatology consulted, appreciate recs  -follow-up NORMA/GERRY, extended myositis panel, hep C, hep b ag, ab and core total ab, TB-SPOT, vitamin D  -urine myoglobin positive  -daily trend for CPK, LFT's  -EMG completed, f/u  -MRI/Muscle biopsy need pending above  -SLP consulted, MBS completed 10/6   -minced, moist diet and mod thickened liquid  -Dobhoff keep DH for FWFs  -NIF ordered    #Tachycardia  -HR 110s-120s starting 10/4, normotensive-hypertensive  -ECG NSR  -s/p 1.5L w/o resolution  -complete TTE with IVC eval ordered to eval volume status    #SKIP, resolving  #Rhabdomyolysis   #HTN  -etiology of SKIP likely ATN s/t rhabdo  -CK elevated, 2k on admission -> peaked 11,773 (9/30) -> now 1.7K  -downtrended with IV fluids  -Baseline Cr 0.7, Cr 1.75 on admission -> 2.5 -> 0.9 now  -Renal US 9/27 only significant for borderline increased cortical echogenicity  -UA (9/29): large blood, 3-5 RBCs, +protein, trace leuk est, 1+ bacteria, +casts  -urine myoglobin +   -started coreg 6.25 BID 10/6  -rae placed 10/6  -daily CK  -daily RFP    #Urinary Frequency  -patient has bladder stimulating device placed in 2021 for \"frequent urination\"  -rae removed in MICU  -PVR this afternoon 490ml, straight cath x1 -> recheck in evening, may need to " "replace rae  -device has \"M-mode\" that is MRI safe    #UTI, resolved  -reportedly completed course CTX starting 9/26, not seen in EMR    #T2DM  -hold home metformin  -mod SSI  -hypoglycemia protocol  -POCT glucose q4    F: prn  E: replete prn  N: moist, minced diet with mod thickened liquids  GI ppx: PPI  DVT: SQH  Access: PIVs    Dispo: pending further work-up    Code status: full code (confirmed on floor transfer)     Ольга Saleh MD      "

## 2023-10-08 LAB
ALBUMIN SERPL BCP-MCNC: 2.2 G/DL (ref 3.4–5)
ALP SERPL-CCNC: 72 U/L (ref 33–136)
ALT SERPL W P-5'-P-CCNC: 78 U/L (ref 7–45)
ANION GAP SERPL CALC-SCNC: 13 MMOL/L (ref 10–20)
AST SERPL W P-5'-P-CCNC: 56 U/L (ref 9–39)
BASOPHILS # BLD AUTO: 0.03 X10*3/UL (ref 0–0.1)
BASOPHILS NFR BLD AUTO: 0.3 %
BILIRUB SERPL-MCNC: 0.4 MG/DL (ref 0–1.2)
BUN SERPL-MCNC: 37 MG/DL (ref 6–23)
CALCIUM SERPL-MCNC: 8 MG/DL (ref 8.6–10.6)
CHLORIDE SERPL-SCNC: 105 MMOL/L (ref 98–107)
CK SERPL-CCNC: 638 U/L (ref 0–215)
CO2 SERPL-SCNC: 24 MMOL/L (ref 21–32)
CREAT SERPL-MCNC: 0.9 MG/DL (ref 0.5–1.05)
EOSINOPHIL # BLD AUTO: 0 X10*3/UL (ref 0–0.7)
EOSINOPHIL NFR BLD AUTO: 0 %
ERYTHROCYTE [DISTWIDTH] IN BLOOD BY AUTOMATED COUNT: 13.6 % (ref 11.5–14.5)
EST. AVERAGE GLUCOSE BLD GHB EST-MCNC: 128 MG/DL
GFR SERPL CREATININE-BSD FRML MDRD: 71 ML/MIN/1.73M*2
GLUCOSE BLD MANUAL STRIP-MCNC: 132 MG/DL (ref 74–99)
GLUCOSE BLD MANUAL STRIP-MCNC: 156 MG/DL (ref 74–99)
GLUCOSE BLD MANUAL STRIP-MCNC: 202 MG/DL (ref 74–99)
GLUCOSE BLD MANUAL STRIP-MCNC: 231 MG/DL (ref 74–99)
GLUCOSE SERPL-MCNC: 233 MG/DL (ref 74–99)
HBA1C MFR BLD: 6.1 %
HCT VFR BLD AUTO: 32.4 % (ref 36–46)
HGB BLD-MCNC: 10 G/DL (ref 12–16)
IMM GRANULOCYTES # BLD AUTO: 0.09 X10*3/UL (ref 0–0.7)
IMM GRANULOCYTES NFR BLD AUTO: 1 % (ref 0–0.9)
LYMPHOCYTES # BLD AUTO: 0.82 X10*3/UL (ref 1.2–4.8)
LYMPHOCYTES NFR BLD AUTO: 9.2 %
MAGNESIUM SERPL-MCNC: 1.74 MG/DL (ref 1.6–2.4)
MAGNESIUM SERPL-MCNC: 1.91 MG/DL (ref 1.6–2.4)
MCH RBC QN AUTO: 30.1 PG (ref 26–34)
MCHC RBC AUTO-ENTMCNC: 30.9 G/DL (ref 32–36)
MCV RBC AUTO: 98 FL (ref 80–100)
MONOCYTES # BLD AUTO: 0.2 X10*3/UL (ref 0.1–1)
MONOCYTES NFR BLD AUTO: 2.2 %
NEUTROPHILS # BLD AUTO: 7.81 X10*3/UL (ref 1.2–7.7)
NEUTROPHILS NFR BLD AUTO: 87.3 %
NRBC BLD-RTO: 0 /100 WBCS (ref 0–0)
PLATELET # BLD AUTO: 315 X10*3/UL (ref 150–450)
PMV BLD AUTO: 8.5 FL (ref 7.5–11.5)
POTASSIUM SERPL-SCNC: 3.8 MMOL/L (ref 3.5–5.3)
PROT SERPL-MCNC: 5 G/DL (ref 6.4–8.2)
RBC # BLD AUTO: 3.32 X10*6/UL (ref 4–5.2)
SODIUM SERPL-SCNC: 138 MMOL/L (ref 136–145)
WBC # BLD AUTO: 9 X10*3/UL (ref 4.4–11.3)

## 2023-10-08 PROCEDURE — 85025 COMPLETE CBC W/AUTO DIFF WBC: CPT | Performed by: STUDENT IN AN ORGANIZED HEALTH CARE EDUCATION/TRAINING PROGRAM

## 2023-10-08 PROCEDURE — 2500000004 HC RX 250 GENERAL PHARMACY W/ HCPCS (ALT 636 FOR OP/ED): Performed by: STUDENT IN AN ORGANIZED HEALTH CARE EDUCATION/TRAINING PROGRAM

## 2023-10-08 PROCEDURE — 2500000001 HC RX 250 WO HCPCS SELF ADMINISTERED DRUGS (ALT 637 FOR MEDICARE OP)

## 2023-10-08 PROCEDURE — 36415 COLL VENOUS BLD VENIPUNCTURE: CPT

## 2023-10-08 PROCEDURE — 2500000001 HC RX 250 WO HCPCS SELF ADMINISTERED DRUGS (ALT 637 FOR MEDICARE OP): Performed by: STUDENT IN AN ORGANIZED HEALTH CARE EDUCATION/TRAINING PROGRAM

## 2023-10-08 PROCEDURE — 2500000004 HC RX 250 GENERAL PHARMACY W/ HCPCS (ALT 636 FOR OP/ED)

## 2023-10-08 PROCEDURE — 83735 ASSAY OF MAGNESIUM: CPT

## 2023-10-08 PROCEDURE — 2500000002 HC RX 250 W HCPCS SELF ADMINISTERED DRUGS (ALT 637 FOR MEDICARE OP, ALT 636 FOR OP/ED)

## 2023-10-08 PROCEDURE — 96372 THER/PROPH/DIAG INJ SC/IM: CPT

## 2023-10-08 PROCEDURE — 82947 ASSAY GLUCOSE BLOOD QUANT: CPT

## 2023-10-08 PROCEDURE — 1100000001 HC PRIVATE ROOM DAILY

## 2023-10-08 PROCEDURE — 82550 ASSAY OF CK (CPK): CPT

## 2023-10-08 PROCEDURE — 83036 HEMOGLOBIN GLYCOSYLATED A1C: CPT

## 2023-10-08 PROCEDURE — 80053 COMPREHEN METABOLIC PANEL: CPT

## 2023-10-08 PROCEDURE — 99233 SBSQ HOSP IP/OBS HIGH 50: CPT

## 2023-10-08 PROCEDURE — 83735 ASSAY OF MAGNESIUM: CPT | Performed by: STUDENT IN AN ORGANIZED HEALTH CARE EDUCATION/TRAINING PROGRAM

## 2023-10-08 RX ORDER — MAGNESIUM SULFATE HEPTAHYDRATE 40 MG/ML
4 INJECTION, SOLUTION INTRAVENOUS ONCE
Status: DISCONTINUED | OUTPATIENT
Start: 2023-10-08 | End: 2023-10-08

## 2023-10-08 RX ORDER — DEXTROSE MONOHYDRATE 100 MG/ML
0.3 INJECTION, SOLUTION INTRAVENOUS ONCE AS NEEDED
Status: DISCONTINUED | OUTPATIENT
Start: 2023-10-08 | End: 2023-10-14 | Stop reason: HOSPADM

## 2023-10-08 RX ORDER — DEXTROSE 50 % IN WATER (D50W) INTRAVENOUS SYRINGE
25
Status: DISCONTINUED | OUTPATIENT
Start: 2023-10-08 | End: 2023-10-14 | Stop reason: HOSPADM

## 2023-10-08 RX ORDER — POTASSIUM CHLORIDE 14.9 MG/ML
20 INJECTION INTRAVENOUS ONCE
Status: COMPLETED | OUTPATIENT
Start: 2023-10-08 | End: 2023-10-08

## 2023-10-08 RX ORDER — POTASSIUM CHLORIDE 14.9 MG/ML
20 INJECTION INTRAVENOUS
Status: DISCONTINUED | OUTPATIENT
Start: 2023-10-08 | End: 2023-10-08

## 2023-10-08 RX ADMIN — METHYLPREDNISOLONE SODIUM SUCCINATE 60 MG: 40 INJECTION, POWDER, FOR SOLUTION INTRAMUSCULAR; INTRAVENOUS at 21:07

## 2023-10-08 RX ADMIN — CARVEDILOL 3.12 MG: 3.12 TABLET, FILM COATED ORAL at 08:33

## 2023-10-08 RX ADMIN — HEPARIN SODIUM 5000 UNITS: 5000 INJECTION INTRAVENOUS; SUBCUTANEOUS at 21:07

## 2023-10-08 RX ADMIN — HEPARIN SODIUM 5000 UNITS: 5000 INJECTION INTRAVENOUS; SUBCUTANEOUS at 04:00

## 2023-10-08 RX ADMIN — INSULIN HUMAN 5 UNITS: 100 INJECTION, SUSPENSION SUBCUTANEOUS at 17:52

## 2023-10-08 RX ADMIN — GABAPENTIN 300 MG: 250 SOLUTION ORAL at 08:33

## 2023-10-08 RX ADMIN — INSULIN LISPRO 6 UNITS: 100 INJECTION, SOLUTION INTRAVENOUS; SUBCUTANEOUS at 12:43

## 2023-10-08 RX ADMIN — INSULIN LISPRO 3 UNITS: 100 INJECTION, SOLUTION INTRAVENOUS; SUBCUTANEOUS at 17:52

## 2023-10-08 RX ADMIN — CARVEDILOL 3.12 MG: 3.12 TABLET, FILM COATED ORAL at 21:07

## 2023-10-08 RX ADMIN — OXYCODONE HYDROCHLORIDE 5 MG: 5 TABLET ORAL at 21:07

## 2023-10-08 RX ADMIN — INSULIN LISPRO 6 UNITS: 100 INJECTION, SOLUTION INTRAVENOUS; SUBCUTANEOUS at 08:33

## 2023-10-08 RX ADMIN — POTASSIUM CHLORIDE 20 MEQ: 14.9 INJECTION, SOLUTION INTRAVENOUS at 17:53

## 2023-10-08 RX ADMIN — ESOMEPRAZOLE MAGNESIUM 40 MG: 40 FOR SUSPENSION ORAL at 08:33

## 2023-10-08 RX ADMIN — GABAPENTIN 900 MG: 250 SOLUTION ORAL at 21:07

## 2023-10-08 RX ADMIN — HEPARIN SODIUM 5000 UNITS: 5000 INJECTION INTRAVENOUS; SUBCUTANEOUS at 12:43

## 2023-10-08 ASSESSMENT — PAIN SCALES - GENERAL: PAINLEVEL_OUTOF10: 8

## 2023-10-09 ENCOUNTER — APPOINTMENT (OUTPATIENT)
Dept: RADIOLOGY | Facility: HOSPITAL | Age: 63
DRG: 982 | End: 2023-10-09
Payer: MEDICARE

## 2023-10-09 LAB
ALBUMIN SERPL BCP-MCNC: 2.3 G/DL (ref 3.4–5)
ALP SERPL-CCNC: 74 U/L (ref 33–136)
ALT SERPL W P-5'-P-CCNC: 76 U/L (ref 7–45)
ANION GAP SERPL CALC-SCNC: 16 MMOL/L (ref 10–20)
AST SERPL W P-5'-P-CCNC: 51 U/L (ref 9–39)
BILIRUB SERPL-MCNC: 0.5 MG/DL (ref 0–1.2)
BUN SERPL-MCNC: 39 MG/DL (ref 6–23)
CALCIUM SERPL-MCNC: 7.7 MG/DL (ref 8.6–10.6)
CHLORIDE SERPL-SCNC: 106 MMOL/L (ref 98–107)
CK SERPL-CCNC: 422 U/L (ref 0–215)
CO2 SERPL-SCNC: 23 MMOL/L (ref 21–32)
CREAT SERPL-MCNC: 0.89 MG/DL (ref 0.5–1.05)
ERYTHROCYTE [DISTWIDTH] IN BLOOD BY AUTOMATED COUNT: 13.8 % (ref 11.5–14.5)
GFR SERPL CREATININE-BSD FRML MDRD: 72 ML/MIN/1.73M*2
GLUCOSE BLD MANUAL STRIP-MCNC: 155 MG/DL (ref 74–99)
GLUCOSE BLD MANUAL STRIP-MCNC: 185 MG/DL (ref 74–99)
GLUCOSE BLD MANUAL STRIP-MCNC: 257 MG/DL (ref 74–99)
GLUCOSE SERPL-MCNC: 232 MG/DL (ref 74–99)
HCT VFR BLD AUTO: 34.4 % (ref 36–46)
HGB BLD-MCNC: 10.5 G/DL (ref 12–16)
MAGNESIUM SERPL-MCNC: 1.95 MG/DL (ref 1.6–2.4)
MCH RBC QN AUTO: 29.8 PG (ref 26–34)
MCHC RBC AUTO-ENTMCNC: 30.5 G/DL (ref 32–36)
MCV RBC AUTO: 98 FL (ref 80–100)
NRBC BLD-RTO: 0 /100 WBCS (ref 0–0)
PLATELET # BLD AUTO: 264 X10*3/UL (ref 150–450)
PMV BLD AUTO: 9.3 FL (ref 7.5–11.5)
POTASSIUM SERPL-SCNC: 4.7 MMOL/L (ref 3.5–5.3)
PROT SERPL-MCNC: 5 G/DL (ref 6.4–8.2)
RBC # BLD AUTO: 3.52 X10*6/UL (ref 4–5.2)
SODIUM SERPL-SCNC: 140 MMOL/L (ref 136–145)
WBC # BLD AUTO: 9.3 X10*3/UL (ref 4.4–11.3)

## 2023-10-09 PROCEDURE — A9575 INJ GADOTERATE MEGLUMI 0.1ML: HCPCS

## 2023-10-09 PROCEDURE — 82947 ASSAY GLUCOSE BLOOD QUANT: CPT

## 2023-10-09 PROCEDURE — 72157 MRI CHEST SPINE W/O & W/DYE: CPT | Mod: MG

## 2023-10-09 PROCEDURE — 2500000001 HC RX 250 WO HCPCS SELF ADMINISTERED DRUGS (ALT 637 FOR MEDICARE OP): Performed by: STUDENT IN AN ORGANIZED HEALTH CARE EDUCATION/TRAINING PROGRAM

## 2023-10-09 PROCEDURE — 99232 SBSQ HOSP IP/OBS MODERATE 35: CPT | Performed by: INTERNAL MEDICINE

## 2023-10-09 PROCEDURE — 36415 COLL VENOUS BLD VENIPUNCTURE: CPT

## 2023-10-09 PROCEDURE — 97530 THERAPEUTIC ACTIVITIES: CPT | Mod: GP | Performed by: PHYSICAL THERAPIST

## 2023-10-09 PROCEDURE — 96372 THER/PROPH/DIAG INJ SC/IM: CPT

## 2023-10-09 PROCEDURE — 2500000001 HC RX 250 WO HCPCS SELF ADMINISTERED DRUGS (ALT 637 FOR MEDICARE OP)

## 2023-10-09 PROCEDURE — 72158 MRI LUMBAR SPINE W/O & W/DYE: CPT | Mod: MG

## 2023-10-09 PROCEDURE — 2500000002 HC RX 250 W HCPCS SELF ADMINISTERED DRUGS (ALT 637 FOR MEDICARE OP, ALT 636 FOR OP/ED)

## 2023-10-09 PROCEDURE — 85027 COMPLETE CBC AUTOMATED: CPT

## 2023-10-09 PROCEDURE — 2500000004 HC RX 250 GENERAL PHARMACY W/ HCPCS (ALT 636 FOR OP/ED)

## 2023-10-09 PROCEDURE — 99232 SBSQ HOSP IP/OBS MODERATE 35: CPT

## 2023-10-09 PROCEDURE — 83735 ASSAY OF MAGNESIUM: CPT

## 2023-10-09 PROCEDURE — 80053 COMPREHEN METABOLIC PANEL: CPT

## 2023-10-09 PROCEDURE — 97530 THERAPEUTIC ACTIVITIES: CPT | Mod: GO

## 2023-10-09 PROCEDURE — 82550 ASSAY OF CK (CPK): CPT

## 2023-10-09 PROCEDURE — 1100000001 HC PRIVATE ROOM DAILY

## 2023-10-09 PROCEDURE — G1004 CDSM NDSC: HCPCS

## 2023-10-09 PROCEDURE — 2550000001 HC RX 255 CONTRASTS

## 2023-10-09 PROCEDURE — 2500000004 HC RX 250 GENERAL PHARMACY W/ HCPCS (ALT 636 FOR OP/ED): Performed by: STUDENT IN AN ORGANIZED HEALTH CARE EDUCATION/TRAINING PROGRAM

## 2023-10-09 RX ORDER — GADOTERATE MEGLUMINE 376.9 MG/ML
20 INJECTION INTRAVENOUS
Status: COMPLETED | OUTPATIENT
Start: 2023-10-09 | End: 2023-10-09

## 2023-10-09 RX ORDER — INSULIN LISPRO 100 [IU]/ML
0-10 INJECTION, SOLUTION INTRAVENOUS; SUBCUTANEOUS
Status: DISCONTINUED | OUTPATIENT
Start: 2023-10-09 | End: 2023-10-14 | Stop reason: HOSPADM

## 2023-10-09 RX ORDER — PANTOPRAZOLE SODIUM 40 MG/1
40 TABLET, DELAYED RELEASE ORAL
Status: DISCONTINUED | OUTPATIENT
Start: 2023-10-10 | End: 2023-10-14 | Stop reason: HOSPADM

## 2023-10-09 RX ADMIN — HEPARIN SODIUM 5000 UNITS: 5000 INJECTION INTRAVENOUS; SUBCUTANEOUS at 13:35

## 2023-10-09 RX ADMIN — HEPARIN SODIUM 5000 UNITS: 5000 INJECTION INTRAVENOUS; SUBCUTANEOUS at 22:56

## 2023-10-09 RX ADMIN — CARVEDILOL 3.12 MG: 3.12 TABLET, FILM COATED ORAL at 09:10

## 2023-10-09 RX ADMIN — INSULIN LISPRO 2 UNITS: 100 INJECTION, SOLUTION INTRAVENOUS; SUBCUTANEOUS at 18:41

## 2023-10-09 RX ADMIN — INSULIN LISPRO 2 UNITS: 100 INJECTION, SOLUTION INTRAVENOUS; SUBCUTANEOUS at 13:36

## 2023-10-09 RX ADMIN — CARVEDILOL 3.12 MG: 3.12 TABLET, FILM COATED ORAL at 22:56

## 2023-10-09 RX ADMIN — GADOTERATE MEGLUMINE 20 ML: 376.9 INJECTION INTRAVENOUS at 22:46

## 2023-10-09 RX ADMIN — GABAPENTIN 900 MG: 250 SOLUTION ORAL at 22:55

## 2023-10-09 RX ADMIN — GABAPENTIN 300 MG: 250 SOLUTION ORAL at 09:07

## 2023-10-09 RX ADMIN — PANTOPRAZOLE SODIUM 40 MG: 40 TABLET, DELAYED RELEASE ORAL at 09:07

## 2023-10-09 RX ADMIN — METHYLPREDNISOLONE SODIUM SUCCINATE 60 MG: 40 INJECTION, POWDER, FOR SOLUTION INTRAMUSCULAR; INTRAVENOUS at 22:56

## 2023-10-09 RX ADMIN — INSULIN HUMAN 5 UNITS: 100 INJECTION, SUSPENSION SUBCUTANEOUS at 09:09

## 2023-10-09 RX ADMIN — INSULIN LISPRO 9 UNITS: 100 INJECTION, SOLUTION INTRAVENOUS; SUBCUTANEOUS at 09:08

## 2023-10-09 RX ADMIN — INSULIN HUMAN 5 UNITS: 100 INJECTION, SUSPENSION SUBCUTANEOUS at 18:42

## 2023-10-09 ASSESSMENT — COGNITIVE AND FUNCTIONAL STATUS - GENERAL
MOBILITY SCORE: 6
TURNING FROM BACK TO SIDE WHILE IN FLAT BAD: TOTAL
CLIMB 3 TO 5 STEPS WITH RAILING: TOTAL
MOVING FROM LYING ON BACK TO SITTING ON SIDE OF FLAT BED WITH BEDRAILS: TOTAL
TOILETING: TOTAL
MOVING TO AND FROM BED TO CHAIR: TOTAL
EATING MEALS: A LITTLE
PERSONAL GROOMING: A LITTLE
DAILY ACTIVITIY SCORE: 12
STANDING UP FROM CHAIR USING ARMS: TOTAL
HELP NEEDED FOR BATHING: A LOT
WALKING IN HOSPITAL ROOM: TOTAL
DRESSING REGULAR UPPER BODY CLOTHING: A LOT
DRESSING REGULAR LOWER BODY CLOTHING: TOTAL

## 2023-10-09 ASSESSMENT — PAIN - FUNCTIONAL ASSESSMENT
PAIN_FUNCTIONAL_ASSESSMENT: 0-10

## 2023-10-09 ASSESSMENT — PAIN SCALES - GENERAL
PAINLEVEL_OUTOF10: 0 - NO PAIN

## 2023-10-09 NOTE — CARE PLAN
Problem: Mobility  Goal: patient will ambulate >/=3 ft in order to complete functional tranfers without acute LOB with MAXx2 assist.   Outcome: Progressing       Problem: Transfers  Goal: patient will complete supine<->sit with MODx2 with bedrail as needed without acute LOB   Outcome: Progressing       Problem: Balance  Goal: patient will complete static (Cgx1) and dynamic (MINx1) sitting balance activities using UE support as needed without acute LOB in order to maintain midline  Outcome: Progressing     Problem: Transfers  Goal: Patient will complete STS with MODx2 without acute LOB   Outcome: Progressing

## 2023-10-09 NOTE — PROGRESS NOTES
Transitional Care Coordination Progress Note:  Patient discussed during interdisciplinary rounds.  Team members present: MD, FIONA  Plan per Medical/Surgical team: Plan for muscle biopsy and repeat swallow eval post dobhoff removal.  Payer: Medicare A/B  Status: Inpatient  Discharge disposition: Per chart review SW confirmed with son [facility] on 10/2. Referral submitted to facility via [facility] to see if they are able to accept. Left voice mail message for grady Miller [phone] to confirm above information.  Potential Barriers: none  ADOD: 10/13  Care coordinator will continue to follow for discharge planning needs.     Addendum 1525: Grady Miller returned my call and confirmed [facility]. Grady Miller stated they had initially accepted pt 2 weeks ago at OSH. Son aware referral sent to facility to confirm acceptance, awaiting response.  Addendum 1542: [facility] confirmed they are able to accept pt when medically ready. No precert will be required.     Luz Zarate RN  Transitional Care Coordinator/TCC  [phone]

## 2023-10-09 NOTE — PROGRESS NOTES
"Bisi Mckay is a 63 y.o. female on day 8 of admission presenting with Muscle weakness.    Subjective   NAEO. Patients feels well.       Objective     Physical Exam  Vitals reviewed.   Constitutional:       Comments: Laying comfortably in bed   HENT:      Head: Normocephalic and atraumatic.      Mouth/Throat:      Mouth: Mucous membranes are moist.   Eyes:      Extraocular Movements: Extraocular movements intact.   Pulmonary:      Effort: Pulmonary effort is normal.      Breath sounds: No stridor. No wheezing, rhonchi or rales.   Abdominal:      General: Abdomen is flat.      Palpations: Abdomen is soft.      Tenderness: There is no abdominal tenderness.   Musculoskeletal:         General: No swelling or deformity.      Cervical back: Normal range of motion.      Comments: LUE swelling reduced. Bilateral LE swelling in feet. No point tenderness   Skin:     General: Skin is warm.      Findings: Bruising present. No rash.   Neurological:      Mental Status: She is alert and oriented to person, place, and time.      Cranial Nerves: Cranial nerves 2-12 are intact. No dysarthria or facial asymmetry.      Sensory: No sensory deficit.      Motor: No tremor.         Last Recorded Vitals  Blood pressure 116/66, pulse 101, temperature 36.2 °C (97.2 °F), resp. rate 18, height 1.651 m (5' 5\"), weight 115 kg (252 lb 6.8 oz), SpO2 90 %.  Intake/Output last 3 Shifts:  I/O last 3 completed shifts:  In: 600 (5.2 mL/kg) [P.O.:600]  Out: 1900 (16.6 mL/kg) [Urine:1900 (0.5 mL/kg/hr)]  Weight: 114.5 kg     Relevant Results    Current Facility-Administered Medications:     carvedilol (Coreg) tablet 3.125 mg, 3.125 mg, oral, BID, Ольга Saleh MD, 3.125 mg at 10/09/23 0910    dextrose 10 % in water (D10W) infusion, 0.3 g/kg/hr, intravenous, Once PRN, Lesly Parra MD    dextrose 50 % injection 25 g, 25 g, intravenous, q15 min PRN, Lesly Parra MD    flu vacc [id number] (65yr up)-PF (Fluzone High-Dose Quad) syringe 0.7 mL, 0.7 " mL, intramuscular, During hospitalization, Alysa Castro MD    gabapentin (Neurontin) solution 300 mg, 300 mg, oral, Daily before breakfast, Alysa Castro MD, 300 mg at 10/09/23 0907    gabapentin (Neurontin) solution 900 mg, 900 mg, oral, Nightly, Alysa Castro MD, 900 mg at 10/08/23 2107    glucagon (Glucagen) injection 1 mg, 1 mg, intramuscular, q15 min PRN, Lesly Parra MD    heparin (porcine) injection 5,000 Units, 5,000 Units, subcutaneous, q8h, Alysa Castro MD, 5,000 Units at 10/08/23 2107    insulin lispro (HumaLOG) injection 0-15 Units, 0-15 Units, subcutaneous, TID with meals, Lesly Parra MD, 9 Units at 10/09/23 0908    insulin NPH (Isophane) (HumuLIN N,NovoLIN N) injection 5 Units, 5 Units, subcutaneous, BID AC, Lesly Parra MD, 5 Units at 10/09/23 0909    methylPREDNISolone sod succinate (PF) (SOLU-Medrol) 40 mg/mL injection 60 mg, 60 mg, intravenous, Daily, Eber Jimenez MD, 60 mg at 10/08/23 2107    oxyCODONE (Roxicodone) immediate release tablet 5 mg, 5 mg, oral, q6h PRN, Ольга Saleh MD, 5 mg at 10/08/23 2107    [START ON 10/10/2023] pantoprazole (ProtoNix) EC tablet 40 mg, 40 mg, oral, Daily before breakfast, Lesly Parra MD, 40 mg at 10/09/23 0907    perflutren protein A microsphere (Optison) injection 0.5 mL, 0.5 mL, intravenous, Once in imaging, Lesly Parra MD    pneumococcal conjugate (Prevnar 13) 0.5 mL vaccine 0.5 mL, 0.5 mL, intramuscular, During hospitalization, Alysa Castro MD    sennosides-docusate sodium (Ramila-Colace) 8.6-50 mg per tablet 1 tablet, 1 tablet, oral, Nightly, Alysa Castro MD, 1 tablet at 10/07/23 2040    sulfur hexafluoride microsphr (Lumason) injection 24.28 mg, 2 mL, intravenous, Once in imaging, Lesly Parra MD     Results for orders placed or performed during the hospital encounter of 10/01/23 (from the past 24 hour(s))   POCT GLUCOSE   Result Value Ref Range    POCT Glucose 202  (H) 74 - 99 mg/dL   Hemoglobin A1c   Result Value Ref Range    Hemoglobin A1C 6.1 (H) see below %    Estimated Average Glucose 128 Not Established mg/dL   Magnesium   Result Value Ref Range    Magnesium 1.91 1.60 - 2.40 mg/dL   POCT GLUCOSE   Result Value Ref Range    POCT Glucose 156 (H) 74 - 99 mg/dL   POCT GLUCOSE   Result Value Ref Range    POCT Glucose 132 (H) 74 - 99 mg/dL   POCT GLUCOSE   Result Value Ref Range    POCT Glucose 257 (H) 74 - 99 mg/dL   Magnesium   Result Value Ref Range    Magnesium 1.95 1.60 - 2.40 mg/dL   Comprehensive metabolic panel   Result Value Ref Range    Glucose 232 (H) 74 - 99 mg/dL    Sodium 140 136 - 145 mmol/L    Potassium 4.7 3.5 - 5.3 mmol/L    Chloride 106 98 - 107 mmol/L    Bicarbonate 23 21 - 32 mmol/L    Anion Gap 16 10 - 20 mmol/L    Urea Nitrogen 39 (H) 6 - 23 mg/dL    Creatinine 0.89 0.50 - 1.05 mg/dL    eGFR 72 >60 mL/min/1.73m*2    Calcium 7.7 (L) 8.6 - 10.6 mg/dL    Albumin 2.3 (L) 3.4 - 5.0 g/dL    Alkaline Phosphatase 74 33 - 136 U/L    Total Protein 5.0 (L) 6.4 - 8.2 g/dL    AST 51 (H) 9 - 39 U/L    Bilirubin, Total 0.5 0.0 - 1.2 mg/dL    ALT 76 (H) 7 - 45 U/L   Creatine Kinase   Result Value Ref Range    Creatine Kinase 422 (H) 0 - 215 U/L   CBC   Result Value Ref Range    WBC 9.3 4.4 - 11.3 x10*3/uL    nRBC 0.0 0.0 - 0.0 /100 WBCs    RBC 3.52 (L) 4.00 - 5.20 x10*6/uL    Hemoglobin 10.5 (L) 12.0 - 16.0 g/dL    Hematocrit 34.4 (L) 36.0 - 46.0 %    MCV 98 80 - 100 fL    MCH 29.8 26.0 - 34.0 pg    MCHC 30.5 (L) 32.0 - 36.0 g/dL    RDW 13.8 11.5 - 14.5 %    Platelets 264 150 - 450 x10*3/uL    MPV 9.3 7.5 - 11.5 fL           Assessment/Plan   Principal Problem:    Muscle weakness  Active Problems:    Acute hypoxic respiratory failure (CMS/HCC)    Diabetes mellitus, type 2 (CMS/HCC)    64 yo F w/ PMHx primary biliary cirrhosis, HTN, GERD, DM, hx of breast cancer s/p lumpectomy and radiation, and osteoarthritis presented to [place] on 9/25/23 for weakness and falls  [facility] for further workup of acute hypoxic respiratory failure suspected s/t muscular weakness and rheumatologic/neurologic work-up.    Patient reports she first started noticing weakness following a trip to [place]. On the 23rd of September she had a fall and later on the 25th she was found down after hours of being on the ground next to her bed, without losing consciousness. She was admitted to medicine for rhabdo (Cr 1.75, CK 2880). Hospital course c/b UTI, worsening SKIP 2/2 rhabdo (CK peaked at 11.773). On the 28th developed respiratory failure and got intubated and transferred to the ICU. Patient extubated 10/3. Transferred to floor 10/4 for further work-up of weakness.     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 Got first dose on IVIG 10/6 and started solu-medrol 60 IV daily per rheum. Plan for MRI spine per neuro. ACS consulted for muscle biopsy recommended IR consult for biopsy with Dr. Stubbs.    Updates 10/8:  -DHT removed, tolerating diet, Na stable  -MRI pan-spine today per neurology  -bladder stimulating device cleared for MRI if turned off, remote in patient's room  -IR consulted for muscle biopsy, f/u  -  -extended myositis panel, HMG-CoAR Ab pending  -consider adding bactrim for PJP ppx pending duration of steroids    #Proximal Muscle Weakness  #AHRF requiring intubation, resolved  #Dysphagia  -MRI brain 9/10 with minimal chronic microvascular angiopathy. No acute intracranial abnormality.   -CT head and C-spine 9/25 no acute abnormality.   -CXR unremarkable, respiratory panel negative, MRSA nares negative  -Anti-Ach, Lyme 2 step, NORMA, aldolase, ANCA, AMA, RF, anti-CCP, Anti-SSA/SSB, Anti-Lamar negative  -ESR wnl, CRP 13 (high)  -TSH low, fT4 wnl, anti-TPO ab wnl  -B12: >2000  -C3 132, C4 56 (high), Ceruloplasmin 37 (wnl)  -Cryoglobulin neg   -AM cortisol: wnl   -LDH  >750  -RPR, HIV  "negative, hepatitis panel negative  -CSF viral panel negative, normal cell count and diff, normal protein, negative cultures  -Blood cultures and fungal cultures negative  -pending HMG-CoAR Ab, extended myositis panel, TB-spot  -Neurology consulted, appreciate recs  -EMG 10/6 demonstrates abnormal RUE/RLE w/ electrophysiologic evidence of a myopathy w/ fibrillation potentials consistent w/ a necrotizing myopathy  -MRI pan-spine ordered  -Rheumatology consulted, appreciate recs   -s/p IVIG 10/6   -Solu-medrol 60 mg IV daily  -ACS consulted for muscle biopsy, recommended IR consult for biopsy with Dr. Stubbs (LUE as long as swelling improves)  -IR consulted  -daily trend for CPK, LFT's  -SLP consulted, MBS completed 10/6: minced, moist diet and mod thickened liquid  -NIF ordered    #Tachycardia  -HR 110s-120s starting 10/4, normotensive-hypertensive  -ECG NSR  -TTE 10/5 w/ normal left ventricular systolic function, EF 65-70%. Slightly elevated RVSP    #Steroid-induced Hyperglycemia  #T2DM  -A1c 10/8 6.1  -hold home metformin  -mod SSI  -added NPH 5 BID 10/8  -hypoglycemia protocol    #SKIP, resolved  #Rhabdomyolysis   #HTN  -etiology of SKIP likely ATN s/t rhabdo  -CK elevated, 2k on admission -> peaked 11,773 (9/30) -> downtrending since w/ fluids  -Baseline Cr 0.7, Cr 1.75 on admission -> 2.5 -> 0.9 now  -Renal US 9/27 only significant for borderline increased cortical echogenicity  -UA (9/29): large blood, 3-5 RBCs, +protein, trace leuk est, 1+ bacteria, +casts  -urine myoglobin +   -started coreg 6.25 BID 10/6  -rae placed 10/6  -daily CK  -daily RFP    #Hx of urinary Frequency  #Urinary Rentention  -patient has bladder stimulating device placed in 2021 for \"frequent urination\"  -rae placed 10/6  -device has \"M-mode\" that is MRI safe    F: prn  E: replete prn  N: moist, minced diet with mod thickened liquids  GI ppx: PPI  DVT: SQH  Access: PIVs    Dispo: pending further work-up    Code status: full code " (confirmed on floor transfer)     Lesly Parra MD

## 2023-10-09 NOTE — PROGRESS NOTES
"Bisi Mckay is a 63 y.o. female on day 8 of admission presenting with Muscle weakness.    Subjective   Bisi Mckay is a 63 y.o. female on day 8 of admission presenting with Muscle weakness.    Subjective   Patient stable overall with no new complaints. Reports strength is improving in her upper arms.  No difficulty swallowing, no fever or other complaints.        Objective     Physical Exam  M/P Upper proximal arms 4/5 bilaterally   Distal upper arm 5/5   Hip flexors 3/5, 4+/5 with extension, abduction and adduction   No new skin rashes    Last Recorded Vitals  Blood pressure 127/63, pulse 92, temperature 36.3 °C (97.3 °F), resp. rate 17, height 1.651 m (5' 5\"), weight 115 kg (252 lb 6.8 oz), SpO2 96 %.  Intake/Output last 3 Shifts:  I/O last 3 completed shifts:  In: 600 (5.2 mL/kg) [P.O.:600]  Out: 1900 (16.6 mL/kg) [Urine:1900 (0.5 mL/kg/hr)]  Weight: 114.5 kg                 Assessment/Plan   Bisi Mckay is a 63 y.o. female with PMHx of primary biliary cirrhosis, HTN, GERD, DM, breast cancer, and osteoarthritis, who initially presented to [facility] on 9/25 for weakness and fall. She was then transferred to  MICU for acute hypoxic respiratory failure and rheumatologic/neuromuscular consult.  Patient was noted to have bilateral symmetric proximal weakness involving the deltoid and hip flexors, with dysphagia, and later developed acute hypoxic respiratory failure. No evident ILD changes on xray( no CT chest), no inflammatory arthritis, rashes or Raynaud's.  Her CPK on admission 2K, peaked at 11K and then down trended now to 422 with IV hydration.     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   Extended myositis panel, HMG-co reductase are still pending.      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   Getting a muscle biopsy and the extended myositis panel would identify which type of Myopathy this is and would better guide our immunosuppressive treatment plan.       Plan:  -proximal muscle biopsy from the opposite side of the EMG ( to avoid having artifacts on biopsy secondary to the EMG needles)  -Continue Solu-medrol 60 mg IV daily   -Received IVIG 2g/Kg infusion once on 10/6/2023, given the severity of her presentation, to be redosed in a month  -Please repeat CPK today and daily   -Pending TB-spot  -Pending EMP and HMG-co reductase ab   -Please start patient on Vitamin D and calcium as bone protective measures  -Patient will eventually need to be started on PJP ppx with bactrim three times weekly if there is no contraindication  -She will need to get a HR CT chest and CT A/P for ILD and malignancy screen. (Ideally to be done as inpatient before discharge)        Marsha Becerra MD  Rheumatology Fellow,PGY-V    Patient seen with Dr. Lezama    Rheumatology Attending    I interviewed and examined the patient and discussed the treatment plan. See above note for details.  I agree with the findings and plan of care summarized in the above note.       Shar Lezama MD

## 2023-10-09 NOTE — PROGRESS NOTES
Physical Therapy    Physical Therapy Treatment    Patient Name: Bisi Mckay  MRN: [id number]  Today's Date: 10/9/2023  Time Calculation  Start Time: 1522  Stop Time: 1606  Time Calculation (min): 44 min       Assessment/Plan     End of Session Patient Position: Bed, 3 rail up, Alarm on not on at start of session  PT Plan  Inpatient/Swing Bed or Outpatient: Inpatient  PT Plan: Skilled PT  PT Frequency: 4 times per week  PT Discharge Recommendations: High intensity level of continued care      General Visit Information:   PT  Visit  PT Received On: 10/09/23  General  Reason for Referral: Acute hypoxic respiratory failure, weakness, fall  Co-Treatment: OT  Co-Treatment Reason: Partial Cotx with OT required due to skilled technique required for safe functional transfers and to facilitate maximum participation with skilled intervention.  Prior to Session Communication: Bedside nurse  Patient Position Received: Bed, 3 rail up, Alarm on  General Comment: Pt supine in bed upon arrival, plesant and agreeble to participate in Therapy session. Motivated.    Subjective   Precautions:  Medical Precautions: Fall precautions  Vital Signs:  Vital Signs  Heart Rate: 92  SpO2:  (96)    Objective   Pain:  Pain Assessment  Pain Assessment: 0-10  Pain Score: 0 - No pain  Cognition:  Cognition  Overall Cognitive Status: Within Functional Limits  Orientation Level: Oriented X4       Treatments:  Therapeutic Exercise  Therapeutic Exercise Performed: Yes  Therapeutic Exercise Activity 1: Seated BLE AP, LAQ, Hip Abd 1x10 reps. Pt educated on supine/seated BLE exs. Cued for technques.    Therapeutic Activity  Therapeutic Activity 1: Pt tolerated seated EOB 12 mins 1st trial and 12 mins 2nd trial with assist varied from SBA-Jeanne.  Therapeutic Activity 2: Pt tolerated static standing for 10 secs with MaxAx2 with BUE supported on walker         Bed Mobility 1  Bed Mobility 1: Supine to sitting, Sitting to supine  Level of Assistance 1: Maximum  assistance  Bed Mobility Comments 1: HOB elevated, bedrail  Bed Mobility 2  Bed Mobility  2: Supine to sitting, Sitting to supine  Level of Assistance 2:  (ModAx2)  Bed Mobility Comments 2: HOB elevated, bedrail  Bed Mobility 3  Bed Mobility 3: Scooting (x2 trials)  Level of Assistance 3:  (Depx2)  Bed Mobility 4  Bed Mobility 4: Rolling right, Rolling left  Level of Assistance 4: Maximum assistance    Transfer 1  Technique 1: Sit to stand, Stand to sit  Transfer Device 1: Walker  Transfer Level of Assistance 1:  (MaxAx2 with B knee/foor block required)  Trials/Comments 1: Cues for safe hand placement, sequencing and walker safety    Outcome Measures:  [facility] Basic Mobility  Turning from your back to your side while in a flat bed without using bedrails: Total  Moving from lying on your back to sitting on the side of a flat bed without using bedrails: Total  Moving to and from bed to chair (including a wheelchair): Total  Standing up from a chair using your arms (e.g. wheelchair or bedside chair): Total  To walk in hospital room: Total  Climbing 3-5 steps with railing: Total  Basic Mobility - Total Score: 6    Education Documentation  No documentation found.  Education Comments  No comments found.        OP EDUCATION:  Education  Individual(s) Educated: Patient  Education Provided:  (Pt education on PT POC, use of walker, safe transfer techniqus, seated/supine LE exs and fall precevntion.)    Encounter Problems       Encounter Problems (Active)       Balance       patient will complete static (Cgx1) and dynamic (MINx1) sitting balance activities using UE support as needed without acute LOB in order to maintain midline (Progressing)       Start:  10/04/23    Expected End:  10/18/23               Mobility       patient will ambulate >/=3 ft in order to complete functional tranfers without acute LOB with MAXx2 assist.  (Progressing)       Start:  10/04/23    Expected End:  10/18/23               Mobility       patient  will participate in BLE there-ex in order to assist in improving strength and to assist with the completion of functional mobility tasks  (Progressing)       Start:  10/04/23    Expected End:  10/18/23               Pain - Adult          Transfers       Patient will complete STS with MODx2 without acute LOB  (Progressing)       Start:  10/04/23    Expected End:  10/18/23            patient will complete supine<->sit with MODx2 with bedrail as needed without acute LOB  (Progressing)       Start:  10/04/23    Expected End:  10/18/23

## 2023-10-09 NOTE — PROGRESS NOTES
Occupational Therapy    Occupational Therapy Treatment    Name: Bisi Mckay  MRN: [id number]  : 1960  Date: 10/09/23  Time Calculation  Start Time: 1542  Stop Time: 1606  Time Calculation (min): 24 min    Assessment:  OT Assessment: Pt presents with decreased strength and AROM BUEs/LEs, decreased activity tolerance and reduced balance resulting in increased need for assist with ADLs, transfers and functional mobility. Pt would benefit from skilled OT to address deficits, maximize independence and facilitate return to PLOF.  Prognosis: Good  Barriers to Discharge:  (home alone)  Evaluation/Treatment Tolerance: Patient tolerated treatment well, Patient limited by fatigue  Plan:  Treatment Interventions: ADL retraining, Functional transfer training, UE strengthening/ROM, Endurance training, Compensatory technique education  OT Frequency: 4 times per week  OT Discharge Recommendations: High intensity level of continued care    Subjective   General:  OT Last Visit  OT Received On: 10/09/23  General  Reason for Referral: Weakness + fall, acute hypoxic respiratory failure  Past Medical History Relevant to Rehab: primary biliary cirrhosis, HTN, GERD, DM, breast cancer, OA, RA?, IBS  Family/Caregiver Present: No  Co-Treatment: PT  Co-Treatment Reason: cotx with PT for max safety with functional transfers/mobility  Prior to Session Communication: Bedside nurse  Patient Position Received: Bed, 3 rail up (PT present)  Preferred Learning Style: verbal  General Comment: Pt received lying in bed on arrival with PT present. Agreeable to participate despite reported fatigue.  Vitals:  Vital Signs  Heart Rate: 107  Heart Rate Source: Monitor  SpO2: 95 %  BP: 152/82 (supine at brandon of session)  Patient Position: Lying  Pain Assessment:  Pain Assessment  Pain Assessment: 0-10  Pain Score: 0 - No pain     Objective   Activities of Daily Living: Grooming  Grooming Level of Assistance: Minimum assistance (assist for balance while  perfoming grooming task seated at EOB)  Grooming Where Assessed: Edge of bed  Grooming Comments: Pt performed face hygiene seated at EOB with hand over hand assist d/t limited AROM R UE and faitgue.    Functional Standing Tolerance:  Functional Standing Tolerance  Time: 20 seconds  Functional Standing Tolerance Comments:  (Pt stood with Depx2. PT providing assist anteriorly + blocking B knees while this OT assisting posteriorly via draw sheet at hips.)  Bed Mobility/Transfers: Bed Mobility  Bed Mobility: Yes  Bed Mobility 1  Bed Mobility 1: Supine to sitting  Level of Assistance 1: Moderate assistance (x2)  Bed Mobility Comments 1: Cued for sequencing and hand placement, HOB elevated  Bed Mobility 2  Bed Mobility  2: Sitting to supine  Level of Assistance 2: Moderate assistance (x2)  Bed Mobility 3  Bed Mobility 3: Rolling right  Level of Assistance 3: Maximum assistance  Bed Mobility Comments 3: verbal and tactile cues for LE positioning to assist with roll  Bed Mobility 4  Bed Mobility 4: Rolling left  Level of Assistance 4: Maximum assistance  Bed Mobility Comments 4: verbal and tactile cues for LE positioning to assist with roll  Bed Mobility 5  Bed Mobility 5: Scooting  Level of Assistance 5: Dependent (x2 to scoot up in bed while supine)    Transfers  Transfer: Yes  Transfer 1  Transfer From 1: Sit to  Transfer to 1: Stand  Technique 1: Sit to stand  Transfer Device 1: Walker  Transfer Level of Assistance 1: Maximum assistance, +2  Trials/Comments 1: cued for rocking to gain momentum and anterior WS. Pt able to achieve stand with increased effort, cued for hip & trunk extension. B knee block.    Therapy/Activity: Therapeutic Activity  Therapeutic Activity Performed: Yes  Therapeutic Activity 1: Pt tolerated sitting at EOB x10 minutes with varying assist of min-max Ax1. Verbal and tactile cues for anterior WS. Pt required increased assist to sustain sitting balance as she  fatigued.    Strength:  Strength  Strength Comments: B UE 2-/5 (assess in supine)       Outcome Measures:  [facility] Daily Activity  Putting on and taking off regular lower body clothing: Total  Bathing (including washing, rinsing, drying): A lot  Putting on and taking off regular upper body clothing: A lot  Toileting, which includes using toilet, bedpan or urinal: Total  Taking care of personal grooming such as brushing teeth: A little  Eating Meals: A little  Daily Activity - Total Score: 12      Education Documentation  Body Mechanics, taught by Anna Calderon OT at 10/9/2023  4:44 PM.  Learner: Patient  Readiness: Acceptance  Method: Explanation, Demonstration  Response: Verbalizes Understanding, Needs Reinforcement    Precautions, taught by Anna Calderon OT at 10/9/2023  4:44 PM.  Learner: Patient  Readiness: Acceptance  Method: Explanation, Demonstration  Response: Verbalizes Understanding, Needs Reinforcement    ADL Training, taught by Anna Calderon OT at 10/9/2023  4:44 PM.  Learner: Patient  Readiness: Acceptance  Method: Explanation, Demonstration  Response: Verbalizes Understanding, Needs Reinforcement    Education Comments  No comments found.      Goals:  Encounter Problems       Encounter Problems (Active)       ADLs       Patient will perform UB and LB bathing with minimal assist  level of assistance. (Progressing)       Start:  10/05/23    Expected End:  10/19/23            Patient with complete upper body dressing with minimal assist  level of assistance donning and doffing all UE clothes with PRN adaptive equipment while edge of bed  (Progressing)       Start:  10/05/23    Expected End:  10/19/23            Patient with complete lower body dressing with minimal assist  level of assistance donning and doffing all LE clothes  with PRN adaptive equipment (Progressing)       Start:  10/05/23    Expected End:  10/19/23            Patient will complete daily grooming tasks brushing teeth and washing  face/hair with minimal assist  level of assistance and PRN adaptive equipment while edge of bed . (Progressing)       Start:  10/05/23    Expected End:  10/19/23            Patient will complete toileting including hygiene clothing management/hygiene with minimal assist  level of assistance and bedside commode. (Progressing)       Start:  10/05/23    Expected End:  10/19/23               BALANCE       Patientt will maintain static standing balance during ADL task with minimal assist  level of assistance drop down in order to demonstrate decreased risk of falling and improved postural control. (Progressing)       Start:  10/05/23    Expected End:  10/19/23               Balance       patient will complete static (Cgx1) and dynamic (MINx1) sitting balance activities using UE support as needed without acute LOB in order to maintain midline (Progressing)       Start:  10/04/23    Expected End:  10/18/23               Mobility       patient will ambulate >/=3 ft in order to complete functional tranfers without acute LOB with MAXx2 assist.  (Progressing)       Start:  10/04/23    Expected End:  10/18/23               Mobility       patient will participate in BLE there-ex in order to assist in improving strength and to assist with the completion of functional mobility tasks  (Progressing)       Start:  10/04/23    Expected End:  10/18/23               TRANSFERS       Patient will perform bed mobility minimal assist  level of assistance and bed rails in order to improve safety and independence with mobility (Progressing)       Start:  10/05/23    Expected End:  10/19/23            Patient will complete functional transfer to sit<>stand, toilet, chair with least restrictive device with minimal assist  level of assistance. (Progressing)       Start:  10/05/23    Expected End:  10/19/23               Transfers       Patient will complete STS with MODx2 without acute LOB  (Progressing)       Start:  10/04/23    Expected End:   10/18/23            patient will complete supine<->sit with MODx2 with bedrail as needed without acute LOB  (Progressing)       Start:  10/04/23    Expected End:  10/18/23

## 2023-10-10 LAB
ALBUMIN SERPL BCP-MCNC: 2.3 G/DL (ref 3.4–5)
ALP SERPL-CCNC: 75 U/L (ref 33–136)
ALT SERPL W P-5'-P-CCNC: 73 U/L (ref 7–45)
ANION GAP SERPL CALC-SCNC: 14 MMOL/L (ref 10–20)
AST SERPL W P-5'-P-CCNC: 50 U/L (ref 9–39)
BASOPHILS # BLD MANUAL: 0 X10*3/UL (ref 0–0.1)
BASOPHILS NFR BLD MANUAL: 0 %
BILIRUB SERPL-MCNC: 0.5 MG/DL (ref 0–1.2)
BUN SERPL-MCNC: 37 MG/DL (ref 6–23)
BURR CELLS BLD QL SMEAR: ABNORMAL
CALCIUM SERPL-MCNC: 7.7 MG/DL (ref 8.6–10.6)
CHLORIDE SERPL-SCNC: 106 MMOL/L (ref 98–107)
CK SERPL-CCNC: 308 U/L (ref 0–215)
CO2 SERPL-SCNC: 25 MMOL/L (ref 21–32)
CREAT SERPL-MCNC: 0.86 MG/DL (ref 0.5–1.05)
EOSINOPHIL # BLD MANUAL: 0 X10*3/UL (ref 0–0.7)
EOSINOPHIL NFR BLD MANUAL: 0 %
ERYTHROCYTE [DISTWIDTH] IN BLOOD BY AUTOMATED COUNT: 13.8 % (ref 11.5–14.5)
GFR SERPL CREATININE-BSD FRML MDRD: 75 ML/MIN/1.73M*2
GLUCOSE BLD MANUAL STRIP-MCNC: 144 MG/DL (ref 74–99)
GLUCOSE BLD MANUAL STRIP-MCNC: 172 MG/DL (ref 74–99)
GLUCOSE BLD MANUAL STRIP-MCNC: 180 MG/DL (ref 74–99)
GLUCOSE BLD MANUAL STRIP-MCNC: 216 MG/DL (ref 74–99)
GLUCOSE BLD MANUAL STRIP-MCNC: 248 MG/DL (ref 74–99)
GLUCOSE SERPL-MCNC: 253 MG/DL (ref 74–99)
HCT VFR BLD AUTO: 34.7 % (ref 36–46)
HGB BLD-MCNC: 10.4 G/DL (ref 12–16)
IMM GRANULOCYTES # BLD AUTO: 0.09 X10*3/UL (ref 0–0.7)
IMM GRANULOCYTES NFR BLD AUTO: 1.4 % (ref 0–0.9)
LYMPHOCYTES # BLD MANUAL: 0.81 X10*3/UL (ref 1.2–4.8)
LYMPHOCYTES NFR BLD MANUAL: 12.3 %
MAGNESIUM SERPL-MCNC: 2.06 MG/DL (ref 1.6–2.4)
MCH RBC QN AUTO: 29.1 PG (ref 26–34)
MCHC RBC AUTO-ENTMCNC: 30 G/DL (ref 32–36)
MCV RBC AUTO: 97 FL (ref 80–100)
MONOCYTES # BLD MANUAL: 0 X10*3/UL (ref 0.1–1)
MONOCYTES NFR BLD MANUAL: 0 %
NEUTS SEG # BLD MANUAL: 5.79 X10*3/UL (ref 1.2–7)
NEUTS SEG NFR BLD MANUAL: 87.7 %
NRBC BLD-RTO: 0 /100 WBCS (ref 0–0)
PLATELET # BLD AUTO: 238 X10*3/UL (ref 150–450)
PMV BLD AUTO: 8.9 FL (ref 7.5–11.5)
POLYCHROMASIA BLD QL SMEAR: ABNORMAL
POTASSIUM SERPL-SCNC: 5 MMOL/L (ref 3.5–5.3)
PROT SERPL-MCNC: 5.1 G/DL (ref 6.4–8.2)
RBC # BLD AUTO: 3.57 X10*6/UL (ref 4–5.2)
RBC MORPH BLD: ABNORMAL
SODIUM SERPL-SCNC: 140 MMOL/L (ref 136–145)
TOTAL CELLS COUNTED BLD: 114
TSH SERPL-ACNC: 0.7 MIU/L (ref 0.44–3.98)
WBC # BLD AUTO: 6.6 X10*3/UL (ref 4.4–11.3)

## 2023-10-10 PROCEDURE — 82947 ASSAY GLUCOSE BLOOD QUANT: CPT

## 2023-10-10 PROCEDURE — 2500000004 HC RX 250 GENERAL PHARMACY W/ HCPCS (ALT 636 FOR OP/ED): Performed by: STUDENT IN AN ORGANIZED HEALTH CARE EDUCATION/TRAINING PROGRAM

## 2023-10-10 PROCEDURE — 85027 COMPLETE CBC AUTOMATED: CPT | Performed by: STUDENT IN AN ORGANIZED HEALTH CARE EDUCATION/TRAINING PROGRAM

## 2023-10-10 PROCEDURE — 2500000004 HC RX 250 GENERAL PHARMACY W/ HCPCS (ALT 636 FOR OP/ED)

## 2023-10-10 PROCEDURE — 2580000001 HC RX 258 IV SOLUTIONS

## 2023-10-10 PROCEDURE — 96372 THER/PROPH/DIAG INJ SC/IM: CPT

## 2023-10-10 PROCEDURE — 85007 BL SMEAR W/DIFF WBC COUNT: CPT | Performed by: STUDENT IN AN ORGANIZED HEALTH CARE EDUCATION/TRAINING PROGRAM

## 2023-10-10 PROCEDURE — 1100000001 HC PRIVATE ROOM DAILY

## 2023-10-10 PROCEDURE — 36415 COLL VENOUS BLD VENIPUNCTURE: CPT | Performed by: STUDENT IN AN ORGANIZED HEALTH CARE EDUCATION/TRAINING PROGRAM

## 2023-10-10 PROCEDURE — 2500000001 HC RX 250 WO HCPCS SELF ADMINISTERED DRUGS (ALT 637 FOR MEDICARE OP)

## 2023-10-10 PROCEDURE — 36415 COLL VENOUS BLD VENIPUNCTURE: CPT

## 2023-10-10 PROCEDURE — 80053 COMPREHEN METABOLIC PANEL: CPT

## 2023-10-10 PROCEDURE — 2500000002 HC RX 250 W HCPCS SELF ADMINISTERED DRUGS (ALT 637 FOR MEDICARE OP, ALT 636 FOR OP/ED)

## 2023-10-10 PROCEDURE — 92526 ORAL FUNCTION THERAPY: CPT | Mod: GN | Performed by: SPEECH-LANGUAGE PATHOLOGIST

## 2023-10-10 PROCEDURE — 82550 ASSAY OF CK (CPK): CPT

## 2023-10-10 PROCEDURE — 83735 ASSAY OF MAGNESIUM: CPT

## 2023-10-10 PROCEDURE — 2500000001 HC RX 250 WO HCPCS SELF ADMINISTERED DRUGS (ALT 637 FOR MEDICARE OP): Performed by: STUDENT IN AN ORGANIZED HEALTH CARE EDUCATION/TRAINING PROGRAM

## 2023-10-10 PROCEDURE — 99232 SBSQ HOSP IP/OBS MODERATE 35: CPT

## 2023-10-10 PROCEDURE — 84443 ASSAY THYROID STIM HORMONE: CPT

## 2023-10-10 PROCEDURE — 71250 CT THORAX DX C-: CPT | Performed by: RADIOLOGY

## 2023-10-10 RX ORDER — CALCIUM CARBONATE 200(500)MG
500 TABLET,CHEWABLE ORAL DAILY
Status: DISCONTINUED | OUTPATIENT
Start: 2023-10-10 | End: 2023-10-14 | Stop reason: HOSPADM

## 2023-10-10 RX ORDER — CHOLECALCIFEROL (VITAMIN D3) 25 MCG
25 TABLET ORAL DAILY
Status: DISCONTINUED | OUTPATIENT
Start: 2023-10-10 | End: 2023-10-14 | Stop reason: HOSPADM

## 2023-10-10 RX ORDER — HEPARIN SODIUM 5000 [USP'U]/ML
5000 INJECTION, SOLUTION INTRAVENOUS; SUBCUTANEOUS ONCE
Status: COMPLETED | OUTPATIENT
Start: 2023-10-10 | End: 2023-10-10

## 2023-10-10 RX ADMIN — HEPARIN SODIUM 5000 UNITS: 5000 INJECTION INTRAVENOUS; SUBCUTANEOUS at 17:13

## 2023-10-10 RX ADMIN — CARVEDILOL 3.12 MG: 3.12 TABLET, FILM COATED ORAL at 20:17

## 2023-10-10 RX ADMIN — Medication 25 MCG: at 13:11

## 2023-10-10 RX ADMIN — METHYLPREDNISOLONE SODIUM SUCCINATE 60 MG: 40 INJECTION, POWDER, FOR SOLUTION INTRAMUSCULAR; INTRAVENOUS at 20:18

## 2023-10-10 RX ADMIN — GABAPENTIN 900 MG: 250 SOLUTION ORAL at 20:17

## 2023-10-10 RX ADMIN — CALCIUM CARBONATE (ANTACID) CHEW TAB 500 MG 500 MG: 500 CHEW TAB at 13:11

## 2023-10-10 RX ADMIN — PANTOPRAZOLE SODIUM 40 MG: 40 TABLET, DELAYED RELEASE ORAL at 09:37

## 2023-10-10 RX ADMIN — CARVEDILOL 3.12 MG: 3.12 TABLET, FILM COATED ORAL at 09:37

## 2023-10-10 RX ADMIN — SODIUM CHLORIDE, POTASSIUM CHLORIDE, SODIUM LACTATE AND CALCIUM CHLORIDE 500 ML: 600; 310; 30; 20 INJECTION, SOLUTION INTRAVENOUS at 17:59

## 2023-10-10 RX ADMIN — INSULIN LISPRO 2 UNITS: 100 INJECTION, SOLUTION INTRAVENOUS; SUBCUTANEOUS at 17:17

## 2023-10-10 RX ADMIN — INSULIN HUMAN 5 UNITS: 100 INJECTION, SUSPENSION SUBCUTANEOUS at 17:18

## 2023-10-10 RX ADMIN — GABAPENTIN 300 MG: 250 SOLUTION ORAL at 09:36

## 2023-10-10 ASSESSMENT — COGNITIVE AND FUNCTIONAL STATUS - GENERAL
MOVING FROM LYING ON BACK TO SITTING ON SIDE OF FLAT BED WITH BEDRAILS: TOTAL
MOVING TO AND FROM BED TO CHAIR: TOTAL
CLIMB 3 TO 5 STEPS WITH RAILING: TOTAL
DAILY ACTIVITIY SCORE: 11
WALKING IN HOSPITAL ROOM: TOTAL
EATING MEALS: A LITTLE
PERSONAL GROOMING: A LOT
MOBILITY SCORE: 6
TOILETING: TOTAL
STANDING UP FROM CHAIR USING ARMS: TOTAL
DRESSING REGULAR UPPER BODY CLOTHING: TOTAL
TURNING FROM BACK TO SIDE WHILE IN FLAT BAD: TOTAL
HELP NEEDED FOR BATHING: A LOT
DRESSING REGULAR LOWER BODY CLOTHING: A LOT

## 2023-10-10 ASSESSMENT — PAIN SCALES - GENERAL: PAINLEVEL_OUTOF10: 0 - NO PAIN

## 2023-10-10 ASSESSMENT — PAIN - FUNCTIONAL ASSESSMENT: PAIN_FUNCTIONAL_ASSESSMENT: 0-10

## 2023-10-10 NOTE — PROGRESS NOTES
Speech-Language Pathology    Inpatient  Speech-Language Pathology Treatment     Patient Name: Bisi Mckay  MRN: [id number]  Today's Date: 10/10/2023  Time Calculation  Start Time: 1100  Stop Time: 1120  Time Calculation (min): 20 min         Current Problem:   Patient Active Problem List   Diagnosis    Acute hypoxic respiratory failure (CMS/HCC)    Muscle weakness    Diabetes mellitus, type 2 (CMS/HCC)         SLP Assessment:  SLP TX Intervention Outcome: Making Progress Towards Goals  SLP Assessment Results: SLP  follow-up this morning for ongoing assessment. However, the pt is NPO awaiting muscle biopsy. Majority of session was dedicated to: oral care, diet tolerance of her current modified diet, and review of plan for repeat MBS prior to advancing oral diet.   Prognosis: Good  Treatment Tolerance: Patient tolerated treatment well  Medical Staff Made Aware: Yes  Education Provided: Yes       Plan:  Tentatively plan for MBS (baring NPO for additional procedures) tomorrow AM. Additional goal and recs to follow. The pt was grateful and agreeable to plan.   Inpatient/Swing Bed or Outpatient: Inpatient  SLP TX Plan: Continue Plan of Care  SLP Plan:  (MBS tentatively for tomorrow 10/11)  SLP Frequency: 2x per week  Duration: 2 weeks  SLP Discharge Recommendations:  (TBD)  Discussed POC: Patient  Discussed Risks/Benefits: Yes  Patient/Caregiver Agreeable: Yes      Subjective   Current Problem:  Pt resting in bed. Alert and pleasant during visit. Bisi reports progressive improvement in voice, swallowing and UE ROM/strength. Today she brushed her hair and teeth with minimal assistance. She reports sitting EOB and standing up with walker. Vocal quality was WNL for conversation.     Most Recent Visit:  SLP Received On: 10/10/23    General Visit Information:   Reason for Referral: ongoing assessment; strategy review  Referred By: [facility] Team  Past Medical History Relevant to Rehab: primary biliary cirrhosis, HTN, GERD, DM,  breast cancer, OA, RA?, IBS  Patient Seen During This Visit: Yes  Arrival: Family/caregiver present        Objective       Therapeutic Swallow:  Therapeutic Swallow Intervention : Did not directly address dysphagia tx. Set up oral care and pt was able to brush teeth independently  Oral-motor function grossly intact            Encounter Problems       Encounter Problems (Active)       Swallowing       LTG - Patient will tolerate the least restrictive diet without overt difficulty by time of discharge. (Progressing)       Start:  10/05/23    Expected End:  10/19/23            STG - Patient will implement safe swallowing strategies to reduce risk of aspiration given caregiver assistance/cueing as needed.  (Progressing)       Start:  10/05/23    Expected End:  10/19/23       progressing      Goal note on 10/10/23 1243 by BENTON Hall       Via demonstration or recall to 100% accuracy independently.    Pt NPO today- did not directly address this goal              STG - Patient will participate in a Modified Barium Swallow Study. (Met)       Start:  10/05/23    Expected End:  10/19/23    Resolved:  10/06/23    Will schedule for 10/6              Education:  Adult Outpatient Education  Individual(s) Educated: Patient  Patient/Caregiver Demonstrated Understanding: yes  Plan of Care Discussed and Agreed Upon: yes

## 2023-10-10 NOTE — SIGNIFICANT EVENT
Bisi Mckay is a 63-year-old female with a history of T2DM, PBC, and breast cancer who was admitted to the MICU on 10/1/2023 for acute hypoxemic respiratory failure. Neurology is following for evaluation of proximal weakness. On exam, patient was noted to have bilateral symmetric proximal muscle weakness. CK peak 11k. EMG showed electrophysiologic evidence of a myopathy with fibrillation potentials consistent with a necrotizing myopathy. Currently patient is on IVMP 60 mg IV daily along with one dose IVIG on 10/6 as per rheumatology. MRI whole spine w/wo contrast was done on 10/9/2023 which did not show any evidence of spinal cord mets or lesions. Pending muscle biopsy. Anti-HMG-CoA Reductase antibody pending.      Recommendations:  - Agree with Rheumatology's plan for treatment.   - Please request follow up with neuromuscular as outpatient     Case was dicussed with general neurology attending, Dr. Joaquin Pritchett.     Neurology team will signoff.  Please reach out with any questions or concerns. General neurology team pager: [phone]     Camryn Jain MD  Neurology Resident PGY3

## 2023-10-10 NOTE — PROGRESS NOTES
"Bisi Mckay is a 63 y.o. female on day 9 of admission presenting with Muscle weakness.    Subjective   Patient feels well today. Feels like strength in arms is improving, but not in legs. Only concern this morning was that she has the urge to urinate and has not been able to. Realized that bladder device was not turned back on after MRI last evening.    Objective     Physical Exam  Vitals reviewed.   Constitutional:       Comments: Laying comfortably in bed   HENT:      Head: Normocephalic and atraumatic.      Mouth/Throat:      Mouth: Mucous membranes are moist.   Eyes:      Extraocular Movements: Extraocular movements intact.   Pulmonary:      Effort: Pulmonary effort is normal.      Breath sounds: No stridor. No wheezing, rhonchi or rales.   Abdominal:      General: Abdomen is flat.      Palpations: Abdomen is soft.      Tenderness: There is no abdominal tenderness.   Musculoskeletal:         General: No swelling or deformity.      Cervical back: Normal range of motion.   Skin:     General: Skin is warm.      Findings: Bruising present. No rash.   Neurological:      Mental Status: She is alert and oriented to person, place, and time.      Cranial Nerves: Cranial nerves 2-12 are intact. No dysarthria or facial asymmetry.      Sensory: No sensory deficit.      Motor: No tremor.      Comments: Stronger in right leg over left. Upper extremity strength greatly improved, can give resistance against force.       Last Recorded Vitals  Blood pressure 135/76, pulse 97, temperature 36.2 °C (97.2 °F), resp. rate 17, height 1.651 m (5' 5\"), weight 115 kg (252 lb 6.8 oz), SpO2 94 %.  Intake/Output last 3 Shifts:  I/O last 3 completed shifts:  In: 1200 (10.5 mL/kg) [P.O.:1200]  Out: 2675 (23.4 mL/kg) [Urine:2675 (0.6 mL/kg/hr)]  Weight: 114.5 kg     Relevant Results    Current Facility-Administered Medications:     calcium carbonate (Tums) chewable tablet 500 mg, 500 mg, oral, Daily, Lesly Parra MD    carvedilol " (Coreg) tablet 3.125 mg, 3.125 mg, oral, BID, Ольга Saleh MD, 3.125 mg at 10/10/23 0937    cholecalciferol (Vitamin D-3) tablet 25 mcg, 25 mcg, oral, Daily, Lesly Parra MD    dextrose 10 % in water (D10W) infusion, 0.3 g/kg/hr, intravenous, Once PRN, Lesly Parra MD    dextrose 50 % injection 25 g, 25 g, intravenous, q15 min PRN, Lesly Parra MD    flu vacc [id number] (65yr up)-PF (Fluzone High-Dose Quad) syringe 0.7 mL, 0.7 mL, intramuscular, During hospitalization, Alysa Castro MD    gabapentin (Neurontin) solution 300 mg, 300 mg, oral, Daily before breakfast, Alysa Castro MD, 300 mg at 10/10/23 0936    gabapentin (Neurontin) solution 900 mg, 900 mg, oral, Nightly, Alysa Castro MD, 900 mg at 10/09/23 2255    glucagon (Glucagen) injection 1 mg, 1 mg, intramuscular, q15 min PRN, Lesly Parra MD    insulin lispro (HumaLOG) injection 0-10 Units, 0-10 Units, subcutaneous, TID with meals, Lesly Parra MD, 2 Units at 10/09/23 1841    insulin NPH (Isophane) (HumuLIN N,NovoLIN N) injection 5 Units, 5 Units, subcutaneous, BID AC, Lesly Parra MD, 5 Units at 10/09/23 1842    methylPREDNISolone sod succinate (PF) (SOLU-Medrol) 40 mg/mL injection 60 mg, 60 mg, intravenous, Daily, Eber Jimenez MD, 60 mg at 10/09/23 2256    pantoprazole (ProtoNix) EC tablet 40 mg, 40 mg, oral, Daily before breakfast, Lesly Parra MD, 40 mg at 10/10/23 0937    perflutren protein A microsphere (Optison) injection 0.5 mL, 0.5 mL, intravenous, Once in imaging, Lesly Parra MD    pneumococcal conjugate (Prevnar 13) 0.5 mL vaccine 0.5 mL, 0.5 mL, intramuscular, During hospitalization, Alysa Castro MD    sennosides-docusate sodium (Ramlia-Colace) 8.6-50 mg per tablet 1 tablet, 1 tablet, oral, Nightly, Alysa Castro MD, 1 tablet at 10/07/23 2040    sulfur hexafluoride microsphr (Lumason) injection 24.28 mg, 2 mL, intravenous, Once in imaging, Lesly  MD Fidel     Results for orders placed or performed during the hospital encounter of 10/01/23 (from the past 24 hour(s))   POCT GLUCOSE   Result Value Ref Range    POCT Glucose 185 (H) 74 - 99 mg/dL   POCT GLUCOSE   Result Value Ref Range    POCT Glucose 155 (H) 74 - 99 mg/dL   POCT GLUCOSE   Result Value Ref Range    POCT Glucose 144 (H) 74 - 99 mg/dL   Magnesium   Result Value Ref Range    Magnesium 2.06 1.60 - 2.40 mg/dL   Comprehensive metabolic panel   Result Value Ref Range    Glucose 253 (H) 74 - 99 mg/dL    Sodium 140 136 - 145 mmol/L    Potassium 5.0 3.5 - 5.3 mmol/L    Chloride 106 98 - 107 mmol/L    Bicarbonate 25 21 - 32 mmol/L    Anion Gap 14 10 - 20 mmol/L    Urea Nitrogen 37 (H) 6 - 23 mg/dL    Creatinine 0.86 0.50 - 1.05 mg/dL    eGFR 75 >60 mL/min/1.73m*2    Calcium 7.7 (L) 8.6 - 10.6 mg/dL    Albumin 2.3 (L) 3.4 - 5.0 g/dL    Alkaline Phosphatase 75 33 - 136 U/L    Total Protein 5.1 (L) 6.4 - 8.2 g/dL    AST 50 (H) 9 - 39 U/L    Bilirubin, Total 0.5 0.0 - 1.2 mg/dL    ALT 73 (H) 7 - 45 U/L   Creatine Kinase   Result Value Ref Range    Creatine Kinase 308 (H) 0 - 215 U/L   CBC and Auto Differential   Result Value Ref Range    WBC 6.6 4.4 - 11.3 x10*3/uL    nRBC 0.0 0.0 - 0.0 /100 WBCs    RBC 3.57 (L) 4.00 - 5.20 x10*6/uL    Hemoglobin 10.4 (L) 12.0 - 16.0 g/dL    Hematocrit 34.7 (L) 36.0 - 46.0 %    MCV 97 80 - 100 fL    MCH 29.1 26.0 - 34.0 pg    MCHC 30.0 (L) 32.0 - 36.0 g/dL    RDW 13.8 11.5 - 14.5 %    Platelets 238 150 - 450 x10*3/uL    MPV 8.9 7.5 - 11.5 fL    Immature Granulocytes %, Automated 1.4 (H) 0.0 - 0.9 %    Immature Granulocytes Absolute, Automated 0.09 0.00 - 0.70 x10*3/uL   Manual Differential   Result Value Ref Range    Neutrophils %, Manual 87.7 40.0 - 80.0 %    Lymphocytes %, Manual 12.3 13.0 - 44.0 %    Monocytes %, Manual 0.0 2.0 - 10.0 %    Eosinophils %, Manual 0.0 0.0 - 6.0 %    Basophils %, Manual 0.0 0.0 - 2.0 %    Seg Neutrophils Absolute, Manual 5.79 1.20 - 7.00  x10*3/uL    Lymphocytes Absolute, Manual 0.81 (L) 1.20 - 4.80 x10*3/uL    Monocytes Absolute, Manual 0.00 (L) 0.10 - 1.00 x10*3/uL    Eosinophils Absolute, Manual 0.00 0.00 - 0.70 x10*3/uL    Basophils Absolute, Manual 0.00 0.00 - 0.10 x10*3/uL    Total Cells Counted 114     RBC Morphology See Below     Polychromasia Mild     Charbel Cells Few    POCT GLUCOSE   Result Value Ref Range    POCT Glucose 248 (H) 74 - 99 mg/dL       MRI Spine 10/9  IMPRESSION:  Cervical spine:  1. Mild multilevel degenerative changes of the cervical spine most  notable for moderate central canal and bilateral neural foraminal  stenosis at C5-C6. No abnormal cord signal at this level.  2. There is a heterogeneous enhancing 5.7 cm right thyroid mass as  described above with mild compression of the adjacent trachea and  posterior displacement of surrounding vasculature. The vessels are  patent. Recommend correlation with thyroid ultrasound.      Thoracic spine:  1. No significant central canal or neural foraminal stenosis.  2. 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  Recommend correlation with outside imaging to document stability. If  none available six-month follow-up scan can be obtained for further  evaluation.      Lumbar spine:  1. Multilevel degenerative changes without high-grade central canal  or neural foraminal stenosis.  2. Small left pleural effusion with adjacent opacity, likely  atelectasis.          Assessment/Plan   Principal Problem:    Muscle weakness  Active Problems:    Acute hypoxic respiratory failure (CMS/HCC)    Diabetes mellitus, type 2 (CMS/HCC)    64 yo F w/ PMHx primary biliary cirrhosis, HTN, GERD, DM, hx of breast cancer s/p lumpectomy and radiation, and osteoarthritis presented to [place] on 9/25/23 for weakness and falls transferred to [facility] 10/1/23 for  further workup of acute hypoxic respiratory failure suspected s/t muscular weakness and rheumatologic/neurologic work-up.    Patient reports she first started noticing weakness following a trip to [place]. On the 23rd of September she had a fall and later on the 25th she was found down after hours of being on the ground next to her bed, without losing consciousness. She was admitted to medicine for rhabdo (Cr 1.75, CK 2880). Hospital course c/b UTI, worsening SIKP 2/2 rhabdo (CK peaked at 11.773). On the 28th developed respiratory failure and got intubated and transferred to the ICU. Patient extubated 10/3. Transferred to floor 10/4 for further work-up of weakness.     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 Got first dose on IVIG 10/6 and started solu-medrol 60 IV daily per rheum. 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 Plan for IR-guided muscle biopsy 10/11.    Updates 10/10:  -MRI spine completed last night most notable for T11 intraosseous hemangioma with a T1 hypointense/T2 hyperintense enhancing mass along its posterior aspect, likely representing atypical presentation of an intraosseous hemangioma. Recommend six-month follow-up scan can be obtained for further evaluation.  -SLP to evaluate again today  -NPO at [facility] for muscle biopsy tomorrow per IR  -patient will need PJP ppx, but has bactrim allergy. Will check G6PD and begin dapsone  -began Vit D and Ca supplementation per rheum  -will also order HR-CT and CT A/P per rheum  -extended myositis panel, HMG-CoAR Ab pending    #Proximal Muscle Weakness  #AHRF requiring intubation, resolved  #Dysphagia  -MRI brain 9/10 with minimal chronic microvascular angiopathy. No acute intracranial abnormality.   -CT head and C-spine 9/25 no acute abnormality.   -CXR unremarkable, respiratory panel negative, MRSA nares negative  -Anti-Ach, Lyme 2 step, NORMA,  aldolase, ANCA, AMA, RF, anti-CCP, Anti-SSA/SSB, Anti-Lamar negative  -ESR wnl, CRP 13 (high)  -TSH low, fT4 wnl, anti-TPO ab wnl  -B12: >2000  -C3 132, C4 56 (high), Ceruloplasmin 37 (wnl)  -Cryoglobulin neg   -AM cortisol: wnl   -LDH  >750  -RPR, HIV negative, hepatitis panel negative  -CSF viral panel negative, normal cell count and diff, normal protein, negative cultures  -Blood cultures and fungal cultures negative  -pending HMG-CoAR Ab, extended myositis panel, TB-spot  -Neurology consulted, appreciate recs  -EMG 10/6 demonstrates abnormal RUE/RLE w/ electrophysiologic evidence of a 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 Recommend six-month follow-up scan can be obtained for further evaluation.  -Rheumatology consulted, appreciate recs   -s/p IVIG 10/6   -Solu-medrol 60 mg IV daily  -IR-guided muscle biopsy tomorrow  -patient will need PJP ppx, but has bactrim allergy. Will check G6PD and begin dapsone  -began Vit D and Ca supplementation per rheum  -will also order HR-CT and CT A/P per rheum  -daily trend for CPK, LFT's  -SLP consulted, MBS completed 10/6: minced, moist diet and mod thickened liquid  -NIF ordered    #Tachycardia  -HR 110s-120s starting 10/4, normotensive-hypertensive  -ECG NSR  -TTE 10/5 w/ normal left ventricular systolic function, EF 65-70%. Slightly elevated RVSP  -TSH and orthostats pending    #Steroid-induced Hyperglycemia  #T2DM  -A1c 10/8 6.1  -hold home metformin  -mod SSI  -added NPH 5 BID 10/8  -hypoglycemia protocol    #SKIP, resolved  #Rhabdomyolysis   #HTN  -etiology of SKIP likely ATN s/t rhabdo  -CK elevated, 2k on admission -> peaked 11,773 (9/30) -> downtrending since w/ fluids  -Baseline Cr 0.7, Cr 1.75 on admission -> 2.5 -> 0.9  "now  -Renal US 9/27 only significant for borderline increased cortical echogenicity  -UA (9/29): large blood, 3-5 RBCs, +protein, trace leuk est, 1+ bacteria, +casts  -urine myoglobin +   -started coreg 6.25 BID 10/6  -rae placed 10/6  -daily CK  -daily RFP    #Hx of urinary Frequency  #Urinary Rentention  -patient has bladder stimulating device placed in 2021 for \"frequent urination\"  -rae placed 10/6  -device has \"M-mode\" that is MRI safe    F: prn  E: replete prn  N: moist, minced diet with mod thickened liquids  GI ppx: PPI  DVT: SQH  Access: PIVs    Dispo: pending further work-up    Code status: full code (confirmed on floor transfer)     Lesly Parra MD      "

## 2023-10-11 ENCOUNTER — APPOINTMENT (OUTPATIENT)
Dept: RADIOLOGY | Facility: HOSPITAL | Age: 63
DRG: 982 | End: 2023-10-11
Payer: MEDICARE

## 2023-10-11 LAB
ALBUMIN SERPL BCP-MCNC: 2.3 G/DL (ref 3.4–5)
ALP SERPL-CCNC: 72 U/L (ref 33–136)
ALT SERPL W P-5'-P-CCNC: 66 U/L (ref 7–45)
ANION GAP SERPL CALC-SCNC: 13 MMOL/L (ref 10–20)
AST SERPL W P-5'-P-CCNC: 39 U/L (ref 9–39)
BILIRUB SERPL-MCNC: 0.6 MG/DL (ref 0–1.2)
BUN SERPL-MCNC: 31 MG/DL (ref 6–23)
CALCIUM SERPL-MCNC: 7.9 MG/DL (ref 8.6–10.6)
CHLORIDE SERPL-SCNC: 104 MMOL/L (ref 98–107)
CK SERPL-CCNC: 190 U/L (ref 0–215)
CO2 SERPL-SCNC: 26 MMOL/L (ref 21–32)
CREAT SERPL-MCNC: 0.72 MG/DL (ref 0.5–1.05)
ERYTHROCYTE [DISTWIDTH] IN BLOOD BY AUTOMATED COUNT: 13.1 % (ref 11.5–14.5)
GFR SERPL CREATININE-BSD FRML MDRD: >90 ML/MIN/1.73M*2
GLUCOSE BLD MANUAL STRIP-MCNC: 149 MG/DL (ref 74–99)
GLUCOSE BLD MANUAL STRIP-MCNC: 180 MG/DL (ref 74–99)
GLUCOSE BLD MANUAL STRIP-MCNC: 191 MG/DL (ref 74–99)
GLUCOSE BLD MANUAL STRIP-MCNC: 205 MG/DL (ref 74–99)
GLUCOSE SERPL-MCNC: 249 MG/DL (ref 74–99)
HCT VFR BLD AUTO: 31.2 % (ref 36–46)
HGB BLD-MCNC: 10.6 G/DL (ref 12–16)
MCH RBC QN AUTO: 30.5 PG (ref 26–34)
MCHC RBC AUTO-ENTMCNC: 34 G/DL (ref 32–36)
MCV RBC AUTO: 90 FL (ref 80–100)
NRBC BLD-RTO: 0 /100 WBCS (ref 0–0)
PLATELET # BLD AUTO: 230 X10*3/UL (ref 150–450)
PMV BLD AUTO: 9.1 FL (ref 7.5–11.5)
POTASSIUM SERPL-SCNC: 4.3 MMOL/L (ref 3.5–5.3)
PROT SERPL-MCNC: 5.2 G/DL (ref 6.4–8.2)
RBC # BLD AUTO: 3.48 X10*6/UL (ref 4–5.2)
SODIUM SERPL-SCNC: 139 MMOL/L (ref 136–145)
WBC # BLD AUTO: 8.5 X10*3/UL (ref 4.4–11.3)

## 2023-10-11 PROCEDURE — 74230 X-RAY XM SWLNG FUNCJ C+: CPT

## 2023-10-11 PROCEDURE — 96372 THER/PROPH/DIAG INJ SC/IM: CPT

## 2023-10-11 PROCEDURE — 82550 ASSAY OF CK (CPK): CPT

## 2023-10-11 PROCEDURE — 88342 IMHCHEM/IMCYTCHM 1ST ANTB: CPT | Performed by: PATHOLOGY

## 2023-10-11 PROCEDURE — 2500000004 HC RX 250 GENERAL PHARMACY W/ HCPCS (ALT 636 FOR OP/ED)

## 2023-10-11 PROCEDURE — 99232 SBSQ HOSP IP/OBS MODERATE 35: CPT | Performed by: INTERNAL MEDICINE

## 2023-10-11 PROCEDURE — 51702 INSERT TEMP BLADDER CATH: CPT

## 2023-10-11 PROCEDURE — 2500000001 HC RX 250 WO HCPCS SELF ADMINISTERED DRUGS (ALT 637 FOR MEDICARE OP): Performed by: STUDENT IN AN ORGANIZED HEALTH CARE EDUCATION/TRAINING PROGRAM

## 2023-10-11 PROCEDURE — 2500000001 HC RX 250 WO HCPCS SELF ADMINISTERED DRUGS (ALT 637 FOR MEDICARE OP)

## 2023-10-11 PROCEDURE — 80053 COMPREHEN METABOLIC PANEL: CPT

## 2023-10-11 PROCEDURE — 82947 ASSAY GLUCOSE BLOOD QUANT: CPT

## 2023-10-11 PROCEDURE — 2500000004 HC RX 250 GENERAL PHARMACY W/ HCPCS (ALT 636 FOR OP/ED): Performed by: RADIOLOGY

## 2023-10-11 PROCEDURE — 74230 X-RAY XM SWLNG FUNCJ C+: CPT | Performed by: RADIOLOGY

## 2023-10-11 PROCEDURE — 88305 TISSUE EXAM BY PATHOLOGIST: CPT | Performed by: PATHOLOGY

## 2023-10-11 PROCEDURE — 88305 TISSUE EXAM BY PATHOLOGIST: CPT | Mod: TC,SUR | Performed by: STUDENT IN AN ORGANIZED HEALTH CARE EDUCATION/TRAINING PROGRAM

## 2023-10-11 PROCEDURE — 88313 SPECIAL STAINS GROUP 2: CPT | Performed by: PATHOLOGY

## 2023-10-11 PROCEDURE — 0KBR3ZX EXCISION OF LEFT UPPER LEG MUSCLE, PERCUTANEOUS APPROACH, DIAGNOSTIC: ICD-10-PCS | Performed by: STUDENT IN AN ORGANIZED HEALTH CARE EDUCATION/TRAINING PROGRAM

## 2023-10-11 PROCEDURE — 1100000001 HC PRIVATE ROOM DAILY

## 2023-10-11 PROCEDURE — 99232 SBSQ HOSP IP/OBS MODERATE 35: CPT

## 2023-10-11 PROCEDURE — 85027 COMPLETE CBC AUTOMATED: CPT

## 2023-10-11 PROCEDURE — 88341 IMHCHEM/IMCYTCHM EA ADD ANTB: CPT | Performed by: PATHOLOGY

## 2023-10-11 PROCEDURE — 99153 MOD SED SAME PHYS/QHP EA: CPT

## 2023-10-11 PROCEDURE — 76942 ECHO GUIDE FOR BIOPSY: CPT | Mod: LEFT SIDE | Performed by: RADIOLOGY

## 2023-10-11 PROCEDURE — 99152 MOD SED SAME PHYS/QHP 5/>YRS: CPT

## 2023-10-11 PROCEDURE — 36415 COLL VENOUS BLD VENIPUNCTURE: CPT

## 2023-10-11 PROCEDURE — 2500000004 HC RX 250 GENERAL PHARMACY W/ HCPCS (ALT 636 FOR OP/ED): Performed by: STUDENT IN AN ORGANIZED HEALTH CARE EDUCATION/TRAINING PROGRAM

## 2023-10-11 PROCEDURE — 20206 BIOPSY MUSCLE PERQ NEEDLE: CPT

## 2023-10-11 PROCEDURE — 2580000001 HC RX 258 IV SOLUTIONS

## 2023-10-11 PROCEDURE — 20206 BIOPSY MUSCLE PERQ NEEDLE: CPT | Mod: LEFT SIDE | Performed by: RADIOLOGY

## 2023-10-11 PROCEDURE — 2500000002 HC RX 250 W HCPCS SELF ADMINISTERED DRUGS (ALT 637 FOR MEDICARE OP, ALT 636 FOR OP/ED)

## 2023-10-11 PROCEDURE — 76942 ECHO GUIDE FOR BIOPSY: CPT

## 2023-10-11 PROCEDURE — 88319 ENZYME HISTOCHEMISTRY: CPT | Performed by: PATHOLOGY

## 2023-10-11 PROCEDURE — 82955 ASSAY OF G6PD ENZYME: CPT

## 2023-10-11 PROCEDURE — 77002 NEEDLE LOCALIZATION BY XRAY: CPT

## 2023-10-11 PROCEDURE — 92611 MOTION FLUOROSCOPY/SWALLOW: CPT | Mod: GN | Performed by: SPEECH-LANGUAGE PATHOLOGIST

## 2023-10-11 PROCEDURE — G0500 MOD SEDAT ENDO SERVICE >5YRS: HCPCS | Mod: LEFT SIDE | Performed by: RADIOLOGY

## 2023-10-11 PROCEDURE — 2720000007 HC OR 272 NO HCPCS

## 2023-10-11 RX ORDER — POLYETHYLENE GLYCOL 3350 17 G/17G
17 POWDER, FOR SOLUTION ORAL DAILY
Status: DISCONTINUED | OUTPATIENT
Start: 2023-10-11 | End: 2023-10-14 | Stop reason: HOSPADM

## 2023-10-11 RX ORDER — MIDAZOLAM HYDROCHLORIDE 1 MG/ML
INJECTION INTRAMUSCULAR; INTRAVENOUS AS NEEDED
Status: COMPLETED | OUTPATIENT
Start: 2023-10-11 | End: 2023-10-11

## 2023-10-11 RX ORDER — FENTANYL CITRATE 50 UG/ML
INJECTION, SOLUTION INTRAMUSCULAR; INTRAVENOUS AS NEEDED
Status: COMPLETED | OUTPATIENT
Start: 2023-10-11 | End: 2023-10-11

## 2023-10-11 RX ORDER — HEPARIN SODIUM 5000 [USP'U]/ML
5000 INJECTION, SOLUTION INTRAVENOUS; SUBCUTANEOUS EVERY 8 HOURS
Status: DISCONTINUED | OUTPATIENT
Start: 2023-10-11 | End: 2023-10-14 | Stop reason: HOSPADM

## 2023-10-11 RX ORDER — SODIUM CHLORIDE, SODIUM LACTATE, POTASSIUM CHLORIDE, CALCIUM CHLORIDE 600; 310; 30; 20 MG/100ML; MG/100ML; MG/100ML; MG/100ML
75 INJECTION, SOLUTION INTRAVENOUS CONTINUOUS
Status: DISCONTINUED | OUTPATIENT
Start: 2023-10-11 | End: 2023-10-14 | Stop reason: HOSPADM

## 2023-10-11 RX ADMIN — Medication 25 MCG: at 12:03

## 2023-10-11 RX ADMIN — FENTANYL CITRATE 50 MCG: 50 INJECTION, SOLUTION INTRAMUSCULAR; INTRAVENOUS at 10:19

## 2023-10-11 RX ADMIN — MIDAZOLAM HYDROCHLORIDE 1 MG: 1 INJECTION, SOLUTION INTRAMUSCULAR; INTRAVENOUS at 10:19

## 2023-10-11 RX ADMIN — GABAPENTIN 900 MG: 250 SOLUTION ORAL at 20:49

## 2023-10-11 RX ADMIN — INSULIN HUMAN 5 UNITS: 100 INJECTION, SUSPENSION SUBCUTANEOUS at 18:40

## 2023-10-11 RX ADMIN — SENNOSIDES AND DOCUSATE SODIUM 1 TABLET: 50; 8.6 TABLET ORAL at 20:50

## 2023-10-11 RX ADMIN — HEPARIN SODIUM 5000 UNITS: 5000 INJECTION INTRAVENOUS; SUBCUTANEOUS at 21:02

## 2023-10-11 RX ADMIN — CARVEDILOL 3.12 MG: 3.12 TABLET, FILM COATED ORAL at 12:03

## 2023-10-11 RX ADMIN — METHYLPREDNISOLONE SODIUM SUCCINATE 60 MG: 40 INJECTION, POWDER, FOR SOLUTION INTRAMUSCULAR; INTRAVENOUS at 20:50

## 2023-10-11 RX ADMIN — CALCIUM CARBONATE (ANTACID) CHEW TAB 500 MG 500 MG: 500 CHEW TAB at 12:02

## 2023-10-11 RX ADMIN — PANTOPRAZOLE SODIUM 40 MG: 40 TABLET, DELAYED RELEASE ORAL at 12:01

## 2023-10-11 RX ADMIN — POLYETHYLENE GLYCOL 3350 17 G: 17 POWDER, FOR SOLUTION ORAL at 20:50

## 2023-10-11 RX ADMIN — GABAPENTIN 300 MG: 250 SOLUTION ORAL at 12:01

## 2023-10-11 RX ADMIN — INSULIN LISPRO 2 UNITS: 100 INJECTION, SOLUTION INTRAVENOUS; SUBCUTANEOUS at 14:07

## 2023-10-11 RX ADMIN — SODIUM CHLORIDE, POTASSIUM CHLORIDE, SODIUM LACTATE AND CALCIUM CHLORIDE 75 ML/HR: 600; 310; 30; 20 INJECTION, SOLUTION INTRAVENOUS at 12:02

## 2023-10-11 RX ADMIN — CARVEDILOL 3.12 MG: 3.12 TABLET, FILM COATED ORAL at 20:50

## 2023-10-11 ASSESSMENT — PAIN SCALES - GENERAL
PAINLEVEL_OUTOF10: 0 - NO PAIN

## 2023-10-11 ASSESSMENT — PAIN - FUNCTIONAL ASSESSMENT
PAIN_FUNCTIONAL_ASSESSMENT: 0-10

## 2023-10-11 NOTE — PROGRESS NOTES
"Bisi Mckay is a 63 y.o. female on day 10 of admission presenting with Muscle weakness.    Subjective   Patient states she feels good this morning. She feels stronger and     Objective     Physical Exam  Vitals reviewed.   Constitutional:       Comments: Laying comfortably in bed   HENT:      Head: Normocephalic and atraumatic.      Mouth/Throat:      Mouth: Mucous membranes are moist.   Eyes:      Extraocular Movements: Extraocular movements intact.   Pulmonary:      Effort: Pulmonary effort is normal.      Breath sounds: No stridor. No wheezing, rhonchi or rales.   Abdominal:      General: Abdomen is flat.      Palpations: Abdomen is soft.      Tenderness: There is no abdominal tenderness.   Musculoskeletal:         General: No swelling or deformity.      Cervical back: Normal range of motion.   Skin:     General: Skin is warm.      Findings: Bruising present. No rash.   Neurological:      Mental Status: She is alert and oriented to person, place, and time.      Cranial Nerves: Cranial nerves 2-12 are intact. No dysarthria or facial asymmetry.      Sensory: No sensory deficit.      Motor: No tremor.      Comments: Stronger in right leg over left. Upper extremity strength greatly improved, can give resistance against force.         Last Recorded Vitals  Blood pressure 141/68, pulse 92, temperature 36 °C (96.8 °F), temperature source Temporal, resp. rate 17, height 1.651 m (5' 5\"), weight 115 kg (252 lb 6.8 oz), SpO2 96 %.  Intake/Output last 3 Shifts:  I/O last 3 completed shifts:  In: 720 (6.3 mL/kg) [P.O.:720]  Out: 4100 (35.8 mL/kg) [Urine:4100 (1 mL/kg/hr)]  Weight: 114.5 kg     Relevant Results    Current Facility-Administered Medications:     barium sulfate (Varibar Honey) 40 % (w/v) 29% (w/w) suspension 10 mL, 10 mL, oral, Once in imaging, Carie Rodriguez MD    barium sulfate (Varibar Nectar, Varibar Honey) 40 % (w/v) suspension 20 mL, 20 mL, oral, Once in imaging, Carie Rodriguez MD    barium " sulfate (Varibar Pudding) 40 % (w/v), 30% (w/w) oral paste 20 mL, 20 mL, oral, Once in imaging, Carie Rodriguez MD    barium sulfate (Varibar THIN Liquid) 81 % (w/w) suspension 2 mL, 2 mL, oral, Once in imaging, Carie Rodriguez MD    calcium carbonate (Tums) chewable tablet 500 mg, 500 mg, oral, Daily, Lesly aPrra MD, 500 mg at 10/11/23 1202    carvedilol (Coreg) tablet 3.125 mg, 3.125 mg, oral, BID, Ольга Saleh MD, 3.125 mg at 10/11/23 1203    cholecalciferol (Vitamin D-3) tablet 25 mcg, 25 mcg, oral, Daily, Lesly Parra MD, 25 mcg at 10/11/23 1203    dextrose 10 % in water (D10W) infusion, 0.3 g/kg/hr, intravenous, Once PRN, Lesly Parra MD    dextrose 50 % injection 25 g, 25 g, intravenous, q15 min PRN, Lesly Parra MD    flu vacc [id number] (65yr up)-PF (Fluzone High-Dose Quad) syringe 0.7 mL, 0.7 mL, intramuscular, During hospitalization, Alysa Castro MD    gabapentin (Neurontin) solution 300 mg, 300 mg, oral, Daily before breakfast, Alysa Castro MD, 300 mg at 10/11/23 1201    gabapentin (Neurontin) solution 900 mg, 900 mg, oral, Nightly, Lesly Parra MD, 900 mg at 10/10/23 2017    glucagon (Glucagen) injection 1 mg, 1 mg, intramuscular, q15 min PRN, Lesly Parra MD    insulin lispro (HumaLOG) injection 0-10 Units, 0-10 Units, subcutaneous, TID with meals, Lesly Parra MD, 2 Units at 10/10/23 1717    insulin NPH (Isophane) (HumuLIN N,NovoLIN N) injection 5 Units, 5 Units, subcutaneous, BID AC, Lesly Parra MD, 5 Units at 10/10/23 1718    lactated Ringer's infusion, 75 mL/hr, intravenous, Continuous, Lesly Parra MD, Last Rate: 75 mL/hr at 10/11/23 1202, 75 mL/hr at 10/11/23 1202    methylPREDNISolone sod succinate (PF) (SOLU-Medrol) 40 mg/mL injection 60 mg, 60 mg, intravenous, Daily, Eber Jimenez MD, 60 mg at 10/10/23 2018    pantoprazole (ProtoNix) EC tablet 40 mg, 40 mg, oral, Daily before breakfast, Lesly Parra MD, 40  mg at 10/11/23 1201    perflutren protein A microsphere (Optison) injection 0.5 mL, 0.5 mL, intravenous, Once in imaging, Lesly Parra MD    pneumococcal conjugate (Prevnar 13) 0.5 mL vaccine 0.5 mL, 0.5 mL, intramuscular, During hospitalization, Alysa Castro MD    sennosides-docusate sodium (Ramila-Colace) 8.6-50 mg per tablet 1 tablet, 1 tablet, oral, Nightly, Alysa Castro MD, 1 tablet at 10/07/23 2040    sulfur hexafluoride microsphr (Lumason) injection 24.28 mg, 2 mL, intravenous, Once in imaging, Lesly Parra MD     Results for orders placed or performed during the hospital encounter of 10/01/23 (from the past 24 hour(s))   POCT GLUCOSE   Result Value Ref Range    POCT Glucose 172 (H) 74 - 99 mg/dL   POCT GLUCOSE   Result Value Ref Range    POCT Glucose 180 (H) 74 - 99 mg/dL   Comprehensive metabolic panel   Result Value Ref Range    Glucose 249 (H) 74 - 99 mg/dL    Sodium 139 136 - 145 mmol/L    Potassium 4.3 3.5 - 5.3 mmol/L    Chloride 104 98 - 107 mmol/L    Bicarbonate 26 21 - 32 mmol/L    Anion Gap 13 10 - 20 mmol/L    Urea Nitrogen 31 (H) 6 - 23 mg/dL    Creatinine 0.72 0.50 - 1.05 mg/dL    eGFR >90 >60 mL/min/1.73m*2    Calcium 7.9 (L) 8.6 - 10.6 mg/dL    Albumin 2.3 (L) 3.4 - 5.0 g/dL    Alkaline Phosphatase 72 33 - 136 U/L    Total Protein 5.2 (L) 6.4 - 8.2 g/dL    AST 39 9 - 39 U/L    Bilirubin, Total 0.6 0.0 - 1.2 mg/dL    ALT 66 (H) 7 - 45 U/L   Creatine Kinase   Result Value Ref Range    Creatine Kinase 190 0 - 215 U/L   CBC   Result Value Ref Range    WBC 8.5 4.4 - 11.3 x10*3/uL    nRBC 0.0 0.0 - 0.0 /100 WBCs    RBC 3.48 (L) 4.00 - 5.20 x10*6/uL    Hemoglobin 10.6 (L) 12.0 - 16.0 g/dL    Hematocrit 31.2 (L) 36.0 - 46.0 %    MCV 90 80 - 100 fL    MCH 30.5 26.0 - 34.0 pg    MCHC 34.0 32.0 - 36.0 g/dL    RDW 13.1 11.5 - 14.5 %    Platelets 230 150 - 450 x10*3/uL    MPV 9.1 7.5 - 11.5 fL   POCT GLUCOSE   Result Value Ref Range    POCT Glucose 180 (H) 74 - 99 mg/dL        MRI Spine 10/9  IMPRESSION:  Cervical spine:  1. Mild multilevel degenerative changes of the cervical spine most  notable for moderate central canal and bilateral neural foraminal  stenosis at C5-C6. No abnormal cord signal at this level.  2. There is a heterogeneous enhancing 5.7 cm right thyroid mass as  described above with mild compression of the adjacent trachea and  posterior displacement of surrounding vasculature. The vessels are  patent. Recommend correlation with thyroid ultrasound.      Thoracic spine:  1. No significant central canal or neural foraminal stenosis.  2. 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  Recommend correlation with outside imaging to document stability. If  none available six-month follow-up scan can be obtained for further  evaluation.      Lumbar spine:  1. Multilevel degenerative changes without high-grade central canal  or neural foraminal stenosis.  2. Small left pleural effusion with adjacent opacity, likely  atelectasis.          Assessment/Plan   Principal Problem:    Muscle weakness  Active Problems:    Acute hypoxic respiratory failure (CMS/HCC)    Diabetes mellitus, type 2 (CMS/HCC)    62 yo F w/ PMHx primary biliary cirrhosis, HTN, GERD, DM, hx of breast cancer s/p lumpectomy and radiation, and osteoarthritis presented to [facility] on 9/25/23 for weakness and falls [facility] for further workup of acute hypoxic respiratory failure suspected s/t muscular weakness and rheumatologic/neurologic work-up.    Patient reports she first started noticing weakness following a trip to [place]. On the 23rd of September she had a fall and later on the 25th she was found down after hours of being on the ground next to her bed, without losing consciousness. She was admitted to medicine for rhabdo (Cr 1.75, CK 2880).  Hospital course c/b UTI, worsening SKIP 2/2 rhabdo (CK peaked at 11.773). On the 28th developed respiratory failure and got intubated and transferred to the ICU. Patient extubated 10/3. Transferred to floor 10/4 for further work-up of weakness.     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 Got first dose on IVIG 10/6 and started solu-medrol 60 IV daily per rheum. 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 Plan for IR-guided muscle biopsy 10/11.    Updates 10/10:  -patient had muscle biopsy today  -MBS compelted today, okay to progress to regular diet and mildly thick liquids  -pending G6PD for dapsone intiation for PJP ppx  -HR-CT and CT A/P ordered per rheum  -  -TSH normal, orthostats positive, got 500cc bolus  -UOP 3.5L, suspected s/t diuretic/recovery phase of ATN, will intitiate mIVF  -extended myositis panel, HMG-CoAR Ab pending    #Proximal Muscle Weakness  #AHRF requiring intubation, resolved  #Dysphagia  -MRI brain 9/10 with minimal chronic microvascular angiopathy. No acute intracranial abnormality.   -CT head and C-spine 9/25 no acute abnormality.   -CXR unremarkable, respiratory panel negative, MRSA nares negative  -Anti-Ach, Lyme 2 step, NORMA, aldolase, ANCA, AMA, RF, anti-CCP, Anti-SSA/SSB, Anti-Lamar negative  -ESR wnl, CRP 13 (high)  -TSH low, fT4 wnl, anti-TPO ab wnl  -B12: >2000  -C3 132, C4 56 (high), Ceruloplasmin 37 (wnl)  -Cryoglobulin neg   -AM cortisol: wnl   -LDH  >750  -RPR, HIV negative, hepatitis panel negative  -CSF viral panel negative, normal cell count and diff, normal protein, negative cultures  -Blood cultures and fungal cultures negative  -pending HMG-CoAR Ab, extended myositis panel, TB-spot  -Neurology consulted, appreciate recs  -EMG 10/6 demonstrates abnormal RUE/RLE w/ electrophysiologic evidence of a myopathy w/ fibrillation potentials consistent w/ a  "necrotizing myopathy  -MRI spine 10/10 most notable for T11 intraosseous hemangioma with a T1 hypointense/T2 hyperintense enhancing mass along its posterior aspect, likely representing atypical presentation of an intraosseous hemangioma. Recommend six-month follow-up scan can be obtained for further evaluation.  -Rheumatology consulted, appreciate recs   -s/p IVIG 10/6   -Solu-medrol 60 mg IV daily  -IR-guided muscle biopsy completed 10/11  -MBS compelted 10/11, okay to progress to regular diet and mildly thick liquids  -pending G6PD for dapsone intiation for PJP ppx  -HR-CT and CT A/P ordered per rheum  -began Vit D and Ca supplementation per rheum  -daily trend for CPK, LFT's  -NIF ordered    #Tachycardia  -HR 110s-120s starting 10/4, normotensive-hypertensive  -ECG NSR  -TTE 10/5 w/ normal left ventricular systolic function, EF 65-70%. Slightly elevated RVSP  -TSH normal  -orthostats positive 10/11, bolused 500cc  -start mIVF high UOP suspected s/t diuretic phase of ATN    #Steroid-induced Hyperglycemia  #T2DM  -A1c 10/8 6.1  -hold home metformin  -mod SSI  -added NPH 5 BID 10/8  -hypoglycemia protocol    #SKIP, resolved  #Rhabdomyolysis   #HTN  -etiology of SKIP likely ATN s/t rhabdo  -CK elevated, 2k on admission -> peaked 11,773 (9/30) -> downtrending since w/ fluids  -Baseline Cr 0.7, Cr 1.75 on admission -> 2.5 -> 0.9 now  -Renal US 9/27 only significant for borderline increased cortical echogenicity  -UA (9/29): large blood, 3-5 RBCs, +protein, trace leuk est, 1+ bacteria, +casts  -urine myoglobin +   -started coreg 6.25 BID 10/6  -rae placed 10/6  -daily CK  -daily RFP    #Hx of urinary Frequency  #Urinary Rentention  -patient has bladder stimulating device placed in 2021 for \"frequent urination\"  -rae placed 10/6  -device has \"M-mode\" that is MRI safe    F: prn  E: replete prn  N: moist, minced diet with mod thickened liquids  GI ppx: PPI  DVT: SQH  Access: PIVs    Dispo: pending further work-up    Code " status: full code (confirmed on floor transfer)     Lesly Parra MD

## 2023-10-11 NOTE — PROGRESS NOTES
"Bisi Mckay is a 63 y.o. female on day 10 of admission presenting with Muscle weakness.    Subjective   Bisi Mckay is a 63 y.o. female on day 10 of admission presenting with Muscle weakness.    Subjective   Patient stable overall with no new complaints. Reports strength is improving in her upper arms.  No difficulty swallowing, no fever or other complaints.        Objective     Physical Exam  M/P Upper proximal arms 4/5 bilaterally   Distal upper arm 5/5   Hip flexors 2+/5, 4+/5 with extension, abduction and adduction   No new skin rashes    Last Recorded Vitals  Blood pressure 141/64, pulse 85, temperature 36.5 °C (97.7 °F), resp. rate 17, height 1.651 m (5' 5\"), weight 115 kg (252 lb 6.8 oz), SpO2 99 %.  Intake/Output last 3 Shifts:  I/O last 3 completed shifts:  In: 720 (6.3 mL/kg) [P.O.:720]  Out: 4100 (35.8 mL/kg) [Urine:4100 (1 mL/kg/hr)]  Weight: 114.5 kg                 Assessment/Plan   Bisi Mckay is a 63 y.o. female with PMHx of primary biliary cirrhosis, HTN, GERD, DM, breast cancer, and osteoarthritis, who initially presented to [facility] on 9/25 for weakness and fall. She was then transferred to  [facility] for acute hypoxic respiratory failure and rheumatologic/neuromuscular consult.  Patient was noted to have bilateral symmetric proximal weakness involving the deltoid and hip flexors, with dysphagia, and later developed acute hypoxic respiratory failure. No evident ILD changes on xray( no CT chest), no inflammatory arthritis, rashes or Raynaud's.  Her CPK on admission 2K, peaked at 11K and then down trended now to 422 with IV hydration.     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   Extended myositis panel, HMG-co reductase are still pending.      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   Getting a muscle biopsy and the extended myositis panel would identify which type of Myopathy this is and would better guide our immunosuppressive treatment plan.      CPK normalized today, her upper extremity muscle strength is better, however her LE strength is still weak with minimal improvement.        Plan:  -proximal muscle biopsy done today  -Continue Solu-medrol 60 mg IV daily   -Received IVIG 2g/Kg infusion once on 10/6/2023, given the severity of her presentation, to be redosed in a month(we will arrange for that)  -we will decide on a steroid sparing regimen once we have a better characterization of the inflammatory myopathy ( Rituximab vs csDMARD such as azathioprine. Methotrexate likely not an option with her PBC)   -Please repeat CPK today and daily   -Pending TB-spot  -Pending EMP and HMG-co reductase ab   -Please start patient on Vitamin D and calcium as bone protective measures  -Patient will eventually need to be started on PJP ppx three times weekly  -She will need to get a HR CT chest and CT A/P for ILD and malignancy screen. (Ideally to be done as inpatient before discharge)   -Continue physical therapy  -Please request an outpatient follow up in 3-4 weeks after discharge with me at [facility] on a Tuesday     Marsha Becerra MD  Rheumatology Fellow,PGY-V    Patient seen with Dr. Lezama    Rheumatology Attending    I interviewed and examined the patient and discussed the treatment plan. See above note for details.  I agree with the findings and plan of care summarized in the above note.       Shar Lezama MD

## 2023-10-11 NOTE — PRE-PROCEDURE NOTE
Interventional Radiology Preprocedure Note    Indication for procedure: concern for necrotizing myopathy    Relevant review of systems:  weakness    Relevant Labs:   Lab Results   Component Value Date    CREATININE 0.72 10/11/2023    EGFR >90 10/11/2023    INR 1.0 10/01/2023    PROTIME 11.7 10/01/2023       Planned Sedation/Anesthesia: Moderate    Airway assessment: normal    Directed physical examination:    RRR  Breathing comfortably on NC O2  Soft abdomen  A+Ox4    Mallampati: II (hard and soft palate, upper portion of tonsils anduvula visible)    ASA Score: ASA 2 - Patient with mild systemic disease with no functional limitations    Benefits, risks and alternatives of procedure and planned sedation have been discussed with the patient and/or their representative. All questions answered and they agree to proceed.

## 2023-10-11 NOTE — PROGRESS NOTES
Speech-Language Pathology    SLP Inpatient MBS/FEES Evaluation    Patient Name: Bisi Mckay  MRN: [id number]  Today's Date: 10/11/2023   Time Calculation  Start Time: 1230  Stop Time: 1300  Time Calculation (min): 30 min         Current Problem:   Bisi Mckay is a 63 y.o. female with PMHx of primary biliary cirrhosis, HTN, GERD, DM, breast cancer, and osteoarthritis, who initially presented to [facility] on 9/25 for weakness and fall. She was then transferred to  [facility] for acute hypoxic respiratory failure and rheumatologic/neuromuscular consult.       Recommendations/Treatment:   Solid Consistency: Regular (IDDSI Level 7)  Liquid Consistency: Nectar thick/mildly thick (IDDS Level 2) (with chin tuck)  Medication Administration: Take one pill at a time  Supervision:  (intermittent)  Compensatory Strategy Recommendations:  Complete oral care 3x/daily      Assessment/Impression:  - Mild pharyngeal phase dysphagia. Characterized by mild impairment in motor function, specifically tongue base propulsion and pharyngeal constriction and diminshed sensate characterized by no cough response to trace volume aspiration with thin liquids. Overall, this was an improved study vs the exam on 10/6 and pt is able to advance to regular solids and mildly thick liquids. Bisi was trained on the chin tuck maneuver to prevent airway penetration with liquid textures      Prognosis:  Prognosis for Safe Diet Advancement: Good      Plan:  Inpatient/Swing Bed or Outpatient: Inpatient  SLP Plan: Skilled SLP  SLP Frequency: 2x per week  Duration: 2 weeks  SLP Discharge Recommendations: Continue skilled speech therapy services post discharge  Diet Recommendations:  (NPO- ice chips (5/hour); aggressive oral care)  Solid Consistency: Regular (IDDSI Level 7)  Liquid Consistency: Nectar thick/mildly thick (IDDS Level 2) (with chin tuck)  Discussed POC: Patient  Discussed Risks/Benefits: Yes  Patient/Caregiver Agreeable: Yes  SLP - OK to  Discharge: Yes        Subjective   Current Problem:  Pleasant/cooperative      General Visit Information:  Patient Class: Inpatient  Living Environment: Home  Arrival: Family/caregiver present  Ordering Physician: Lesly Parra  Radiologist: Dr. Jennings  Reason for Referral: ongoing assessment; strategy review  Patient Seen During This Visit: Yes  Prior Level of Function: WFL  Date of Order: 10/05/23  Date of Evaluation: 10/06/23  Type of Study: Repeat MBS (initial on 10/ 6)  [employer] Diet: regular  Current Diet : Minced, mod thick  Dysphagia Diagnosis: Mild pharyngeal stage dysphagia        Oral Phase:  Oral Phase: Impaired     Oral Phase - Comment  Oral Phase - Comment: There is efficient bolus collection, containment and the oral residues are minimal        Pharyngeal Phase:  Pharyngeal Phase: Impaired     Pharyngeal Phase - Comment  Pharyngeal Comment: Pharyngeal swallow onset appeared timely across all challenges. Laryngeal vestibular closure is incomplete that facilitates laryngeal penetration of thin and nectar liquids. Noted thin consistency reaching the vocal folds with tsp presentation and trace degree of aspiration with thin via straw. The chin tuck was not effective with thin consistencies. Nectar liquids also reaching the vocal folds in trace volume however the chin tuck was effective. Lack of cough response to VF contact or aspiration suggests diminshed sensory response. Efficiency of the swallow was functional, with minimal- mild pharyngeal residues at the vallecular space wtih puree and cookies, suggesting weakness in tongue base propulsion. Noted hyolaryngeal elevation appeared complete wtih suspected mildly reduced anterior excursion          Esophageal Phase:  Esophageal Phase: Within Functional Limits                     Encounter Problems       Encounter Problems (Active)       Swallowing       LTG - Patient will tolerate the least restrictive diet without overt difficulty by time of  discharge. (Progressing)       Start:  10/05/23    Expected End:  10/19/23            STG - Patient will implement safe swallowing strategies to reduce risk of aspiration given caregiver assistance/cueing as needed.  (Met)       Start:  10/05/23    Expected End:  10/19/23    Resolved:  10/11/23    progressing         STG - Patient will participate in a Modified Barium Swallow Study. (Met)       Start:  10/05/23    Expected End:  10/19/23    Resolved:  10/06/23    Will schedule for 10/6            Swallowing       STG - Patient will follow recommended swallowing strategies       Start:  10/11/23    Expected End:  10/18/23       Will use chin tuck to 80% with min cues                  Outpatient Education:  Adult Outpatient Education  Individual(s) Educated: Patient  Patient/Caregiver Demonstrated Understanding: yes  Plan of Care Discussed and Agreed Upon: yes    Education Documentation  No documentation found.  Education Comments  No comments found.

## 2023-10-11 NOTE — POST-PROCEDURE NOTE
Interventional Radiology Brief Postprocedure Note    Attending: Dr. Shaver    Assistant: Dr. Snow    Diagnosis: Left vastus lateralis muscle biopsy    Description of procedure: Left vastus lateralis muscle biopsy     Anesthesia:  MAC    Complications: None    Estimated Blood Loss: minimal    Medications  As of 10/11/23 1054      dextrose 50 % injection 25 g (g) Total dose:  Cannot be calculated* Dosing weight:  104   *Administration dose not documented     Date/Time Rate/Dose/Volume Action       10/04/23  1721 *Not included in total MAR Hold      1827 *Not included in total MAR Unhold               flu vacc [id number] (65yr up)-PF (Fluzone High-Dose Quad) syringe 0.7 mL (mL) Total dose:  Cannot be calculated* Dosing weight:  104   *Administration dose not documented     Date/Time Rate/Dose/Volume Action       10/04/23  1721 *Not included in total MAR Hold      1827 *Not included in total MAR Unhold               pneumococcal conjugate (Prevnar 13) 0.5 mL vaccine 0.5 mL (mL) Total dose:  Cannot be calculated* Dosing weight:  104   *Administration dose not documented     Date/Time Rate/Dose/Volume Action       10/04/23  1721 *Not included in total MAR Hold      1827 *Not included in total MAR Unhold               heparin (porcine) injection 5,000 Units (Units) Total dose:  115,000 Units* Dosing weight:  104   *Administration not included in total     Date/Time Rate/Dose/Volume Action       10/01/23  1700 *5,000 Units Missed     10/02/23  0027 5,000 Units Given      0816 5,000 Units Given      1650 5,000 Units Given     10/03/23  0117 5,000 Units Given      0916 5,000 Units Given      1800 5,000 Units Given     10/04/23  0026 5,000 Units Given      0836 5,000 Units Given      1629 5,000 Units Given      1721 *Not included in total MAR Hold      1827 *Not included in total MAR Unhold     10/05/23  0205 5,000 Units Given      0941 5,000 Units Given      1655 5,000 Units Given     10/06/23  0135 5,000 Units Given       1233 5,000 Units Given      2123 5,000 Units Given     10/07/23  0430 5,000 Units Given      1237 5,000 Units Given      2038 5,000 Units Given     10/08/23  0400 5,000 Units Given      1243 5,000 Units Given      2107 5,000 Units Given     10/09/23  0400 *5,000 Units Missed      1335 5,000 Units Given      2256 5,000 Units Given               heparin (porcine) injection 5,000 Units (Units) Total dose:  5,000 Units Dosing weight:  115      Date/Time Rate/Dose/Volume Action       10/10/23  1713 5,000 Units Given               oxygen (O2) therapy (%) Total dose:  0  Dosing weight:  104      Date/Time Rate/Dose/Volume Action       10/01/23  1700  Start     10/02/23  0034  Rate Verify      0800 40 % Start      2130  Rate Verify     10/03/23  0748  Rate Verify      0800  Start      1600  Rate Verify               oxygen (O2) therapy (L/min) Total volume:  Not documented* Dosing weight:  109   *Total volume has not been documented. View each administration to see the amount administered.     Date/Time Rate/Dose/Volume Action       10/03/23  1800 4 L/min Start      2200 2 L/min Rate Change     10/04/23  0000  Stopped      0801  Stopped      0843  Stopped               polyethylene glycol (Glycolax, Miralax) packet 17 g (g) Total dose:  34 g* Dosing weight:  104   *Administration not included in total     Date/Time Rate/Dose/Volume Action       10/01/23  1700 *17 g Missed      2041 17 g Given     10/02/23  0900 *17 g Missed     10/03/23  0916 17 g Given               dexmedeTOMIDine in NS (Precedex) 400 mcg in 100 mL (4 mcg/mL) infusion (mcg/kg/hr) Total dose:  1,657.04 mcg* Dosing weight:  104   *From user-documented volume     Date/Time Rate/Dose/Volume Action       10/01/23  1700 0.2 mcg/kg/hr - 5.2 mL/hr New Bag      1821  Stopped      1900 0.2 mcg/kg/hr - 5.2 mL/hr Restarted      2000 0.3 mcg/kg/hr - 7.8 mL/hr Rate Change      2100 0.3 mcg/kg/hr - 7.8 mL/hr Rate Verify      2300 0.5 mcg/kg/hr - 13 mL/hr Rate Change       2328 0.5 mcg/kg/hr - 13 mL/hr New Bag     10/02/23  0028 0.5 mcg/kg/hr - 13 mL/hr New Bag      0200 0.3 mcg/kg/hr - 7.8 mL/hr Rate Change      0700 0.3 mcg/kg/hr - 7.8 mL/hr Rate Verify      Canceled Entry      1116 0.3 mcg/kg/hr - 7.8 mL/hr Rate Verify      1300  Stopped      1600 0.3 mcg/kg/hr - 7.8 mL/hr Restarted      1615 0.4 mcg/kg/hr - 10.4 mL/hr Rate Change      1635 0.5 mcg/kg/hr - 13 mL/hr Rate Change      1650 0.6 mcg/kg/hr - 15.6 mL/hr New Bag      1730 0.7 mcg/kg/hr - 18.2 mL/hr Rate Change      1800 0.8 mcg/kg/hr - 20.8 mL/hr Rate Change      1900 0.8 mcg/kg/hr - 20.8 mL/hr Rate Verify      2300 0.808 mcg/kg/hr - 21 mL/hr New Bag     10/03/23  0203 0.796 mcg/kg/hr - 20.7 mL/hr New Bag      0700 0.8 mcg/kg/hr - 20.8 mL/hr Rate Verify      0915 0.8 mcg/kg/hr - 20.8 mL/hr New Bag      1000  Stopped               glucagon (Glucagen) injection 1 mg (mg) Total dose:  Cannot be calculated* Dosing weight:  104   *Administration dose not documented     Date/Time Rate/Dose/Volume Action       10/04/23  1721 *Not included in total MAR Hold      1827 *Not included in total MAR Unhold               dextrose 10 % infusion (g/kg/hr) Total dose:  Not documented* Dosing weight:  104   *Total volume has not been documented. View each administration to see the amount administered.     Date/Time Rate/Dose/Volume Action       10/02/23  0700  Stopped     10/04/23  1721 *Not included in total MAR Hold      1827 *Not included in total MAR Unhold               dextrose 5 % and lactated Ringer's infusion (mL/hr) Total volume:  Not documented* Dosing weight:  104   *Total volume has not been documented. View each administration to see the amount administered.     Date/Time Rate/Dose/Volume Action       10/01/23  1745 *125 mL/hr Missed               dextrose 5% infusion (mL/hr) Total volume:  1,030 mL* Dosing weight:  104   *From user-documented volume     Date/Time Rate/Dose/Volume Action       10/01/23  1745 100 mL/hr New Bag       1756 *Not included in total Held by provider      1821 60 mL Stopped      1947 *Not included in total Unheld by provider      2000 100 mL/hr Restarted      2100 100 mL/hr Rate Verify     10/02/23  0128 100 mL/hr - 546.67 mL Rate Verify      0542 423.33 mL       0700  Stopped               sennosides-docusate sodium (Ramila-Colace) 8.6-50 mg per tablet 1 tablet (tablet) Total dose:  3 tablet* Dosing weight:  104   *Administration not included in total     Date/Time Rate/Dose/Volume Action       10/01/23  2040 1 tablet Given     10/02/23  2013 1 tablet Given     10/03/23  2100 *1 tablet Missed     10/04/23  1721 *Not included in total MAR Hold      1827 *Not included in total MAR Unhold      2100 *1 tablet Missed     10/05/23  2100 *1 tablet Missed     10/06/23  2100 *1 tablet Missed     10/07/23  2040 1 tablet Given     10/08/23  2100 *1 tablet Missed     10/09/23  2100 *1 tablet Missed     10/10/23  2100 *1 tablet Missed               gabapentin (Neurontin) solution 300 mg (mg) Total dose:  2,700 mg Dosing weight:  104      Date/Time Rate/Dose/Volume Action       10/02/23  0816 300 mg Given     10/03/23  0917 300 mg Given     10/04/23  0646 300 mg Given      1721 *Not included in total MAR Hold      1827 *Not included in total MAR Unhold     10/05/23  1655 300 mg Given     10/06/23  1233 300 mg Given     10/07/23  0826 300 mg Given     10/08/23  0833 300 mg Given     10/09/23  0907 300 mg Given     10/10/23  0936 300 mg Given               gabapentin (Neurontin) solution 900 mg (mg) Total dose:  7,200 mg* Dosing weight:  104   *Administration not included in total     Date/Time Rate/Dose/Volume Action       10/01/23  2100 *900 mg Missed     10/02/23  2013 900 mg Given     10/03/23  2100 *900 mg Missed     10/04/23  0027 900 mg Given      1721 *Not included in total MAR Hold      1827 *Not included in total MAR Unhold      2100 *900 mg Missed     10/05/23  2230 900 mg Given     10/06/23  2122 900 mg Given      10/07/23  2038 900 mg Given     10/08/23  2107 900 mg Given     10/09/23  2255 900 mg Given     10/10/23  2017 900 mg Given               esomeprazole (NexIUM) suspension 40 mg (mg) Total dose:  280 mg* Dosing weight:  104   *Administration not included in total     Date/Time Rate/Dose/Volume Action       10/02/23  0816 40 mg Given     10/03/23  0916 40 mg Given     10/04/23  0646 40 mg Given      1721 *Not included in total MAR Hold      1827 *Not included in total MAR Unhold     10/05/23  0940 40 mg Given     10/06/23  1233 40 mg Given     10/07/23  0826 40 mg Given     10/08/23  0833 40 mg Given     10/09/23  0700 *40 mg Missed               potassium phosphates 15 mmol in dextrose 5% 250 mL IV (mL/hr) Total dose:  14.71 mmol* Dosing weight:  109   *From user-documented volume     Date/Time Rate/Dose/Volume Action       10/02/23  1420 15 mmol - 63.8 mL/hr (over 240 min) - 250 mL [vol] New Bag      1820  (over 240 min) Stopped               lactulose 20 gram/30 mL oral solution 20 g (g) Total dose:  20 g* Dosing weight:  109   *Administration not included in total     Date/Time Rate/Dose/Volume Action       10/03/23  1200 *20 g Missed      1353 20 g Given               white petrolatum (Aquaphor) 41 % ointment  - Omnicell Override Pull Total dose:  Cannot be calculated*   *Administration does not have dose documented     Date/Time Rate/Dose/Volume Action       10/03/23  1215  Given               lactated Ringer's bolus 500 mL (mL/hr) Total volume:  1,000 mL* Dosing weight:  109   *From user-documented volume     Date/Time Rate/Dose/Volume Action       10/04/23  0544 500 mL - 500 mL/hr (over 60 min) New Bag      0644 500 mL Stopped      1154 500 mL - 500 mL/hr (over 60 min) [dose] - 500 mL [vol] New Bag      1223  (over 60 min) Stopped               lactated Ringer's bolus 500 mL (mL/hr) Total volume:  Not documented* Dosing weight:  115   *Total volume has not been documented. View each administration to see the  amount administered.     Date/Time Rate/Dose/Volume Action       10/10/23  1759 500 mL - 250 mL/hr (over 120 min) New Bag      1959  (over 120 min) Stopped               lactated Ringer's infusion (mL/hr) Total volume:  Not documented* Dosing weight:  109   *Total volume has not been documented. View each administration to see the amount administered.     Date/Time Rate/Dose/Volume Action       10/04/23  1145 *125 mL/hr Missed               insulin lispro (HumaLOG) injection 0-5 Units (Units) Total dose:  4 Units Dosing weight:  109      Date/Time Rate/Dose/Volume Action       10/04/23  1226 1 Units Given      1615 *Not included in total Missed      1721 *Not included in total MAR Hold      1827 *Not included in total MAR Unhold      2015 *Not included in total Missed     10/05/23  0205 1 Units Given      0941 1 Units Given      1439 1 Units Given      1615 *Not included in total Missed               insulin lispro (HumaLOG) injection 0-10 Units (Units) Total dose:  4 Units Dosing weight:  109      Date/Time Rate/Dose/Volume Action       10/05/23  2018 2 Units Given     10/06/23  0800 *Not included in total Missed      1234 2 Units Given               insulin lispro (HumaLOG) injection 0-15 Units (Units) Total dose:  48 Units Dosing weight:  112      Date/Time Rate/Dose/Volume Action       10/06/23  1852 3 Units Given     10/07/23  0826 6 Units Given      1237 9 Units Given      1844 6 Units Given     10/08/23  0833 6 Units Given      1243 6 Units Given      1752 3 Units Given     10/09/23  0908 9 Units Given               insulin lispro (HumaLOG) injection 0-10 Units (Units) Total dose:  6 Units Dosing weight:  115      Date/Time Rate/Dose/Volume Action       10/09/23  1336 2 Units Given      1841 2 Units Given     10/10/23  0800 *Not included in total Missed      1200 *Not included in total Missed      1717 2 Units Given               perflutren lipid microspheres (Definity) injection 0.5 mL (mL) Total volume:   0.5 mL Dosing weight:  109      Date/Time Rate/Dose/Volume Action       10/06/23  0927 0.5 mL Given               potassium chloride (Klor-Con) packet 20 mEq (mEq) Total dose:  20 mEq Dosing weight:  112      Date/Time Rate/Dose/Volume Action       10/06/23  1233 20 mEq Given               barium sulfate (Varibar THIN Liquid) 81 % (w/w) suspension 20 mL (mL) Total volume:  20 mL Dosing weight:  112      Date/Time Rate/Dose/Volume Action       10/06/23  1130 20 mL Given               barium sulfate (Varibar Nectar, Varibar Honey) 40 % (w/v) suspension 20 mL (mL) Total volume:  20 mL Dosing weight:  112      Date/Time Rate/Dose/Volume Action       10/06/23  1129 20 mL Given               barium sulfate (Varibar Pudding) 40 % (w/v), 30% (w/w) oral paste 20 mL (mL) Total volume:  20 mL Dosing weight:  112      Date/Time Rate/Dose/Volume Action       10/06/23  1129 20 mL Given               carvedilol (Coreg) tablet 6.25 mg (mg) Total dose:  12.5 mg* Dosing weight:  112   *Administration not included in total     Date/Time Rate/Dose/Volume Action       10/06/23  1345 *6.25 mg Missed      2123 6.25 mg Given     10/07/23  0825 6.25 mg Given               magnesium sulfate IV 2 g (mL/hr) Total volume:  Cannot be calculated* Dosing weight:  112   *Total user-documented volume 100 mL may contain volume from other administrations     Date/Time Rate/Dose/Volume Action       10/06/23  2122 2 g - 25 mL/hr (over 120 min) New Bag      2322  (over 120 min) Stopped               immune globulin (human) (Gammagard Liquid 10%) infusion 110 g (g) Total dose:  Cannot be calculated* Dosing weight:  112   *Administration dose not documented     Date/Time Rate/Dose/Volume Action       10/06/23  Canceled Entry               immune globulin (human) (Gammagard Liquid 10%) infusion 160 g (g) Total dose:  160 g* Dosing weight:  112   *From user-documented volume     Date/Time Rate/Dose/Volume Action       10/06/23  2316 160 g New Bag      10/07/23  0430 1,600 mL Stopped               methylPREDNISolone sod succinate (PF) (SOLU-Medrol) 40 mg/mL injection 60 mg (mg) Total dose:  300 mg Dosing weight:  112      Date/Time Rate/Dose/Volume Action       10/06/23  2316 60 mg Given     10/07/23  2038 60 mg Given     10/08/23  2107 60 mg Given     10/09/23  2256 60 mg Given     10/10/23  2018 60 mg Given               carvedilol (Coreg) tablet 3.125 mg (mg) Total dose:  21.875 mg Dosing weight:  115      Date/Time Rate/Dose/Volume Action       10/07/23  2039 3.125 mg Given     10/08/23  0833 3.125 mg Given      2107 3.125 mg Given     10/09/23  0910 3.125 mg Given      2256 3.125 mg Given     10/10/23  0937 3.125 mg Given      2017 3.125 mg Given               oxyCODONE (Roxicodone) immediate release tablet 5 mg (mg) Total dose:  5 mg Dosing weight:  115      Date/Time Rate/Dose/Volume Action       10/08/23  2107 5 mg Given               insulin NPH (Isophane) (HumuLIN N,NovoLIN N) injection 5 Units (Units) Total dose:  20 Units* Dosing weight:  115   *Administration not included in total     Date/Time Rate/Dose/Volume Action       10/08/23  1752 5 Units Given     10/09/23  0909 5 Units Given      1842 5 Units Given     10/10/23  0700 *5 Units Missed      1718 5 Units Given               magnesium sulfate IV 4 g (g) Total dose:  Cannot be calculated* Dosing weight:  115   *Total user-documented volume 100 mL may contain volume from other administrations     Date/Time Rate/Dose/Volume Action       10/08/23  1445 *4 g - 50 mL/hr (over 120 min) Missed               potassium chloride 20 mEq in 100 mL IV premix (mL/hr) Total volume:  Not documented* Dosing weight:  115   *Total volume has not been documented. View each administration to see the amount administered.     Date/Time Rate/Dose/Volume Action       10/08/23  1753 20 mEq - 50 mL/hr (over 120 min) New Bag      2104  (over 120 min) Stopped               pantoprazole (ProtoNix) EC tablet 40 mg (mg) Total  dose:  80 mg Dosing weight:  115      Date/Time Rate/Dose/Volume Action       10/09/23  0907 40 mg Given     10/10/23  0937 40 mg Given               gadoterate meglumine (Dotarem) 0.5 mmol/mL contrast injection 20 mL (mL) Total volume:  20 mL Dosing weight:  115      Date/Time Rate/Dose/Volume Action       10/09/23  2246 20 mL Given               cholecalciferol (Vitamin D-3) tablet 25 mcg (mcg) Total dose:  25 mcg Dosing weight:  115      Date/Time Rate/Dose/Volume Action       10/10/23  1311 25 mcg Given               calcium carbonate (Tums) chewable tablet 500 mg (mg) Total dose:  500 mg Dosing weight:  115      Date/Time Rate/Dose/Volume Action       10/10/23  1311 500 mg Given               fentaNYL PF (Sublimaze) injection (mcg) Total dose:  50 mcg      Date/Time Rate/Dose/Volume Action       10/11/23  1019 50 mcg Given               midazolam (Versed) injection (mg) Total dose:  1 mg      Date/Time Rate/Dose/Volume Action       10/11/23  1019 1 mg Given                 Using ultrasound guidance a total of 5 passes were made into the lymph node using a 18 Gauge BARD needle passed through a 17 gauge coaxial system with minimal tissue obtained. Subsequently 4 passes were made through the left vastus lateralis with a 16 gauge BARD needle which yielded 4 small cores.    Scanning after each pass demonstrated no evidence of significant postprocedure bleeding. See PACS for full procedural report.        See detailed result report with images in PACS.    The patient tolerated the procedure well without incident or complication and is in stable condition.

## 2023-10-11 NOTE — PROGRESS NOTES
Physical Therapy                 Therapy Communication Note    Patient Name: Bisi Mckay  MRN: [id number]  Today's Date: 10/11/2023     Discipline: Physical Therapy    Missed Visit Reason: Missed Visit Reason: Patient in a medical procedure    Missed Time: Attempt @ 0927    Comment: Pt off adrian to IR for muscle biopsy. Will follow up as schedule allows.

## 2023-10-12 ENCOUNTER — APPOINTMENT (OUTPATIENT)
Dept: RADIOLOGY | Facility: HOSPITAL | Age: 63
DRG: 982 | End: 2023-10-12
Payer: MEDICARE

## 2023-10-12 LAB
ALBUMIN SERPL BCP-MCNC: 2.3 G/DL (ref 3.4–5)
ALP SERPL-CCNC: 72 U/L (ref 33–136)
ALT SERPL W P-5'-P-CCNC: 58 U/L (ref 7–45)
ANION GAP SERPL CALC-SCNC: 15 MMOL/L (ref 10–20)
AST SERPL W P-5'-P-CCNC: 40 U/L (ref 9–39)
BILIRUB SERPL-MCNC: 0.5 MG/DL (ref 0–1.2)
BUN SERPL-MCNC: 32 MG/DL (ref 6–23)
CALCIUM SERPL-MCNC: 7.7 MG/DL (ref 8.6–10.6)
CHLORIDE SERPL-SCNC: 103 MMOL/L (ref 98–107)
CO2 SERPL-SCNC: 25 MMOL/L (ref 21–32)
CREAT SERPL-MCNC: 0.74 MG/DL (ref 0.5–1.05)
ERYTHROCYTE [DISTWIDTH] IN BLOOD BY AUTOMATED COUNT: 13.5 % (ref 11.5–14.5)
GFR SERPL CREATININE-BSD FRML MDRD: 90 ML/MIN/1.73M*2
GLUCOSE BLD MANUAL STRIP-MCNC: 136 MG/DL (ref 74–99)
GLUCOSE BLD MANUAL STRIP-MCNC: 138 MG/DL (ref 74–99)
GLUCOSE BLD MANUAL STRIP-MCNC: 198 MG/DL (ref 74–99)
GLUCOSE BLD MANUAL STRIP-MCNC: 244 MG/DL (ref 74–99)
GLUCOSE SERPL-MCNC: 268 MG/DL (ref 74–99)
HCT VFR BLD AUTO: 36.6 % (ref 36–46)
HGB BLD-MCNC: 11.1 G/DL (ref 12–16)
MAGNESIUM SERPL-MCNC: 1.78 MG/DL (ref 1.6–2.4)
MCH RBC QN AUTO: 30.1 PG (ref 26–34)
MCHC RBC AUTO-ENTMCNC: 30.3 G/DL (ref 32–36)
MCV RBC AUTO: 99 FL (ref 80–100)
NRBC BLD-RTO: 0 /100 WBCS (ref 0–0)
PLATELET # BLD AUTO: 204 X10*3/UL (ref 150–450)
PMV BLD AUTO: 9.5 FL (ref 7.5–11.5)
POTASSIUM SERPL-SCNC: 4.2 MMOL/L (ref 3.5–5.3)
PROT SERPL-MCNC: 5.4 G/DL (ref 6.4–8.2)
RBC # BLD AUTO: 3.69 X10*6/UL (ref 4–5.2)
SODIUM SERPL-SCNC: 139 MMOL/L (ref 136–145)
WBC # BLD AUTO: 9.1 X10*3/UL (ref 4.4–11.3)

## 2023-10-12 PROCEDURE — 2500000004 HC RX 250 GENERAL PHARMACY W/ HCPCS (ALT 636 FOR OP/ED)

## 2023-10-12 PROCEDURE — 2500000001 HC RX 250 WO HCPCS SELF ADMINISTERED DRUGS (ALT 637 FOR MEDICARE OP)

## 2023-10-12 PROCEDURE — 2500000001 HC RX 250 WO HCPCS SELF ADMINISTERED DRUGS (ALT 637 FOR MEDICARE OP): Performed by: STUDENT IN AN ORGANIZED HEALTH CARE EDUCATION/TRAINING PROGRAM

## 2023-10-12 PROCEDURE — 80053 COMPREHEN METABOLIC PANEL: CPT

## 2023-10-12 PROCEDURE — 82542 COL CHROMOTOGRAPHY QUAL/QUAN: CPT | Mod: CMCLAB

## 2023-10-12 PROCEDURE — 2500000002 HC RX 250 W HCPCS SELF ADMINISTERED DRUGS (ALT 637 FOR MEDICARE OP, ALT 636 FOR OP/ED)

## 2023-10-12 PROCEDURE — 2500000004 HC RX 250 GENERAL PHARMACY W/ HCPCS (ALT 636 FOR OP/ED): Performed by: STUDENT IN AN ORGANIZED HEALTH CARE EDUCATION/TRAINING PROGRAM

## 2023-10-12 PROCEDURE — 36415 COLL VENOUS BLD VENIPUNCTURE: CPT

## 2023-10-12 PROCEDURE — 96372 THER/PROPH/DIAG INJ SC/IM: CPT

## 2023-10-12 PROCEDURE — 1100000001 HC PRIVATE ROOM DAILY

## 2023-10-12 PROCEDURE — G1004 CDSM NDSC: HCPCS

## 2023-10-12 PROCEDURE — 85027 COMPLETE CBC AUTOMATED: CPT

## 2023-10-12 PROCEDURE — 83735 ASSAY OF MAGNESIUM: CPT

## 2023-10-12 PROCEDURE — 82947 ASSAY GLUCOSE BLOOD QUANT: CPT

## 2023-10-12 PROCEDURE — 99232 SBSQ HOSP IP/OBS MODERATE 35: CPT

## 2023-10-12 PROCEDURE — 74176 CT ABD & PELVIS W/O CONTRAST: CPT | Performed by: RADIOLOGY

## 2023-10-12 PROCEDURE — 2580000001 HC RX 258 IV SOLUTIONS

## 2023-10-12 RX ORDER — ACETAMINOPHEN 500 MG
5 TABLET ORAL NIGHTLY
Status: DISCONTINUED | OUTPATIENT
Start: 2023-10-12 | End: 2023-10-14 | Stop reason: HOSPADM

## 2023-10-12 RX ADMIN — GABAPENTIN 300 MG: 250 SOLUTION ORAL at 08:10

## 2023-10-12 RX ADMIN — Medication 5 MG: at 20:30

## 2023-10-12 RX ADMIN — INSULIN HUMAN 5 UNITS: 100 INJECTION, SUSPENSION SUBCUTANEOUS at 08:11

## 2023-10-12 RX ADMIN — INSULIN LISPRO 2 UNITS: 100 INJECTION, SOLUTION INTRAVENOUS; SUBCUTANEOUS at 14:01

## 2023-10-12 RX ADMIN — CALCIUM CARBONATE (ANTACID) CHEW TAB 500 MG 500 MG: 500 CHEW TAB at 08:10

## 2023-10-12 RX ADMIN — METHYLPREDNISOLONE SODIUM SUCCINATE 60 MG: 40 INJECTION, POWDER, FOR SOLUTION INTRAMUSCULAR; INTRAVENOUS at 20:28

## 2023-10-12 RX ADMIN — Medication 25 MCG: at 08:10

## 2023-10-12 RX ADMIN — HEPARIN SODIUM 5000 UNITS: 5000 INJECTION INTRAVENOUS; SUBCUTANEOUS at 05:26

## 2023-10-12 RX ADMIN — SODIUM CHLORIDE, POTASSIUM CHLORIDE, SODIUM LACTATE AND CALCIUM CHLORIDE 75 ML/HR: 600; 310; 30; 20 INJECTION, SOLUTION INTRAVENOUS at 14:02

## 2023-10-12 RX ADMIN — HEPARIN SODIUM 5000 UNITS: 5000 INJECTION INTRAVENOUS; SUBCUTANEOUS at 14:01

## 2023-10-12 RX ADMIN — CARVEDILOL 3.12 MG: 3.12 TABLET, FILM COATED ORAL at 08:10

## 2023-10-12 RX ADMIN — INSULIN HUMAN 5 UNITS: 100 INJECTION, SUSPENSION SUBCUTANEOUS at 18:23

## 2023-10-12 RX ADMIN — HEPARIN SODIUM 5000 UNITS: 5000 INJECTION INTRAVENOUS; SUBCUTANEOUS at 22:00

## 2023-10-12 RX ADMIN — GABAPENTIN 900 MG: 250 SOLUTION ORAL at 20:28

## 2023-10-12 RX ADMIN — INSULIN LISPRO 4 UNITS: 100 INJECTION, SOLUTION INTRAVENOUS; SUBCUTANEOUS at 08:12

## 2023-10-12 RX ADMIN — PANTOPRAZOLE SODIUM 40 MG: 40 TABLET, DELAYED RELEASE ORAL at 08:10

## 2023-10-12 RX ADMIN — CARVEDILOL 3.12 MG: 3.12 TABLET, FILM COATED ORAL at 20:30

## 2023-10-12 RX ADMIN — POLYETHYLENE GLYCOL 3350 17 G: 17 POWDER, FOR SOLUTION ORAL at 08:13

## 2023-10-12 RX ADMIN — SENNOSIDES AND DOCUSATE SODIUM 1 TABLET: 50; 8.6 TABLET ORAL at 20:30

## 2023-10-12 NOTE — PROGRESS NOTES
"Bisi Mckay is a 63 y.o. female on day 11 of admission presenting with Muscle weakness.    Subjective   Patient feels well this AM. Tolerating regular diet with mildly thickened liquids without issue.     Objective     Physical Exam  Vitals reviewed.   Constitutional:       Comments: Laying comfortably in bed   HENT:      Head: Normocephalic and atraumatic.      Mouth/Throat:      Mouth: Mucous membranes are moist.   Eyes:      Extraocular Movements: Extraocular movements intact.   Pulmonary:      Effort: Pulmonary effort is normal.      Breath sounds: No stridor. No wheezing, rhonchi or rales.   Abdominal:      General: Abdomen is flat.      Palpations: Abdomen is soft.      Tenderness: There is no abdominal tenderness.   Musculoskeletal:         General: No swelling or deformity.      Cervical back: Normal range of motion.   Skin:     General: Skin is warm.      Findings: Bruising present. No rash.   Neurological:      Mental Status: She is alert and oriented to person, place, and time.      Cranial Nerves: Cranial nerves 2-12 are intact. No dysarthria or facial asymmetry.      Sensory: No sensory deficit.      Motor: No tremor.      Comments: LE strength 2/5. UE strength 4+/5. Sensation intact diffusely         Last Recorded Vitals  Blood pressure 125/50, pulse 73, temperature 36.1 °C (97 °F), resp. rate 16, height 1.651 m (5' 5\"), weight 115 kg (252 lb 6.8 oz), SpO2 100 %.  Intake/Output last 3 Shifts:  I/O last 3 completed shifts:  In: 2145 (18.7 mL/kg) [P.O.:720; I.V.:1425 (12.4 mL/kg)]  Out: 4900 (42.8 mL/kg) [Urine:4900 (1.2 mL/kg/hr)]  Weight: 114.5 kg     Relevant Results    Current Facility-Administered Medications:     barium sulfate (Varibar Honey) 40 % (w/v) 29% (w/w) suspension 10 mL, 10 mL, oral, Once in imaging, Carie Rodriguez MD    barium sulfate (Varibar Nectar, Varibar Honey) 40 % (w/v) suspension 20 mL, 20 mL, oral, Once in imaging, Carie Rodriguez MD    barium sulfate (Varibar " Pudding) 40 % (w/v), 30% (w/w) oral paste 20 mL, 20 mL, oral, Once in imaging, Carie Rodriguez MD    barium sulfate (Varibar THIN Liquid) 81 % (w/w) suspension 2 mL, 2 mL, oral, Once in imaging, Carie Rodriguez MD    calcium carbonate (Tums) chewable tablet 500 mg, 500 mg, oral, Daily, Lesly Parra MD, 500 mg at 10/12/23 0810    carvedilol (Coreg) tablet 3.125 mg, 3.125 mg, oral, BID, Ольга Saleh MD, 3.125 mg at 10/12/23 0810    cholecalciferol (Vitamin D-3) tablet 25 mcg, 25 mcg, oral, Daily, Lesly Parra MD, 25 mcg at 10/12/23 0810    dextrose 10 % in water (D10W) infusion, 0.3 g/kg/hr, intravenous, Once PRN, Lesly Parra MD    dextrose 50 % injection 25 g, 25 g, intravenous, q15 min PRN, Lesly Parra MD    flu vacc [id number] (65yr up)-PF (Fluzone High-Dose Quad) syringe 0.7 mL, 0.7 mL, intramuscular, During hospitalization, Alysa Castro MD    gabapentin (Neurontin) solution 300 mg, 300 mg, oral, Daily before breakfast, Alysa Castro MD, 300 mg at 10/12/23 0810    gabapentin (Neurontin) solution 900 mg, 900 mg, oral, Nightly, Lesly Parra MD, 900 mg at 10/11/23 2049    glucagon (Glucagen) injection 1 mg, 1 mg, intramuscular, q15 min PRN, Lesly Parra MD    heparin (porcine) injection 5,000 Units, 5,000 Units, subcutaneous, q8h, Lesly Parra MD, 5,000 Units at 10/12/23 1401    insulin lispro (HumaLOG) injection 0-10 Units, 0-10 Units, subcutaneous, TID with meals, Lesly Parra MD, 2 Units at 10/12/23 1401    insulin NPH (Isophane) (HumuLIN N,NovoLIN N) injection 5 Units, 5 Units, subcutaneous, BID AC, Lesly Parra MD, 5 Units at 10/12/23 0811    lactated Ringer's infusion, 75 mL/hr, intravenous, Continuous, Lesly Parra MD, Last Rate: 75 mL/hr at 10/12/23 1402, 75 mL/hr at 10/12/23 1402    melatonin tablet 5 mg, 5 mg, oral, Nightly, Taurus Dunn MD    methylPREDNISolone sod succinate (PF) (SOLU-Medrol) 40 mg/mL injection 60 mg,  60 mg, intravenous, Daily, Eber Jimenez MD, 60 mg at 10/11/23 2050    pantoprazole (ProtoNix) EC tablet 40 mg, 40 mg, oral, Daily before breakfast, Lesly Parra MD, 40 mg at 10/12/23 0810    perflutren protein A microsphere (Optison) injection 0.5 mL, 0.5 mL, intravenous, Once in imaging, Lesly Parra MD    pneumococcal conjugate (Prevnar 13) 0.5 mL vaccine 0.5 mL, 0.5 mL, intramuscular, During hospitalization, Alysa Castro MD    polyethylene glycol (Glycolax, Miralax) packet 17 g, 17 g, oral, Daily, Taurus Dunn MD, 17 g at 10/12/23 0813    sennosides-docusate sodium (Ramila-Colace) 8.6-50 mg per tablet 1 tablet, 1 tablet, oral, Nightly, Alysa Castro MD, 1 tablet at 10/11/23 2050    sulfur hexafluoride microsphr (Lumason) injection 24.28 mg, 2 mL, intravenous, Once in imaging, Lesly Parra MD     Results for orders placed or performed during the hospital encounter of 10/01/23 (from the past 24 hour(s))   POCT GLUCOSE   Result Value Ref Range    POCT Glucose 205 (H) 74 - 99 mg/dL   POCT GLUCOSE   Result Value Ref Range    POCT Glucose 244 (H) 74 - 99 mg/dL   Magnesium   Result Value Ref Range    Magnesium 1.78 1.60 - 2.40 mg/dL   CBC   Result Value Ref Range    WBC 9.1 4.4 - 11.3 x10*3/uL    nRBC 0.0 0.0 - 0.0 /100 WBCs    RBC 3.69 (L) 4.00 - 5.20 x10*6/uL    Hemoglobin 11.1 (L) 12.0 - 16.0 g/dL    Hematocrit 36.6 36.0 - 46.0 %    MCV 99 80 - 100 fL    MCH 30.1 26.0 - 34.0 pg    MCHC 30.3 (L) 32.0 - 36.0 g/dL    RDW 13.5 11.5 - 14.5 %    Platelets 204 150 - 450 x10*3/uL    MPV 9.5 7.5 - 11.5 fL   Comprehensive metabolic panel   Result Value Ref Range    Glucose 268 (H) 74 - 99 mg/dL    Sodium 139 136 - 145 mmol/L    Potassium 4.2 3.5 - 5.3 mmol/L    Chloride 103 98 - 107 mmol/L    Bicarbonate 25 21 - 32 mmol/L    Anion Gap 15 10 - 20 mmol/L    Urea Nitrogen 32 (H) 6 - 23 mg/dL    Creatinine 0.74 0.50 - 1.05 mg/dL    eGFR 90 >60 mL/min/1.73m*2    Calcium 7.7 (L) 8.6 - 10.6  mg/dL    Albumin 2.3 (L) 3.4 - 5.0 g/dL    Alkaline Phosphatase 72 33 - 136 U/L    Total Protein 5.4 (L) 6.4 - 8.2 g/dL    AST 40 (H) 9 - 39 U/L    Bilirubin, Total 0.5 0.0 - 1.2 mg/dL    ALT 58 (H) 7 - 45 U/L   POCT GLUCOSE   Result Value Ref Range    POCT Glucose 198 (H) 74 - 99 mg/dL       MRI Spine 10/9  IMPRESSION:  Cervical spine:  1. Mild multilevel degenerative changes of the cervical spine most  notable for moderate central canal and bilateral neural foraminal  stenosis at C5-C6. No abnormal cord signal at this level.  2. There is a heterogeneous enhancing 5.7 cm right thyroid mass as  described above with mild compression of the adjacent trachea and  posterior displacement of surrounding vasculature. The vessels are  patent. Recommend correlation with thyroid ultrasound.      Thoracic spine:  1. No significant central canal or neural foraminal stenosis.  2. 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  Recommend correlation with outside imaging to document stability. If  none available six-month follow-up scan can be obtained for further  evaluation.      Lumbar spine:  1. Multilevel degenerative changes without high-grade central canal  or neural foraminal stenosis.  2. Small left pleural effusion with adjacent opacity, likely  atelectasis.          Assessment/Plan   Principal Problem:    Muscle weakness  Active Problems:    Acute hypoxic respiratory failure (CMS/HCC)    Diabetes mellitus, type 2 (CMS/HCC)    62 yo F w/ PMHx primary biliary cirrhosis, HTN, GERD, DM, hx of breast cancer s/p lumpectomy and radiation, and osteoarthritis presented to [facility] on 9/25/23 for weakness and falls [facility] for further workup of acute hypoxic respiratory failure suspected s/t muscular weakness and rheumatologic/neurologic work-up.    Patient  reports she first started noticing weakness following a trip to [place]. On the 23rd of September she had a fall and later on the 25th she was found down after hours of being on the ground next to her bed, without losing consciousness. She was admitted to medicine for rhabdo (Cr 1.75, CK 2880). Hospital course c/b UTI, worsening SKIP 2/2 rhabdo (CK peaked at 11.773). On the 28th developed respiratory failure and got intubated and transferred to the ICU. Patient extubated 10/3. Transferred to floor 10/4 for further work-up of weakness.     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 Got first dose on IVIG 10/6 and started solu-medrol 60 IV daily per rheum. 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 IR-guided muscle biopsy completed 10/11.    Updates 10/12:  -pending G6PD for dapsone intiation for PJP ppx  -HR-CT and CT A/P completed  -extended myositis panel, HMG-CoAR Ab, T-spot and TPMT enzyme levels pending    #Proximal Muscle Weakness  #Necrotizing Myopathy  -EMG 10/6 demonstrates abnormal RUE/RLE w/ electrophysiologic evidence of a myopathy w/ fibrillation potentials consistent w/ a necrotizing myopathy  -MRI spine 10/10 most notable for T11 intraosseous hemangioma. Recommend six-month follow-up scan can be obtained for further evaluation.  -Rheumatology consulted, appreciate recs   -s/p IVIG 10/6   -Solu-medrol 60 mg IV daily  -IR-guided muscle biopsy completed 10/11  -MBS compelted 10/11, okay to progress to regular diet and mildly thick liquids  -pending G6PD for dapsone intiation for PJP ppx  -HR-CT and CT A/P completed  -extended myositis panel, HMG-CoAR Ab, T-spot and TPMT enzyme levels pending  -began Vit D and Ca supplementation per rheum  -daily trend for CPK, LFT's    #Tachycardia  -HR 110s-120s starting 10/4, normotensive-hypertensive  -ECG NSR  -TTE 10/5 w/ normal left ventricular  "systolic function, EF 65-70%. Slightly elevated RVSP  -TSH normal  -orthostats positive 10/11, bolused 500cc  -start mIVF high UOP suspected s/t diuretic phase of ATN    #Steroid-induced Hyperglycemia  #T2DM  -A1c 10/8 6.1  -hold home metformin  -mod SSI  -added NPH 5 BID 10/8  -hypoglycemia protocol    #SKIP, resolved  #Rhabdomyolysis   #HTN  -etiology of SKIP likely ATN s/t rhabdo  -CK elevated, 2k on admission -> peaked 11,773 (9/30) -> downtrending since w/ fluids  -Baseline Cr 0.7, Cr 1.75 on admission -> 2.5 -> 0.9 now  -Renal US 9/27 only significant for borderline increased cortical echogenicity  -UA (9/29): large blood, 3-5 RBCs, +protein, trace leuk est, 1+ bacteria, +casts  -urine myoglobin +   -started coreg 6.25 BID 10/6  -rae placed 10/6  -daily CK  -daily RFP    #Hx of urinary Frequency  #Urinary Rentention  -patient has bladder stimulating device placed in 2021 for \"frequent urination\"  -rae placed 10/6  -device has \"M-mode\" that is MRI safe    F: prn  E: replete prn  N: moist, minced diet with mod thickened liquids  GI ppx: PPI  DVT: SQH  Access: PIVs    Dispo: pending further work-up    Code status: full code (confirmed on floor transfer)     Lesly Parra MD      "

## 2023-10-12 NOTE — PROGRESS NOTES
Transitional Care Coordination Progress Note:  Patient discussed during interdisciplinary rounds.  Team members present: FIONA JASSO  Plan per Medical/Surgical team: Plan for muscle biopsy today.  Payer: Medicare A/B  Status: Inpatient  Discharge disposition: [facility]  Potential Barriers: none  ADOD: 10/13  Pt/ son are both aware PT is recommending high intensity therapy but still prefers [facility] because it's closer to home. Per son he already spoke with [facility] and they told him pt could potentially receive up to 3 hours of therapy at their facility. [facility] provided with updated notes and anticipated discharge date of tomorrow. Transport placed in [place] [facility] via [employer] for tomorrow. Care coordinator will continue to follow for discharge planning needs.     Luz Zarate RN  Transitional Care Coordinator/[facility]  [phone]

## 2023-10-13 LAB
ALBUMIN SERPL BCP-MCNC: 2.3 G/DL (ref 3.4–5)
ALP SERPL-CCNC: 69 U/L (ref 33–136)
ALT SERPL W P-5'-P-CCNC: 52 U/L (ref 7–45)
ANION GAP SERPL CALC-SCNC: 14 MMOL/L (ref 10–20)
AST SERPL W P-5'-P-CCNC: 36 U/L (ref 9–39)
BILIRUB SERPL-MCNC: 0.5 MG/DL (ref 0–1.2)
BUN SERPL-MCNC: 22 MG/DL (ref 6–23)
CALCIUM SERPL-MCNC: 7.7 MG/DL (ref 8.6–10.6)
CHLORIDE SERPL-SCNC: 102 MMOL/L (ref 98–107)
CO2 SERPL-SCNC: 27 MMOL/L (ref 21–32)
CREAT SERPL-MCNC: 0.74 MG/DL (ref 0.5–1.05)
ERYTHROCYTE [DISTWIDTH] IN BLOOD BY AUTOMATED COUNT: 13.4 % (ref 11.5–14.5)
G6PD RBC-CCNT: 14.4 U/G HB (ref 9.9–16.6)
GFR SERPL CREATININE-BSD FRML MDRD: 90 ML/MIN/1.73M*2
GLUCOSE BLD MANUAL STRIP-MCNC: 117 MG/DL (ref 74–99)
GLUCOSE BLD MANUAL STRIP-MCNC: 151 MG/DL (ref 74–99)
GLUCOSE BLD MANUAL STRIP-MCNC: 158 MG/DL (ref 74–99)
GLUCOSE BLD MANUAL STRIP-MCNC: 219 MG/DL (ref 74–99)
GLUCOSE SERPL-MCNC: 218 MG/DL (ref 74–99)
HCT VFR BLD AUTO: 30.7 % (ref 36–46)
HGB BLD-MCNC: 10.2 G/DL (ref 12–16)
MAGNESIUM SERPL-MCNC: 1.61 MG/DL (ref 1.6–2.4)
MCH RBC QN AUTO: 29.5 PG (ref 26–34)
MCHC RBC AUTO-ENTMCNC: 33.2 G/DL (ref 32–36)
MCV RBC AUTO: 89 FL (ref 80–100)
NRBC BLD-RTO: 0 /100 WBCS (ref 0–0)
PLATELET # BLD AUTO: 203 X10*3/UL (ref 150–450)
PMV BLD AUTO: 10.4 FL (ref 7.5–11.5)
POTASSIUM SERPL-SCNC: 4.6 MMOL/L (ref 3.5–5.3)
PROT SERPL-MCNC: 5.2 G/DL (ref 6.4–8.2)
RBC # BLD AUTO: 3.46 X10*6/UL (ref 4–5.2)
SODIUM SERPL-SCNC: 138 MMOL/L (ref 136–145)
WBC # BLD AUTO: 9.8 X10*3/UL (ref 4.4–11.3)

## 2023-10-13 PROCEDURE — 2500000001 HC RX 250 WO HCPCS SELF ADMINISTERED DRUGS (ALT 637 FOR MEDICARE OP)

## 2023-10-13 PROCEDURE — 2500000002 HC RX 250 W HCPCS SELF ADMINISTERED DRUGS (ALT 637 FOR MEDICARE OP, ALT 636 FOR OP/ED)

## 2023-10-13 PROCEDURE — 2500000004 HC RX 250 GENERAL PHARMACY W/ HCPCS (ALT 636 FOR OP/ED)

## 2023-10-13 PROCEDURE — 99232 SBSQ HOSP IP/OBS MODERATE 35: CPT

## 2023-10-13 PROCEDURE — 99232 SBSQ HOSP IP/OBS MODERATE 35: CPT | Performed by: INTERNAL MEDICINE

## 2023-10-13 PROCEDURE — 36415 COLL VENOUS BLD VENIPUNCTURE: CPT

## 2023-10-13 PROCEDURE — 2500000001 HC RX 250 WO HCPCS SELF ADMINISTERED DRUGS (ALT 637 FOR MEDICARE OP): Performed by: STUDENT IN AN ORGANIZED HEALTH CARE EDUCATION/TRAINING PROGRAM

## 2023-10-13 PROCEDURE — 86481 TB AG RESPONSE T-CELL SUSP: CPT | Mod: CMCLAB

## 2023-10-13 PROCEDURE — 96372 THER/PROPH/DIAG INJ SC/IM: CPT

## 2023-10-13 PROCEDURE — 80053 COMPREHEN METABOLIC PANEL: CPT | Mod: CMCLAB

## 2023-10-13 PROCEDURE — 97530 THERAPEUTIC ACTIVITIES: CPT | Mod: GO

## 2023-10-13 PROCEDURE — 1100000001 HC PRIVATE ROOM DAILY

## 2023-10-13 PROCEDURE — 85027 COMPLETE CBC AUTOMATED: CPT

## 2023-10-13 PROCEDURE — 82947 ASSAY GLUCOSE BLOOD QUANT: CPT

## 2023-10-13 PROCEDURE — 83735 ASSAY OF MAGNESIUM: CPT | Mod: CMCLAB

## 2023-10-13 PROCEDURE — 2580000001 HC RX 258 IV SOLUTIONS

## 2023-10-13 PROCEDURE — 97530 THERAPEUTIC ACTIVITIES: CPT | Mod: GP | Performed by: PHYSICAL THERAPIST

## 2023-10-13 RX ORDER — PREDNISONE 20 MG/1
60 TABLET ORAL DAILY
Qty: 90 TABLET | Refills: 0
Start: 2023-10-14 | End: 2023-11-06 | Stop reason: ALTCHOICE

## 2023-10-13 RX ORDER — POLYETHYLENE GLYCOL 3350 17 G/17G
17 POWDER, FOR SOLUTION ORAL ONCE
Status: COMPLETED | OUTPATIENT
Start: 2023-10-13 | End: 2023-10-13

## 2023-10-13 RX ORDER — BISACODYL 10 MG/1
10 SUPPOSITORY RECTAL ONCE
Status: COMPLETED | OUTPATIENT
Start: 2023-10-13 | End: 2023-10-13

## 2023-10-13 RX ORDER — INSULIN LISPRO 100 [IU]/ML
0-10 INJECTION, SOLUTION INTRAVENOUS; SUBCUTANEOUS
Qty: 9 ML | Refills: 0 | Status: SHIPPED | OUTPATIENT
Start: 2023-10-13 | End: 2023-11-12

## 2023-10-13 RX ORDER — CHOLECALCIFEROL (VITAMIN D3) 25 MCG
25 TABLET ORAL DAILY
Qty: 30 TABLET | Refills: 0
Start: 2023-10-14 | End: 2023-11-13

## 2023-10-13 RX ORDER — ATOVAQUONE 750 MG/5ML
1500 SUSPENSION ORAL DAILY
Status: DISCONTINUED | OUTPATIENT
Start: 2023-10-13 | End: 2023-10-14 | Stop reason: HOSPADM

## 2023-10-13 RX ORDER — MAGNESIUM SULFATE HEPTAHYDRATE 40 MG/ML
4 INJECTION, SOLUTION INTRAVENOUS ONCE
Status: COMPLETED | OUTPATIENT
Start: 2023-10-13 | End: 2023-10-13

## 2023-10-13 RX ORDER — PREDNISONE 20 MG/1
60 TABLET ORAL DAILY
Status: DISCONTINUED | OUTPATIENT
Start: 2023-10-14 | End: 2023-10-14 | Stop reason: HOSPADM

## 2023-10-13 RX ORDER — CALCIUM CARBONATE 200(500)MG
500 TABLET,CHEWABLE ORAL DAILY
Qty: 30 TABLET | Refills: 0
Start: 2023-10-14 | End: 2023-11-13

## 2023-10-13 RX ORDER — POLYETHYLENE GLYCOL 3350 17 G/17G
17 POWDER, FOR SOLUTION ORAL DAILY
Qty: 30 PACKET | Refills: 0
Start: 2023-10-14 | End: 2023-11-13

## 2023-10-13 RX ORDER — ACETAMINOPHEN 325 MG/1
650 TABLET ORAL EVERY 6 HOURS PRN
Status: DISCONTINUED | OUTPATIENT
Start: 2023-10-13 | End: 2023-10-14 | Stop reason: HOSPADM

## 2023-10-13 RX ORDER — ATOVAQUONE 750 MG/5ML
1500 SUSPENSION ORAL DAILY
Qty: 210 ML | Refills: 0
Start: 2023-10-13 | End: 2023-11-06 | Stop reason: SDUPTHER

## 2023-10-13 RX ORDER — PANTOPRAZOLE SODIUM 40 MG/1
40 TABLET, DELAYED RELEASE ORAL
Qty: 30 TABLET | Refills: 0 | Status: SHIPPED | OUTPATIENT
Start: 2023-10-14 | End: 2023-11-13

## 2023-10-13 RX ORDER — CARVEDILOL 3.12 MG/1
3.12 TABLET ORAL 2 TIMES DAILY
Qty: 60 TABLET | Refills: 0
Start: 2023-10-13 | End: 2024-01-11

## 2023-10-13 RX ORDER — AMOXICILLIN 250 MG
1 CAPSULE ORAL NIGHTLY
Qty: 30 TABLET | Refills: 0
Start: 2023-10-13 | End: 2023-11-12

## 2023-10-13 RX ADMIN — CARVEDILOL 3.12 MG: 3.12 TABLET, FILM COATED ORAL at 20:09

## 2023-10-13 RX ADMIN — MAGNESIUM SULFATE HEPTAHYDRATE 4 G: 40 INJECTION, SOLUTION INTRAVENOUS at 17:10

## 2023-10-13 RX ADMIN — INSULIN LISPRO 2 UNITS: 100 INJECTION, SOLUTION INTRAVENOUS; SUBCUTANEOUS at 12:46

## 2023-10-13 RX ADMIN — SENNOSIDES AND DOCUSATE SODIUM 1 TABLET: 50; 8.6 TABLET ORAL at 20:09

## 2023-10-13 RX ADMIN — ACETAMINOPHEN 650 MG: 325 TABLET ORAL at 06:19

## 2023-10-13 RX ADMIN — CALCIUM CARBONATE (ANTACID) CHEW TAB 500 MG 500 MG: 500 CHEW TAB at 10:07

## 2023-10-13 RX ADMIN — POLYETHYLENE GLYCOL 3350 17 G: 17 POWDER, FOR SOLUTION ORAL at 10:06

## 2023-10-13 RX ADMIN — HEPARIN SODIUM 5000 UNITS: 5000 INJECTION INTRAVENOUS; SUBCUTANEOUS at 14:32

## 2023-10-13 RX ADMIN — CARVEDILOL 3.12 MG: 3.12 TABLET, FILM COATED ORAL at 10:06

## 2023-10-13 RX ADMIN — GABAPENTIN 900 MG: 250 SOLUTION ORAL at 20:13

## 2023-10-13 RX ADMIN — HEPARIN SODIUM 5000 UNITS: 5000 INJECTION INTRAVENOUS; SUBCUTANEOUS at 22:00

## 2023-10-13 RX ADMIN — GABAPENTIN 300 MG: 250 SOLUTION ORAL at 06:19

## 2023-10-13 RX ADMIN — INSULIN HUMAN 5 UNITS: 100 INJECTION, SUSPENSION SUBCUTANEOUS at 17:33

## 2023-10-13 RX ADMIN — PANTOPRAZOLE SODIUM 40 MG: 40 TABLET, DELAYED RELEASE ORAL at 06:19

## 2023-10-13 RX ADMIN — Medication 25 MCG: at 10:06

## 2023-10-13 RX ADMIN — POLYETHYLENE GLYCOL 3350 17 G: 17 POWDER, FOR SOLUTION ORAL at 17:32

## 2023-10-13 RX ADMIN — BISACODYL 10 MG: 10 SUPPOSITORY RECTAL at 20:10

## 2023-10-13 RX ADMIN — INSULIN LISPRO 4 UNITS: 100 INJECTION, SOLUTION INTRAVENOUS; SUBCUTANEOUS at 10:07

## 2023-10-13 RX ADMIN — INSULIN HUMAN 5 UNITS: 100 INJECTION, SUSPENSION SUBCUTANEOUS at 10:07

## 2023-10-13 RX ADMIN — INSULIN LISPRO 2 UNITS: 100 INJECTION, SOLUTION INTRAVENOUS; SUBCUTANEOUS at 17:32

## 2023-10-13 RX ADMIN — HEPARIN SODIUM 5000 UNITS: 5000 INJECTION INTRAVENOUS; SUBCUTANEOUS at 06:19

## 2023-10-13 RX ADMIN — SODIUM CHLORIDE, POTASSIUM CHLORIDE, SODIUM LACTATE AND CALCIUM CHLORIDE 75 ML/HR: 600; 310; 30; 20 INJECTION, SOLUTION INTRAVENOUS at 17:10

## 2023-10-13 RX ADMIN — METHYLPREDNISOLONE SODIUM SUCCINATE 60 MG: 40 INJECTION, POWDER, FOR SOLUTION INTRAMUSCULAR; INTRAVENOUS at 20:09

## 2023-10-13 RX ADMIN — ATOVAQUONE 1500 MG: 750 SUSPENSION ORAL at 20:09

## 2023-10-13 RX ADMIN — Medication 5 MG: at 20:09

## 2023-10-13 ASSESSMENT — COGNITIVE AND FUNCTIONAL STATUS - GENERAL
MOVING TO AND FROM BED TO CHAIR: TOTAL
WALKING IN HOSPITAL ROOM: TOTAL
HELP NEEDED FOR BATHING: A LOT
DAILY ACTIVITIY SCORE: 14
TURNING FROM BACK TO SIDE WHILE IN FLAT BAD: TOTAL
DRESSING REGULAR UPPER BODY CLOTHING: A LITTLE
STANDING UP FROM CHAIR USING ARMS: TOTAL
TOILETING: TOTAL
MOBILITY SCORE: 7
PERSONAL GROOMING: A LITTLE
DRESSING REGULAR LOWER BODY CLOTHING: TOTAL
MOVING FROM LYING ON BACK TO SITTING ON SIDE OF FLAT BED WITH BEDRAILS: A LOT
CLIMB 3 TO 5 STEPS WITH RAILING: TOTAL

## 2023-10-13 ASSESSMENT — PAIN SCALES - GENERAL
PAINLEVEL_OUTOF10: 0 - NO PAIN

## 2023-10-13 ASSESSMENT — PAIN - FUNCTIONAL ASSESSMENT
PAIN_FUNCTIONAL_ASSESSMENT: 0-10

## 2023-10-13 ASSESSMENT — ACTIVITIES OF DAILY LIVING (ADL): HOME_MANAGEMENT_TIME_ENTRY: 5

## 2023-10-13 NOTE — PROGRESS NOTES
Occupational Therapy    Occupational Therapy Treatment    Name: Bisi Mckay  MRN: [id number]  : 1960  Date: 10/13/23  Time Calculation  Start Time: 1025  Stop Time: 1057  Time Calculation (min): 32 min    Assessment:  OT Assessment: Pt presents with decreased strength and AROM BUEs/LEs, decreased activity tolerance and reduced balance resulting in increased need for assist with ADLs, transfers and functional mobility. Pt would benefit from skilled OT to address deficits, maximize independence and facilitate return to PLOF.  Prognosis: Good  Barriers to Discharge:  (home alone)  Evaluation/Treatment Tolerance: Patient tolerated treatment well, Patient limited by fatigue  End of Session Communication: PCT/NA/CTA  End of Session Patient Position: Bed, 3 rail up, Alarm on  Plan:  Treatment Interventions: ADL retraining, Functional transfer training, UE strengthening/ROM, Endurance training, Compensatory technique education  OT Frequency: 4 times per week  OT Discharge Recommendations: High intensity level of continued care    Subjective   General:  OT Last Visit  OT Received On: 10/13/23  General  Reason for Referral: Weakness + fall, acute hypoxic respiratory failure  Referred By: Rose Cervantes  Past Medical History Relevant to Rehab: primary biliary cirrhosis, HTN, GERD, DM, breast cancer, OA, RA?, IBS  Missed Visit: Yes  Missed Visit Reason: Patient in a medical procedure  Family/Caregiver Present: No  Co-Treatment: PT  Co-Treatment Reason: cotx with PT for max safety with functional transfers/mobility  Prior to Session Communication: Bedside nurse  Patient Position Received: Bed, 3 rail up, Alarm on  Preferred Learning Style: verbal  General Comment: Pt pleasant and agreeable to participate, motiavted. Pt became lightheaded & dizzy post standing activity, returned to supine for safety.  Vitals:  Vital Signs  Heart Rate:  (seated at EOB: 84 bpm; supine at end of session: 79 bpm)  Heart Rate Source:  Monitor  SpO2: 97 %  BP: 115/52 (in supine)  MAP (mmHg): 73  Patient Position: Lying  Pain Assessment:  Pain Assessment  Pain Assessment: 0-10  Pain Score: 0 - No pain     Objective   Activities of Daily Living: Grooming  Grooming Level of Assistance:  (SBA)  Grooming Where Assessed: Edge of bed  Grooming Comments: Pt performed face hygiene seated at EOB with hand over hand assist d/t limited AROM R UE and faitgue.    Functional Standing Tolerance:  Functional Standing Tolerance  Time: 45 seconds, 25 seconds  Activity: To increase functional standing tolerance and overall activity tolerance needed for ADLs/transfers, patient stood at FWW with Min Ax2, 2 trials. Extended seated rest break to manage fatigue.  Functional Standing Tolerance Comments:  (Pt stood with Depx2. PT providing assist anteriorly + blocking B knees while this OT assisting posteriorly via draw sheet at hips.)  Bed Mobility/Transfers: Bed Mobility  Bed Mobility: Yes  Bed Mobility 1  Bed Mobility 1: Supine to sitting  Level of Assistance 1: Moderate assistance, Minimal verbal cues (x2)  Bed Mobility Comments 1: Cued for sequencing and hand placement, HOB elevated  Bed Mobility 2  Bed Mobility  2: Sitting to supine  Level of Assistance 2: Moderate assistance, Minimal verbal cues (x2)  Bed Mobility 3  Bed Mobility 3: Rolling left  Level of Assistance 3: Moderate assistance, Minimal verbal cues, Minimal tactile cues  Bed Mobility Comments 3: rolling done to place patient on bedpan  Bed Mobility 4  Bed Mobility 4: Scooting  Level of Assistance 4:  (depx2 to scoot up in bed while supine, CGA to scoot fwd seated at EOB)  Bed Mobility Comments 4: verbal and tactile cues for LE positioning to assist with roll  Bed Mobility 5  Bed Mobility 5: Scooting  Level of Assistance 5: Dependent (x2 to scoot up in bed while supine)    Transfers  Transfer: Yes  Transfer 1  Transfer From 1: Sit to  Transfer to 1: Stand  Technique 1: Sit to stand  Transfer Device 1:  Walker  Transfer Level of Assistance 1: Moderate assistance, +2, Minimal verbal cues  Trials/Comments 1: x2 from elevated EOB, required bilateral knee block to prevent buckling  Transfers 2  Transfer From 2: Stand to  Transfer to 2: Sit  Transfer Device 2: Walker  Transfer Level of Assistance 2: Moderate assistance, +2, Minimal verbal cues  Trials/Comments 2: x2     Therapy/Activity: Therapeutic Activity  Therapeutic Activity Performed: Yes  Therapeutic Activity 1: Pt tolerated sitting at EOB x10 minutes with varying assist of min-max Ax1. Verbal and tactile cues for anterior WS. Pt required increased assist to sustain sitting balance as she fatigued.    Balance/Neuromuscular Re-Education  Balance/Neuromuscular Re-Education Activity Performed: Yes  Balance/Neuromuscular Re-Education Activity 1: Pt tolerated sitting at EOB x15 minutes with SBA for static balance and CGA-Min A for dynamic balance. Min verbal & tactile cues for fwd weight shift to prevent posterior lean.    Strength:  Strength  Strength Comments: B UE 2-/5 (assess in supine)       Outcome Measures:  [facility] Daily Activity  Putting on and taking off regular lower body clothing: Total  Bathing (including washing, rinsing, drying): A lot  Putting on and taking off regular upper body clothing: A little  Toileting, which includes using toilet, bedpan or urinal: Total  Taking care of personal grooming such as brushing teeth: A little  Eating Meals: None  Daily Activity - Total Score: 14      Education Documentation  Body Mechanics, taught by Anna Calderon OT at 10/13/2023 11:51 AM.  Learner: Patient  Readiness: Acceptance  Method: Explanation, Demonstration  Response: Verbalizes Understanding, Needs Reinforcement    Precautions, taught by Anna Calderon OT at 10/13/2023 11:51 AM.  Learner: Patient  Readiness: Acceptance  Method: Explanation, Demonstration  Response: Verbalizes Understanding, Needs Reinforcement    ADL Training, taught by Anna Calderon OT  at 10/13/2023 11:51 AM.  Learner: Patient  Readiness: Acceptance  Method: Explanation, Demonstration  Response: Verbalizes Understanding, Needs Reinforcement    Education Comments  No comments found.      Goals:  Encounter Problems       Encounter Problems (Active)       ADLs       Patient will perform UB and LB bathing with minimal assist  level of assistance. (Progressing)       Start:  10/05/23    Expected End:  10/19/23            Patient with complete upper body dressing with minimal assist  level of assistance donning and doffing all UE clothes with PRN adaptive equipment while edge of bed  (Progressing)       Start:  10/05/23    Expected End:  10/19/23            Patient with complete lower body dressing with minimal assist  level of assistance donning and doffing all LE clothes  with PRN adaptive equipment (Progressing)       Start:  10/05/23    Expected End:  10/19/23            Patient will complete daily grooming tasks brushing teeth and washing face/hair with minimal assist  level of assistance and PRN adaptive equipment while edge of bed . (Progressing)       Start:  10/05/23    Expected End:  10/19/23            Patient will complete toileting including hygiene clothing management/hygiene with minimal assist  level of assistance and bedside commode. (Progressing)       Start:  10/05/23    Expected End:  10/19/23               BALANCE       Patientt will maintain static standing balance during ADL task with minimal assist  level of assistance drop down in order to demonstrate decreased risk of falling and improved postural control. (Progressing)       Start:  10/05/23    Expected End:  10/19/23                           TRANSFERS       Patient will perform bed mobility minimal assist  level of assistance and bed rails in order to improve safety and independence with mobility (Progressing)       Start:  10/05/23    Expected End:  10/19/23            Patient will complete functional transfer to  sit<>stand, toilet, chair with least restrictive device with minimal assist  level of assistance. (Progressing)       Start:  10/05/23    Expected End:  10/19/23

## 2023-10-13 NOTE — PROGRESS NOTES
Discharge Transfer to Facility 10/14/2023  Patient will discharge today to: SNF  Facility name: [facility]   Facility phone number: [phone]   [facility] [place] aware.  Bedside nurse (name) aware to call report: Luna (to pass info in N2N report)  Phone number for report: [phone]   Ambulance transport has been arranged.  Ambulance Company name: [facility]  [facility] phone number: [phone]   time: 1000  Patient and family [phone] aware of transport time  Patient has been approved for discharge after 8pm: n/a  Medical team and facility updated on transport time. Team aware discharge orders and attending signature needed for completion of 7000 form. Unit secretary provided with blue transfer slip. Care coordinator will continue to follow for discharge planning needs.    Luz Zarate RN  Transitional Care Coordinator/[facility]  [phone]

## 2023-10-13 NOTE — PROGRESS NOTES
"Bisi Mckay is a 63 y.o. female on day 12 of admission presenting with Muscle weakness.    Subjective   Bisi Mckay is a 63 y.o. female on day 12 of admission presenting with Muscle weakness.    Subjective   Patient stable overall with no new complaints. Reports strength is improving in her upper and lower arms.  No difficulty swallowing, no fever or other complaints.        Objective     Physical Exam  M/P Upper proximal arms 5/5 bilaterally   Distal upper arm 5/5   Hip flexors 3/5, 4+/5 with extension, abduction and adduction   No new skin rashes    Last Recorded Vitals  Blood pressure 143/61, pulse 87, temperature 36.6 °C (97.9 °F), resp. rate 16, height 1.651 m (5' 5\"), weight 106 kg (233 lb 7.5 oz), SpO2 98 %.  Intake/Output last 3 Shifts:  I/O last 3 completed shifts:  In: 2522.5 (23.8 mL/kg) [P.O.:720; I.V.:1802.5 (17 mL/kg)]  Out: 5650 (53.4 mL/kg) [Urine:5650 (1.5 mL/kg/hr)]  Weight: 105.9 kg                 Assessment/Plan   Bisi Mckay is a 63 y.o. female with PMHx of primary biliary cirrhosis, HTN, GERD, DM, breast cancer, and osteoarthritis, who initially presented to [facility] on 9/25 for weakness and fall. She was then transferred to  [facility] for acute hypoxic respiratory failure and rheumatologic/neuromuscular consult.  Patient was noted to have bilateral symmetric proximal weakness involving the deltoid and hip flexors, with dysphagia, and later developed acute hypoxic respiratory failure. No evident ILD changes on xray( no CT chest), no inflammatory arthritis, rashes or Raynaud's.  Her CPK on admission 2K, peaked at 11K and then down trended now to 422 with IV hydration.     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   Extended myositis " panel, HMG-co reductase are still pending.      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   Getting a muscle biopsy and the extended myositis panel would identify which type of Myopathy this is and would better guide our immunosuppressive treatment plan.      CPK normalized, her upper extremity muscle strength is better, however her LE strength is still weak with minimal improvement.       Plan:  -proximal muscle biopsy result pending  -Continue Solu-medrol 60 mg IV daily--> on discharge please switch to 60 mg of prednisone daily( risks,benefits and alternatives discussed and patient is agreeable to continue steroids)  -Received IVIG 2g/Kg infusion once on 10/6/2023, given the severity of her presentation, to be redosed in a month(we will arrange for that)  -we will decide on a steroid sparing regimen once we have a better characterization of the inflammatory myopathy ( Rituximab vs csDMARD such as azathioprine. Methotrexate likely not an option with her PBC)   -Pending TB-spot, TPMT enzyme   -Pending EMP and HMG-co reductase ab   -Please start patient on Vitamin D and calcium as bone protective measures  -PJP ppx per primary team  -Continue physical therapy  -Please request an outpatient follow up in 3-4 weeks after discharge with me at [facility] on a Tuesday     Marsha Becerra MD  Rheumatology Fellow,PGY-V    Patient seen with Dr. Lezama    Rheumatology Attending    I interviewed and examined the patient and discussed the treatment plan. See above note for details.  I agree with the findings and plan of care summarized in the above note.       Shar Lezama MD

## 2023-10-13 NOTE — PROGRESS NOTES
Physical Therapy    Physical Therapy Treatment    Patient Name: Bisi Mckay  MRN: [id number]  Today's Date: 10/13/2023  Time Calculation  Start Time: 1024  Stop Time: 1057  Time Calculation (min): 33 min       Assessment/Plan   PT Assessment  PT Assessment Results: Decreased strength, Impaired balance, Decreased mobility  Rehab Prognosis: Good  End of Session Communication: Bedside nurse  Assessment Comment: pt continues to be motivated and with improved resutts today with decrease assist for mobility  End of Session Patient Position: Bed, 3 rail up, Alarm off, not on at start of session  PT Plan  Inpatient/Swing Bed or Outpatient: Inpatient  PT Plan  Treatment/Interventions: Bed mobility, Transfer training, Gait training, Balance training, Neuromuscular re-education, Strengthening, Therapeutic exercise, Therapeutic activity, Positioning, Postural re-education  PT Plan: Skilled PT  PT Frequency: 4 times per week  PT Discharge Recommendations: High intensity level of continued care  PT - OK to Discharge: Yes      General Visit Information:   PT  Visit  PT Received On: 10/13/23  General  Reason for Referral: Acute hypoxic respiratory failure, weakness, fall  Co-Treatment: OT  Co-Treatment Reason: Cotx with OT required due to skilled technique required for safe functional transfers and to facilitate maximum participation with skilled intervention.  Prior to Session Communication: Bedside nurse  Patient Position Received: Bed, 3 rail up, Alarm on  General Comment: Pt supine in bed upon arrival, plesant and agreeble to participate in Therapy session. Motivated. Participated fully as able. Pt with c/o dizziness with after standing which resolved lying down. Demonstarted improved mobility this date.    Subjective   Precautions:  Precautions  Hearing/Visual Limitations: glasses full time  Medical Precautions: Fall precautions  Vital Signs:  Vital Signs  Heart Rate: 79  SpO2: 97 %  BP: 115/52  Patient Position:  (supine  after sit>stand trials)    Objective   Pain:  Pain Assessment  Pain Assessment: 0-10  Pain Score: 0 - No pain  Cognition:  Cognition  Overall Cognitive Status: Within Functional Limits  Orientation Level: Oriented X4     Treatments:  Therapeutic Activity  Therapeutic Activity Performed: Yes  Therapeutic Activity 1: Pt tolerated seated EOB 12 mins for 15 mins with SBA-Jeanne  Therapeutic Activity 2: Pt tolerated static standing for 45 secs and 25 secs with MinAx2 with BUE supported on walker    Bed Mobility 1  Bed Mobility 1: Supine to sitting  Level of Assistance 1: Moderate assistance (x2)  Bed Mobility Comments 1: HOB elevated, bedrail  Bed Mobility 2  Bed Mobility  2: Sitting to supine  Level of Assistance 2: Moderate assistance (x2)  Bed Mobility Comments 2: HOB elevated, bedrail  Bed Mobility 3  Bed Mobility 3: Scooting  Level of Assistance 3:  (CGA for fwd scooting and Depx2 for boost to HOB)  Bed Mobility 4  Bed Mobility 4: Rolling left  Level of Assistance 4: Moderate assistance    Transfer 1  Transfer From 1: Sit to  Transfer to 1: Stand  Technique 1: Sit to stand, Stand to sit  Transfer Device 1: Walker  Transfer Level of Assistance 1: Moderate assistance, +2  Trials/Comments 1: x2 trials from elevated EOB. Cues fros sequencing. Required B feet/knee block.  Transfers 2  Transfer From 2: Sit to, Stand to  Transfer to 2: Stand, Sit  Transfer Device 2: Walker  Transfer Level of Assistance 2: +2, Minimum assistance    Outcome Measures:  [facility] Basic Mobility  Turning from your back to your side while in a flat bed without using bedrails: A lot  Moving from lying on your back to sitting on the side of a flat bed without using bedrails: Total  Moving to and from bed to chair (including a wheelchair): Total  Standing up from a chair using your arms (e.g. wheelchair or bedside chair): Total  To walk in hospital room: Total  Climbing 3-5 steps with railing: Total  Basic Mobility - Total Score: 7    Education  Documentation  No documentation found.  Education Comments  No comments found.        OP EDUCATION:  Education  Individual(s) Educated: Patient  Education Provided:  (Pt education on PT POC, use of walker, safe transfer techniqus, seated/supine LE exs and fall precevntion.)    Encounter Problems       Encounter Problems (Active)       Balance       patient will complete static (Cgx1) and dynamic (MINx1) sitting balance activities using UE support as needed without acute LOB in order to maintain midline (Progressing)       Start:  10/04/23    Expected End:  10/18/23               Mobility       patient will ambulate >/=3 ft in order to complete functional tranfers without acute LOB with MAXx2 assist.  (Progressing)       Start:  10/04/23    Expected End:  10/18/23               Mobility       patient will participate in BLE there-ex in order to assist in improving strength and to assist with the completion of functional mobility tasks  (Progressing)       Start:  10/04/23    Expected End:  10/18/23               Pain - Adult          Transfers       Patient will complete STS with MODx2 without acute LOB  (Progressing)       Start:  10/04/23    Expected End:  10/18/23            patient will complete supine<->sit with MODx2 with bedrail as needed without acute LOB  (Progressing)       Start:  10/04/23    Expected End:  10/18/23

## 2023-10-14 VITALS
HEIGHT: 65 IN | BODY MASS INDEX: 38.9 KG/M2 | SYSTOLIC BLOOD PRESSURE: 133 MMHG | RESPIRATION RATE: 16 BRPM | TEMPERATURE: 97.5 F | HEART RATE: 82 BPM | WEIGHT: 233.47 LBS | OXYGEN SATURATION: 95 % | DIASTOLIC BLOOD PRESSURE: 64 MMHG

## 2023-10-14 LAB
ANA SER QL: NEGATIVE
ANNOTATION COMMENT IMP: NORMAL
EJ AB SER QL: NEGATIVE
ENA JO1 IGG SER-ACNC: 0 AU/ML (ref 0–40)
ENA SS-A 60KD AB SER-ACNC: 4 AU/ML (ref 0–40)
ENA SS-A IGG SER QL: 3 AU/ML (ref 0–40)
FIBRILLARIN AB SER QL: NEGATIVE
GLUCOSE BLD MANUAL STRIP-MCNC: 167 MG/DL (ref 74–99)
HMGCR IGG SER IA-ACNC: <3 UNITS (ref 0–19)
KU AB SER QL: NEGATIVE
MDA5 AB SER QL LINE BLOT: NEGATIVE
MI2 AB SER QL: NEGATIVE
MJ AB SER QL LINE BLOT: NEGATIVE
OJ AB SER QL: NEGATIVE
PL12 AB SER QL: NEGATIVE
PL7 AB SER QL: NEGATIVE
PM/SCL-100 AB SER QL LINE BLOT: NEGATIVE
SAE1 AB SER QL LINE BLOT: NEGATIVE
SRP AB SERPL QL: NEGATIVE
TIF1-GAMMA AB SER QL LINE BLOT: NEGATIVE
U1 SNRNP IGG SER-ACNC: 1 UNITS (ref 0–19)

## 2023-10-14 PROCEDURE — 96372 THER/PROPH/DIAG INJ SC/IM: CPT

## 2023-10-14 PROCEDURE — 2500000004 HC RX 250 GENERAL PHARMACY W/ HCPCS (ALT 636 FOR OP/ED)

## 2023-10-14 PROCEDURE — 2580000001 HC RX 258 IV SOLUTIONS

## 2023-10-14 PROCEDURE — 2500000001 HC RX 250 WO HCPCS SELF ADMINISTERED DRUGS (ALT 637 FOR MEDICARE OP)

## 2023-10-14 PROCEDURE — 82947 ASSAY GLUCOSE BLOOD QUANT: CPT

## 2023-10-14 PROCEDURE — 2500000002 HC RX 250 W HCPCS SELF ADMINISTERED DRUGS (ALT 637 FOR MEDICARE OP, ALT 636 FOR OP/ED)

## 2023-10-14 PROCEDURE — 2500000001 HC RX 250 WO HCPCS SELF ADMINISTERED DRUGS (ALT 637 FOR MEDICARE OP): Performed by: STUDENT IN AN ORGANIZED HEALTH CARE EDUCATION/TRAINING PROGRAM

## 2023-10-14 PROCEDURE — 99238 HOSP IP/OBS DSCHRG MGMT 30/<: CPT

## 2023-10-14 RX ORDER — HEPARIN SODIUM 5000 [USP'U]/ML
5000 INJECTION, SOLUTION INTRAVENOUS; SUBCUTANEOUS EVERY 8 HOURS
Start: 2023-10-14 | End: 2023-10-14 | Stop reason: SDUPTHER

## 2023-10-14 RX ORDER — HEPARIN SODIUM 5000 [USP'U]/ML
5000 INJECTION, SOLUTION INTRAVENOUS; SUBCUTANEOUS EVERY 8 HOURS
Start: 2023-10-14 | End: 2023-11-13

## 2023-10-14 RX ADMIN — Medication 25 MCG: at 09:04

## 2023-10-14 RX ADMIN — CARVEDILOL 3.12 MG: 3.12 TABLET, FILM COATED ORAL at 09:04

## 2023-10-14 RX ADMIN — INSULIN LISPRO 2 UNITS: 100 INJECTION, SOLUTION INTRAVENOUS; SUBCUTANEOUS at 09:08

## 2023-10-14 RX ADMIN — SODIUM CHLORIDE, POTASSIUM CHLORIDE, SODIUM LACTATE AND CALCIUM CHLORIDE 75 ML/HR: 600; 310; 30; 20 INJECTION, SOLUTION INTRAVENOUS at 05:53

## 2023-10-14 RX ADMIN — PANTOPRAZOLE SODIUM 40 MG: 40 TABLET, DELAYED RELEASE ORAL at 09:13

## 2023-10-14 RX ADMIN — INSULIN HUMAN 5 UNITS: 100 INJECTION, SUSPENSION SUBCUTANEOUS at 09:08

## 2023-10-14 RX ADMIN — POLYETHYLENE GLYCOL 3350 17 G: 17 POWDER, FOR SOLUTION ORAL at 09:04

## 2023-10-14 RX ADMIN — GABAPENTIN 300 MG: 250 SOLUTION ORAL at 09:04

## 2023-10-14 RX ADMIN — HEPARIN SODIUM 5000 UNITS: 5000 INJECTION INTRAVENOUS; SUBCUTANEOUS at 05:53

## 2023-10-14 RX ADMIN — CALCIUM CARBONATE (ANTACID) CHEW TAB 500 MG 500 MG: 500 CHEW TAB at 09:04

## 2023-10-14 ASSESSMENT — PAIN SCALES - GENERAL: PAINLEVEL_OUTOF10: 0 - NO PAIN

## 2023-10-14 ASSESSMENT — PAIN - FUNCTIONAL ASSESSMENT: PAIN_FUNCTIONAL_ASSESSMENT: 0-10

## 2023-10-14 NOTE — DISCHARGE SUMMARY
Discharge Diagnosis  Muscle weakness    Issues Requiring Follow-Up  PCP - DM, HTN  Rheum - biopsy results, extended myositis panel, HMG-CoAR Ab, T-spot and TPMT enzyme levels, further management of inflammatory myopathy  Neuromuscular - HMG-CoAR Ab results, further management of inflammatory myopathy    Test Results Pending At Discharge  Pending Labs       Order Current Status    Aldolase Collected (10/03/23 1236)    C-reactive protein Collected (10/03/23 1236)    C. difficile, PCR Collected (10/04/23 0845)    Creatine Kinase Collected (10/03/23 1236)    Extended Myositis Panel Collected (10/03/23 1236)    Extra Urine Gray Tube Collected (10/04/23 0843)    HMGCR AB, IgG Collected (10/03/23 1236)    Extended Myositis Panel In process    Fentanyl Confirmation, Urine In process    Surgical Pathology Exam In process    T-Spot TB In process    Thiopurine Metabolites In process    Urinalysis with Reflex Microscopic and Culture In process            Hospital Course  62 yo F w/ PMHx primary biliary cirrhosis, HTN, GERD, DM, hx of breast cancer s/p lumpectomy and radiation, and osteoarthritis presented to [facility] on 9/25/23 for weakness and falls [facility] for further workup of acute hypoxic respiratory failure suspected s/t muscular weakness and rheumatologic/neurologic work-up.    Patient reports she first started noticing weakness following a trip to [place]. On the 23rd of September she had a fall and later on the 25th she was found down after hours of being on the ground next to her bed, without losing consciousness. She was admitted to medicine for rhabdo (Cr 1.75, CK 2880). Hospital course c/b UTI, worsening SKIP 2/2 rhabdo (CK peaked at 11.773). On the 28th developed respiratory failure and got intubated and transferred to the ICU. Patient extubated 10/3. Transferred to floor 10/4 for further work-up of weakness.     EMG completed 10/6: electrophysiologic evidence of a myopathy w/ fibrillation  potentials consistent w/ a necrotizing myopathy. Got first dose on IVIG 10/6 and started solu-medrol 60 IV daily per rheum. 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 IR-guided muscle biopsy completed 10/11. Patient discharged with significant improvement in upper extremity strength. HR chest CT and CT A/P completed for screening prior to initiation of further immunosuppression. She will continue steroids outpatient and follow-up with neuromuscular and rheumatology for further management.    Follow-up:  PCP- DM, HTN  Rheum- biopsy results, extended myositis panel, HMG-CoAR Ab, T-spot and TPMT enzyme levels, further management of inflammatory myopathy  Neuro- HMG-CoAR Ab results, further management of inflammatory myopathy    Pertinent Physical Exam At Time of Discharge  Physical Exam  Constitutional:       Appearance: Normal appearance.   HENT:      Head: Normocephalic and atraumatic.   Eyes:      General: No scleral icterus.     Extraocular Movements: Extraocular movements intact.      Pupils: Pupils are equal, round, and reactive to light.   Cardiovascular:      Rate and Rhythm: Normal rate and regular rhythm.      Heart sounds: No murmur heard.     No friction rub. No gallop.   Pulmonary:      Breath sounds: Normal breath sounds. No wheezing, rhonchi or rales.   Abdominal:      General: Bowel sounds are normal. There is no distension.      Palpations: Abdomen is soft.      Tenderness: There is no abdominal tenderness. There is no guarding.   Neurological:      General: No focal deficit present.      Mental Status: She is alert and oriented to person, place, and time.      Motor: Weakness present. No tremor.   Psychiatric:         Mood and Affect: Mood normal.         Behavior: Behavior normal.         Home Medications     Medication List      START taking these medications     atovaquone 750 mg/5 mL suspension; Commonly known as: Mepron; Take 10 mL   (1,500 mg) by mouth once  daily for 21 days.   calcium carbonate 200 mg calcium chewable tablet; Commonly known as:   Tums; Chew 1 tablet (500 mg) once daily. Do not start before October 14, 2023.   carvedilol 3.125 mg tablet; Commonly known as: Coreg; Take 1 tablet   (3.125 mg) by mouth 2 times a day.   cholecalciferol 25 MCG (1000 UT) tablet; Commonly known as: Vitamin D-3;   Take 1 tablet (25 mcg) by mouth once daily. Do not start before October 14, 2023.   glucagon 1 mg injection; Commonly known as: Glucagen; Inject 1 mg into   the muscle every 15 minutes if needed for low blood sugar - see comments   (For blood glucose less than or equal to 70 mg/dL and no IV access) for up   to 13 doses.   heparin (porcine) 5,000 unit/mL injection; Inject 1 mL (5,000 Units)   under the skin every 8 hours.   insulin lispro 100 unit/mL injection; Commonly known as: HumaLOG; Inject   0-0.1 mL (0-10 Units) under the skin 3 times a day with meals. Take as   directed per insulin instructions.   insulin NPH (Isophane) 100 unit/mL injection; Commonly known as: HumuLIN   N,NovoLIN N; Inject 5 Units under the skin 2 times a day before meals.   Take as directed per insulin instructions.   pantoprazole 40 mg EC tablet; Commonly known as: ProtoNix; Take 1 tablet   (40 mg) by mouth once daily in the morning. Take before meals. Do not   crush, chew, or split. Do not start before October 14, 2023.   polyethylene glycol 17 gram packet; Commonly known as: Glycolax,   Miralax; Take 17 g by mouth once daily. Do not start before October 14, 2023.   predniSONE 20 mg tablet; Commonly known as: Deltasone; Take 3 tablets   (60 mg) by mouth once daily. Do not start before October 14, 2023.   sennosides-docusate sodium 8.6-50 mg tablet; Commonly known as:   Ramila-Colace; Take 1 tablet by mouth once daily at bedtime.     CONTINUE taking these medications     cevimeline 30 mg capsule; Commonly known as: Evoxac   gabapentin 300 mg capsule; Commonly known as: Neurontin    hyoscyamine 0.125 mg tablet; Commonly known as: Anaspaz, Levsin   plecanatide tablet tablet; Commonly known as: Trulance   ursodiol 500 mg tablet; Commonly known as: Actigall     STOP taking these medications     dexlansoprazole 60 mg DR capsule; Commonly known as: Dexilant   metFORMIN  mg 24 hr tablet; Commonly known as: Glucophage-XR   spironolactone 50 mg tablet; Commonly known as: Aldactone       Outpatient Follow-Up  No future appointments.    Taurus Dunn MD

## 2023-10-14 NOTE — DISCHARGE INSTRUCTIONS
Dear MsSuzie Nely,     You were admitted to the hospital for weakness and fall. You were seen by the neurology and rheumatology specialists for proximal weakness and had an extensive work-up including an EMG which revealed the cause of your weakness to be inflammatory muscle disease. You were started on steroid and got the first dose of intravenous immunoglobulin to help decrease the inflammatory process. You will continue the steroids until you see rheumatology for follow-up. You will be discharged to a skilled nursing facility to continue working on regain your strength. You will follow-up with neuromuscular and rheumatology as well as your PCP on discharge.    Medication changes:  Begin taking 60mg prednisone daily until you follow-up with rheumatology.  Stop taking metformin  Stop taking spironolactone  Start taking carvedilol 3.125mg twice daily, 12 hours apart.   Start talking calcium carbonate 1 tab and vitamin D one tab daily.  Start taking NPH (insulin injection) 5u injected into the skin of the abdomen or thigh twice daily before breakfast and dinner.  Start taking insulin lispro before meals as per the dosing guide provided  Start taking pantoprazole 40mg daily    Appointments:  We have requested follow-up with neuromuscular for you  We have requested follow-up with rheumatology for you   Please request an appointment with your PCP provider for one to two weeks after discharge    If you have not heard back about scheduling within one week, please call [phone]

## 2023-10-14 NOTE — PROGRESS NOTES
Updated goldenrod submitted to [facility] via [facility] and confirmed transport for 1000 this morning that was submitted yesterday. Transport confirmed with medical team, nursing, and pt as well. Care coordinator will continue to follow for discharge planning needs.    Luz Zarate RN  Transitional Care Coordinator/TCC  [id number]

## 2023-10-14 NOTE — NURSING NOTE
Patient discharged to [facility].  Report called to [place].  IVs removed.  Belongings with patient.

## 2023-10-14 NOTE — PROGRESS NOTES
"Bisi Mckay is a 63 y.o. female on day 12 of admission presenting with Muscle weakness.    Subjective   NAEO. No concerns this AM    Objective     Physical Exam  Vitals reviewed.   Constitutional:       Comments: Laying comfortably in bed   HENT:      Head: Normocephalic and atraumatic.      Mouth/Throat:      Mouth: Mucous membranes are moist.   Eyes:      Extraocular Movements: Extraocular movements intact.   Pulmonary:      Effort: Pulmonary effort is normal.      Breath sounds: No stridor. No wheezing, rhonchi or rales.   Abdominal:      General: Abdomen is flat.      Palpations: Abdomen is soft.      Tenderness: There is no abdominal tenderness.   Musculoskeletal:         General: No swelling or deformity.      Cervical back: Normal range of motion.   Skin:     General: Skin is warm.      Findings: Bruising present. No rash.   Neurological:      Mental Status: She is alert and oriented to person, place, and time.      Cranial Nerves: Cranial nerves 2-12 are intact. No dysarthria or facial asymmetry.      Sensory: No sensory deficit.      Motor: No tremor.      Comments: LE strength 2/5. UE strength 4+/5. Sensation intact diffusely         Last Recorded Vitals  Blood pressure 143/61, pulse 87, temperature 36.6 °C (97.9 °F), resp. rate 16, height 1.651 m (5' 5\"), weight 106 kg (233 lb 7.5 oz), SpO2 98 %.  Intake/Output last 3 Shifts:  I/O last 3 completed shifts:  In: 1542.5 (14.6 mL/kg) [P.O.:640; I.V.:902.5 (8.5 mL/kg)]  Out: 5600 (52.9 mL/kg) [Urine:5600 (1.5 mL/kg/hr)]  Weight: 105.9 kg     Relevant Results    Current Facility-Administered Medications:     acetaminophen (Tylenol) tablet 650 mg, 650 mg, oral, q6h PRN, Reina Lew MD, 650 mg at 10/13/23 0619    atovaquone (Mepron) suspension 1,500 mg, 1,500 mg, oral, Daily, Lesly Parra MD, 1,500 mg at 10/13/23 2009    barium sulfate (Varibar Honey) 40 % (w/v) 29% (w/w) suspension 10 mL, 10 mL, oral, Once in imaging, Carie Rodriguez MD    " barium sulfate (Varibar Nectar, Varibar Honey) 40 % (w/v) suspension 20 mL, 20 mL, oral, Once in imaging, Carie Rodriguez MD    barium sulfate (Varibar Pudding) 40 % (w/v), 30% (w/w) oral paste 20 mL, 20 mL, oral, Once in imaging, Carie Rodriguez MD    barium sulfate (Varibar THIN Liquid) 81 % (w/w) suspension 2 mL, 2 mL, oral, Once in imaging, Carie Rodriguez MD    calcium carbonate (Tums) chewable tablet 500 mg, 500 mg, oral, Daily, Lesly Parra MD, 500 mg at 10/13/23 1007    carvedilol (Coreg) tablet 3.125 mg, 3.125 mg, oral, BID, Ольга Saleh MD, 3.125 mg at 10/13/23 2009    cholecalciferol (Vitamin D-3) tablet 25 mcg, 25 mcg, oral, Daily, Lesly Parra MD, 25 mcg at 10/13/23 1006    dextrose 10 % in water (D10W) infusion, 0.3 g/kg/hr, intravenous, Once PRN, Lesly Parra MD    dextrose 50 % injection 25 g, 25 g, intravenous, q15 min PRN, Lesly Parra MD    flu vacc [id number] (65yr up)-PF (Fluzone High-Dose Quad) syringe 0.7 mL, 0.7 mL, intramuscular, During hospitalization, Alysa Castro MD    gabapentin (Neurontin) solution 300 mg, 300 mg, oral, Daily before breakfast, Alysa Castro MD, 300 mg at 10/13/23 0619    gabapentin (Neurontin) solution 900 mg, 900 mg, oral, Nightly, Lesly Parra MD, 900 mg at 10/13/23 2013    glucagon (Glucagen) injection 1 mg, 1 mg, intramuscular, q15 min PRN, Lesly Parra MD    heparin (porcine) injection 5,000 Units, 5,000 Units, subcutaneous, q8h, Lesly Parra MD, 5,000 Units at 10/13/23 1432    insulin lispro (HumaLOG) injection 0-10 Units, 0-10 Units, subcutaneous, TID with meals, Lesly Parra MD, 2 Units at 10/13/23 1732    insulin NPH (Isophane) (HumuLIN N,NovoLIN N) injection 5 Units, 5 Units, subcutaneous, BID AC, Lesly Parra MD, 5 Units at 10/13/23 1733    lactated Ringer's infusion, 75 mL/hr, intravenous, Continuous, Lesly Parra MD, Last Rate: 75 mL/hr at 10/13/23 1710, 75 mL/hr at  10/13/23 1710    melatonin tablet 5 mg, 5 mg, oral, Nightly, Taurus Dunn MD, 5 mg at 10/13/23 2009    pantoprazole (ProtoNix) EC tablet 40 mg, 40 mg, oral, Daily before breakfast, Lesly Parra MD, 40 mg at 10/13/23 0619    perflutren protein A microsphere (Optison) injection 0.5 mL, 0.5 mL, intravenous, Once in imaging, Lesly Parra MD    pneumococcal conjugate (Prevnar 13) 0.5 mL vaccine 0.5 mL, 0.5 mL, intramuscular, During hospitalization, Alysa Castro MD    polyethylene glycol (Glycolax, Miralax) packet 17 g, 17 g, oral, Daily, Taurus Dunn MD, 17 g at 10/13/23 1006    [START ON 10/14/2023] predniSONE (Deltasone) tablet 60 mg, 60 mg, oral, Daily, Lesly Parra MD    sennosides-docusate sodium (Ramila-Colace) 8.6-50 mg per tablet 1 tablet, 1 tablet, oral, Nightly, Alysa Castro MD, 1 tablet at 10/13/23 2009    sulfur hexafluoride microsphr (Lumason) injection 24.28 mg, 2 mL, intravenous, Once in imaging, Lesly Parra MD     Results for orders placed or performed during the hospital encounter of 10/01/23 (from the past 24 hour(s))   POCT GLUCOSE   Result Value Ref Range    POCT Glucose 136 (H) 74 - 99 mg/dL   POCT GLUCOSE   Result Value Ref Range    POCT Glucose 219 (H) 74 - 99 mg/dL   Magnesium   Result Value Ref Range    Magnesium 1.61 1.60 - 2.40 mg/dL   CBC   Result Value Ref Range    WBC 9.8 4.4 - 11.3 x10*3/uL    nRBC 0.0 0.0 - 0.0 /100 WBCs    RBC 3.46 (L) 4.00 - 5.20 x10*6/uL    Hemoglobin 10.2 (L) 12.0 - 16.0 g/dL    Hematocrit 30.7 (L) 36.0 - 46.0 %    MCV 89 80 - 100 fL    MCH 29.5 26.0 - 34.0 pg    MCHC 33.2 32.0 - 36.0 g/dL    RDW 13.4 11.5 - 14.5 %    Platelets 203 150 - 450 x10*3/uL    MPV 10.4 7.5 - 11.5 fL   Comprehensive metabolic panel   Result Value Ref Range    Glucose 218 (H) 74 - 99 mg/dL    Sodium 138 136 - 145 mmol/L    Potassium 4.6 3.5 - 5.3 mmol/L    Chloride 102 98 - 107 mmol/L    Bicarbonate 27 21 - 32 mmol/L    Anion Gap 14 10 - 20  mmol/L    Urea Nitrogen 22 6 - 23 mg/dL    Creatinine 0.74 0.50 - 1.05 mg/dL    eGFR 90 >60 mL/min/1.73m*2    Calcium 7.7 (L) 8.6 - 10.6 mg/dL    Albumin 2.3 (L) 3.4 - 5.0 g/dL    Alkaline Phosphatase 69 33 - 136 U/L    Total Protein 5.2 (L) 6.4 - 8.2 g/dL    AST 36 9 - 39 U/L    Bilirubin, Total 0.5 0.0 - 1.2 mg/dL    ALT 52 (H) 7 - 45 U/L   POCT GLUCOSE   Result Value Ref Range    POCT Glucose 151 (H) 74 - 99 mg/dL   POCT GLUCOSE   Result Value Ref Range    POCT Glucose 158 (H) 74 - 99 mg/dL       MRI Spine 10/9  IMPRESSION:  Cervical spine:  1. Mild multilevel degenerative changes of the cervical spine most  notable for moderate central canal and bilateral neural foraminal  stenosis at C5-C6. No abnormal cord signal at this level.  2. There is a heterogeneous enhancing 5.7 cm right thyroid mass as  described above with mild compression of the adjacent trachea and  posterior displacement of surrounding vasculature. The vessels are  patent. Recommend correlation with thyroid ultrasound.      Thoracic spine:  1. No significant central canal or neural foraminal stenosis.  2. 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  Recommend correlation with outside imaging to document stability. If  none available six-month follow-up scan can be obtained for further  evaluation.      Lumbar spine:  1. Multilevel degenerative changes without high-grade central canal  or neural foraminal stenosis.  2. Small left pleural effusion with adjacent opacity, likely  atelectasis.          Assessment/Plan   Principal Problem:    Muscle weakness  Active Problems:    Acute hypoxic respiratory failure (CMS/HCC)    Diabetes mellitus, type 2 (CMS/HCC)    64 yo F w/ PMHx primary biliary cirrhosis, HTN, GERD, DM, hx of breast cancer s/p lumpectomy and radiation, and osteoarthritis presented to  [facility] on 9/25/23 for weakness and falls [facility] for further workup of acute hypoxic respiratory failure suspected s/t muscular weakness and rheumatologic/neurologic work-up.    Patient reports she first started noticing weakness following a trip to [place]. On the 23rd of September she had a fall and later on the 25th she was found down after hours of being on the ground next to her bed, without losing consciousness. She was admitted to medicine for rhabdo (Cr 1.75, CK 2880). Hospital course c/b UTI, worsening SKIP 2/2 rhabdo (CK peaked at 11.773). On the 28th developed respiratory failure and got intubated and transferred to the ICU. Patient extubated 10/3. Transferred to floor 10/4 for further work-up of weakness.     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 Got first dose on IVIG 10/6 and started solu-medrol 60 IV daily per rheum. 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 IR-guided muscle biopsy completed 10/11.    Updates 10/13:  -started atovaqone for PJP ppx  -per rheum, continue 60mg prednisone outpatient  -taper + additional tx to be determined by rheum on follow-up  -HR-CT and CT A/P completed  -extended myositis panel, HMG-CoAR Ab, T-spot and TPMT enzyme levels pending  -neuromuscular and rheum referrals sent  -plan for dishcarge to SNF tomorrow    #Proximal Muscle Weakness  #Necrotizing Myopathy  -EMG 10/6 demonstrates abnormal RUE/RLE w/ electrophysiologic evidence of a myopathy w/ fibrillation potentials consistent w/ a necrotizing myopathy  -MRI spine 10/10 most notable for T11 intraosseous hemangioma. Recommend six-month follow-up scan can be obtained for further evaluation.  -Rheumatology consulted, appreciate recs   -s/p IVIG 10/6   -Solu-medrol 60 mg IV daily  -IR-guided muscle biopsy completed 10/11  -MBS compelted 10/11, okay to progress to regular diet  "and mildly thick liquids  -started atovaqone for PJP ppx  -HR-CT and CT A/P completed  -extended myositis panel, HMG-CoAR Ab, T-spot and TPMT enzyme levels pending  -began Vit D and Ca supplementation per rheum  -daily trend for CPK, LFT's    #Tachycardia, resolved  -HR 110s-120s starting 10/4, normotensive-hypertensive  -ECG NSR  -TTE 10/5 w/ normal left ventricular systolic function, EF 65-70%. Slightly elevated RVSP  -TSH normal  -orthostats positive 10/11, bolused 500cc  -start mIVF high UOP suspected s/t diuretic phase of ATN    #Steroid-induced Hyperglycemia  #T2DM  -A1c 10/8 6.1  -hold home metformin  -mod SSI  -added NPH 5 BID 10/8  -hypoglycemia protocol    #SKIP, resolved  #Rhabdomyolysis   #HTN  -etiology of SKIP likely ATN s/t rhabdo  -CK elevated, 2k on admission -> peaked 11,773 (9/30) -> downtrending since w/ fluids  -Baseline Cr 0.7, Cr 1.75 on admission -> 2.5 -> 0.9 now  -Renal US 9/27 only significant for borderline increased cortical echogenicity  -UA (9/29): large blood, 3-5 RBCs, +protein, trace leuk est, 1+ bacteria, +casts  -urine myoglobin +   -started coreg 6.25 BID 10/6  -rae placed 10/6  -daily CK  -daily RFP    #Hx of urinary Frequency  #Urinary Rentention  -patient has bladder stimulating device placed in 2021 for \"frequent urination\"  -rae placed 10/6  -device has \"M-mode\" that is MRI safe    F: prn  E: replete prn  N: moist, minced diet with mod thickened liquids  GI ppx: PPI  DVT: SQH  Access: PIVs    Dispo: pending further work-up    Code status: full code (confirmed on floor transfer)     Lesly Parra MD      "

## 2023-10-15 LAB
NIL(NEG) CONTROL SPOT COUNT: NORMAL
PANEL A SPOT COUNT: 0
PANEL B SPOT COUNT: 0
POS CONTROL SPOT COUNT: NORMAL
T-SPOT. TB INTERPRETATION: NEGATIVE

## 2023-10-16 LAB
FENTANYL UR CFM-MCNC: <2.5 NG/ML
NORFENTANYL UR CFM-MCNC: <2.5 NG/ML

## 2023-10-17 LAB
LAB AP ASR DISCLAIMER: NORMAL
LABORATORY COMMENT REPORT: NORMAL
PATH REPORT.COMMENTS IMP SPEC: NORMAL
PATH REPORT.FINAL DX SPEC: NORMAL
PATH REPORT.GROSS SPEC: NORMAL
PATH REPORT.RELEVANT HX SPEC: NORMAL
PATH REPORT.TOTAL CANCER: NORMAL

## 2023-10-23 DIAGNOSIS — M33.20 PM (POLYMYOSITIS) (MULTI): Primary | ICD-10-CM

## 2023-10-23 NOTE — PROGRESS NOTES
Spoke with the patient, she feels she's getting better every day, now walking, still requiring a walker.   Patient is on prednisone 60 mg. Her EMP came back negative and her muscle biopsy normal.  We intend to start the patient on azathioprine as steroid sparing agent. TPMT enzyme seemed to have been canceled.   I spoke with the nurse manager at the rehab facility and it will be drawn there with CBC/CMP/CK.    She also requested seeing a rheumatologist closer to her house. Given that she lives far away.  I placed a rheum referral and she will call to schedule.

## 2023-10-27 DIAGNOSIS — M33.20 POLYMYOSITIS (MULTI): Primary | ICD-10-CM

## 2023-10-27 RX ORDER — IMMUNE GLOBULIN INFUSION (HUMAN) 100 MG/ML
INJECTION, SOLUTION INTRAVENOUS; SUBCUTANEOUS
Qty: 1600 ML | Refills: 3 | Status: SHIPPED | OUTPATIENT
Start: 2023-10-27

## 2023-10-27 NOTE — PROGRESS NOTES
Sending updated IVIG (Gammagard) infusion orders for home infusion through [facility]. Pt received 2g/kg (160 g) IV on 10/6/2023 for polymyositis. Will be due for next round of infusions around 11/6/2023. Premeds: APAP 650mg PO and benadryl 50mg PO. Dosing based on adjusted body weight - 79kg.

## 2023-11-03 ENCOUNTER — DOCUMENTATION (OUTPATIENT)
Dept: RHEUMATOLOGY | Facility: CLINIC | Age: 63
End: 2023-11-03
Payer: MEDICARE

## 2023-11-03 NOTE — PROGRESS NOTES
I have contacted the patient again couple of days ago, she informed me that now she's walking, and will be discharged home from rehab.  She informed me that they leonel blood tests. I gave her my fax number again however I still haven't received the lab results. Still awaiting TPMT enzyme before starting azathioprine as steroid sparing.  I informed her that she got scheduled to see me this upcoming Monday, she told me she was not aware of the apt. She will try to see if she can make it otherwise she will schedule an apt with a rheumatologist closer to where she lives. ( Would be outside )

## 2023-11-04 PROBLEM — R00.0 TACHYCARDIA: Status: ACTIVE | Noted: 2023-10-01

## 2023-11-04 PROBLEM — F32.A DEPRESSIVE DISORDER: Status: ACTIVE | Noted: 2023-11-04

## 2023-11-04 PROBLEM — T79.6XXA: Status: ACTIVE | Noted: 2023-11-04

## 2023-11-04 PROBLEM — R74.8 ABNORMAL LIVER ENZYMES: Status: ACTIVE | Noted: 2023-04-04

## 2023-11-04 PROBLEM — K21.9 GASTROESOPHAGEAL REFLUX DISEASE: Status: ACTIVE | Noted: 2023-11-04

## 2023-11-04 PROBLEM — E87.21 ACUTE METABOLIC ACIDOSIS: Status: ACTIVE | Noted: 2023-11-04

## 2023-11-04 PROBLEM — I10 HYPERTENSION: Status: ACTIVE | Noted: 2023-01-05

## 2023-11-04 PROBLEM — N17.9 ACUTE RENAL FAILURE (CMS-HCC): Status: ACTIVE | Noted: 2023-11-04

## 2023-11-04 PROBLEM — G62.9 NEUROPATHY: Status: ACTIVE | Noted: 2023-11-04

## 2023-11-04 PROBLEM — K58.1 IRRITABLE BOWEL SYNDROME WITH CONSTIPATION: Status: ACTIVE | Noted: 2023-11-04

## 2023-11-04 PROBLEM — E78.2 MIXED HYPERLIPIDEMIA: Status: ACTIVE | Noted: 2023-01-05

## 2023-11-04 PROBLEM — E55.9 VITAMIN D DEFICIENCY: Status: ACTIVE | Noted: 2023-08-08

## 2023-11-04 PROBLEM — F41.9 ANXIETY DISORDER: Status: ACTIVE | Noted: 2023-11-04

## 2023-11-04 PROBLEM — E07.9 MASS OF THYROID GLAND: Status: ACTIVE | Noted: 2023-11-04

## 2023-11-04 PROBLEM — M19.90 ARTHRITIS: Status: ACTIVE | Noted: 2023-11-04

## 2023-11-04 RX ORDER — TALC
250 POWDER (GRAM) TOPICAL DAILY
COMMUNITY
Start: 2020-07-22

## 2023-11-04 RX ORDER — CHLORHEXIDINE GLUCONATE ORAL RINSE 1.2 MG/ML
15 SOLUTION DENTAL 4 TIMES DAILY
COMMUNITY
Start: 2023-10-01

## 2023-11-04 RX ORDER — CETIRIZINE HYDROCHLORIDE 10 MG/1
10 TABLET ORAL EVERY MORNING
COMMUNITY
Start: 2018-11-14

## 2023-11-04 RX ORDER — UBIDECARENONE 60 MG
1 CAPSULE ORAL 2 TIMES DAILY
COMMUNITY
Start: 2020-07-22

## 2023-11-04 RX ORDER — ACETAMINOPHEN 650 MG/1
650 SUPPOSITORY RECTAL EVERY 4 HOURS PRN
COMMUNITY
Start: 2023-10-01

## 2023-11-04 RX ORDER — NIACINAMIDE 500 MG
500 TABLET ORAL EVERY OTHER DAY
COMMUNITY
Start: 2021-08-10

## 2023-11-04 RX ORDER — IBUPROFEN 100 MG/5ML
2000 SUSPENSION, ORAL (FINAL DOSE FORM) ORAL 2 TIMES DAILY
COMMUNITY
Start: 2018-11-14

## 2023-11-04 RX ORDER — DOCUSATE SODIUM 100 MG/1
100 CAPSULE, LIQUID FILLED ORAL 2 TIMES DAILY PRN
COMMUNITY
Start: 2023-10-01

## 2023-11-04 RX ORDER — HYDROXYCHLOROQUINE SULFATE 200 MG/1
1 TABLET, FILM COATED ORAL 2 TIMES DAILY
COMMUNITY
Start: 2023-05-22

## 2023-11-04 RX ORDER — LANOLIN ALCOHOL/MO/W.PET/CERES
2 CREAM (GRAM) TOPICAL EVERY MORNING
COMMUNITY
Start: 2018-11-14

## 2023-11-06 ENCOUNTER — OFFICE VISIT (OUTPATIENT)
Dept: RHEUMATOLOGY | Facility: CLINIC | Age: 63
End: 2023-11-06
Payer: MEDICARE

## 2023-11-06 VITALS
HEART RATE: 87 BPM | DIASTOLIC BLOOD PRESSURE: 75 MMHG | SYSTOLIC BLOOD PRESSURE: 151 MMHG | WEIGHT: 204 LBS | BODY MASS INDEX: 33.99 KG/M2 | HEIGHT: 65 IN

## 2023-11-06 DIAGNOSIS — M33.20 POLYMYOSITIS (MULTI): Primary | ICD-10-CM

## 2023-11-06 DIAGNOSIS — Z79.2 NEED FOR PROPHYLACTIC ANTIBIOTIC: ICD-10-CM

## 2023-11-06 PROCEDURE — 1111F DSCHRG MED/CURRENT MED MERGE: CPT | Performed by: STUDENT IN AN ORGANIZED HEALTH CARE EDUCATION/TRAINING PROGRAM

## 2023-11-06 PROCEDURE — 3078F DIAST BP <80 MM HG: CPT | Performed by: STUDENT IN AN ORGANIZED HEALTH CARE EDUCATION/TRAINING PROGRAM

## 2023-11-06 PROCEDURE — 3077F SYST BP >= 140 MM HG: CPT | Performed by: STUDENT IN AN ORGANIZED HEALTH CARE EDUCATION/TRAINING PROGRAM

## 2023-11-06 PROCEDURE — 3044F HG A1C LEVEL LT 7.0%: CPT | Performed by: STUDENT IN AN ORGANIZED HEALTH CARE EDUCATION/TRAINING PROGRAM

## 2023-11-06 PROCEDURE — 1126F AMNT PAIN NOTED NONE PRSNT: CPT | Performed by: STUDENT IN AN ORGANIZED HEALTH CARE EDUCATION/TRAINING PROGRAM

## 2023-11-06 PROCEDURE — 1159F MED LIST DOCD IN RCRD: CPT | Performed by: STUDENT IN AN ORGANIZED HEALTH CARE EDUCATION/TRAINING PROGRAM

## 2023-11-06 PROCEDURE — 99215 OFFICE O/P EST HI 40 MIN: CPT | Performed by: STUDENT IN AN ORGANIZED HEALTH CARE EDUCATION/TRAINING PROGRAM

## 2023-11-06 RX ORDER — ATOVAQUONE 750 MG/5ML
1500 SUSPENSION ORAL DAILY
Qty: 210 ML | Refills: 0 | Status: SHIPPED | OUTPATIENT
Start: 2023-11-06 | End: 2023-11-27

## 2023-11-06 RX ORDER — PREDNISONE 50 MG/1
50 TABLET ORAL DAILY
Qty: 10 TABLET | Refills: 0 | Status: SHIPPED | OUTPATIENT
Start: 2023-11-06 | End: 2023-11-16

## 2023-11-06 RX ORDER — AZATHIOPRINE 50 MG/1
50 TABLET ORAL DAILY
Qty: 30 TABLET | Refills: 5 | Status: SHIPPED | OUTPATIENT
Start: 2023-11-06 | End: 2023-11-30 | Stop reason: SDUPTHER

## 2023-11-06 ASSESSMENT — PAIN SCALES - GENERAL: PAINLEVEL: 0-NO PAIN

## 2023-11-06 NOTE — PATIENT INSTRUCTIONS
1-Decrease prednisone to 50 mg daily  2-Start Azathioprine 50 mg daily  3-Blood work in 2-3 weeks   4-Get the pneumonia and the covid vaccine whenever you can  5-We will likely resume your atovaquone liquid solution to decrease the risk of severe pneumonia  6-Follow up in 2 month at [facility]

## 2023-11-06 NOTE — PROGRESS NOTES
Subjective   Patient ID: [id number]   Bisi Mckay is a 63 y.o. female who presents for Follow-up after hospital admission for proximal muscle weakness.   HPI  Bisi Mckay is a 63 y.o. female with PMHx of primary biliary cirrhosis, HTN, GERD, DM, breast cancer, and osteoarthritis, who initially presented to [facility] on 9/25 for weakness and fall. She was then transferred to  [facility] for acute hypoxic respiratory failure and rheumatologic/neuromuscular consult.Patient was noted to have bilateral symmetric proximal weakness involving the deltoid and hip flexors, with dysphagia, and later developed acute hypoxic respiratory failure. No evident ILD changes on xray( no CT chest), no inflammatory arthritis, rashes or Raynaud's.  Her CPK on admission 2K, peaked at 11K and then down trended now to 422 with IV hydration.    IS:  -Prednisone 60 mg daily started on 10/6/2023  with atovaquone for pjp ppx  -IVIG 2 g/kg started on 10/6/2023    Muscle biopsy, EMP, Hmg Co reductase all negative   She was in rehab for the past 3 weeks, reports her strength is better with thigh muscles still weak.  No skin rash  No trouble swallowing  No Sob  No CP  Feels like she has episodes of tachycardia whenever she exerts herself   No GI sxs   No Raynaud  No dry eyes  Dry Mouth not new    Otherwise no infections, no other complaints.        Objective   Physical Exam  AO*4  Clear lungs  NlS1S2 RRR  M/P Upper proximal arms 5/5 bilaterally   Distal upper arm 5/5   Hip flexors 3+/5, 5/5 with extension, abduction and adduction   No new skin rashes  Grower sign still +     Assessment/Plan        Bisi Mckay is a 63 y.o. female with PMHx of primary biliary cirrhosis, HTN, GERD, DM, breast cancer, and osteoarthritis, who   Is here post hospital discharge for inflammatory myopathy follow up.   Patient on admission was noted to have bilateral symmetric proximal weakness involving the deltoid and hip flexors, with dysphagia, and later developed  acute hypoxic respiratory failure. No evident ILD changes on xray( no CT chest), no inflammatory arthritis, rashes or Raynaud's.Her CPK on admission 2K, peaked at 11K and then normalized with IV hydration and later starting immunosuppression.      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  TPMT enzyme normal activity 26.5 EU  Labs on 10/24 : hb 10.6, wbc 12.8,creatinin 0.59, albumin 2.8 CPK 43, LFT's WNL        IS:  -Prednisone 60 mg daily started on 10/6/2023  with atovaquone for pjp ppx  -IVIG 2 g/kg started on 10/6/2023     Plan:  -Decrease prednisone to 50 mg daily with further taper in the next 1-2 weeks  -Received IVIG 2g/Kg infusion once on 10/6/2023, given the severity of her presentation, to be redosed in a month, she will get her next dose at home  -Methotrexate is not an option given her PBC hence we will start azathioprine 50 mg daily with up titration (TPMT enzyme WNL) Risks/benefits and alternative were discussed again with the patient and she's agreeable to start treatment.   -Continue Vitamin D and calcium as bone protective measures  -PJP ppx: patient was started on atovaquone 1.5g daily during her hospital stay and on discharge. We will continue same pjp ppx( she has allergy to bactrim, and unclear why dapsone was not tried)  -Continue physical therapy  -RTC in 2 months and blood work in 3 weeks  -Patient still requires covid booster and pneumonia vaccine however she is hesitant for now given that her symptoms started after she got the flu shot       Marsha Becerra MD  Rheumatology Fellow,PGY-V    Patient seen with Dr. Lezama

## 2023-11-08 ENCOUNTER — TELEPHONE (OUTPATIENT)
Dept: RHEUMATOLOGY | Facility: CLINIC | Age: 63
End: 2023-11-08

## 2023-11-08 NOTE — TELEPHONE ENCOUNTER
Patient said her PCP needs to know the name of the infusions that she will be receiving and the dosage. Please call her at [phone].

## 2023-11-14 NOTE — TELEPHONE ENCOUNTER
Patient wants orders for bloodwork to be sent to [facility]. Fax# [phone].

## 2023-11-15 ENCOUNTER — DOCUMENTATION (OUTPATIENT)
Dept: RHEUMATOLOGY | Facility: CLINIC | Age: 63
End: 2023-11-15
Payer: MEDICARE

## 2023-11-15 NOTE — PROGRESS NOTES
Patient tolerating AZA well. IVIG still pending insurance approval.   Blood work tomorrow.  Plan to taper prednisone to 40 mg daily as of tomorrow.

## 2023-11-28 ENCOUNTER — DOCUMENTATION (OUTPATIENT)
Dept: RHEUMATOLOGY | Facility: CLINIC | Age: 63
End: 2023-11-28
Payer: MEDICARE

## 2023-11-30 DIAGNOSIS — M33.20 POLYMYOSITIS (MULTI): ICD-10-CM

## 2023-11-30 RX ORDER — AZATHIOPRINE 50 MG/1
50 TABLET ORAL 2 TIMES DAILY
Qty: 60 TABLET | Refills: 1 | Status: SHIPPED | OUTPATIENT
Start: 2023-11-30 | End: 2023-12-21 | Stop reason: SDUPTHER

## 2023-12-04 ENCOUNTER — TELEPHONE (OUTPATIENT)
Dept: RHEUMATOLOGY | Facility: CLINIC | Age: 63
End: 2023-12-04

## 2023-12-15 ENCOUNTER — DOCUMENTATION (OUTPATIENT)
Dept: RHEUMATOLOGY | Facility: CLINIC | Age: 63
End: 2023-12-15
Payer: MEDICARE

## 2023-12-18 NOTE — TELEPHONE ENCOUNTER
----- Message from Marsha Becerra MD sent at 12/15/2023 12:09 PM EST -----  Regarding: lab request  Kirill duggana!     Could you please send her the lab requisition by email?  She said she did not receive them.     Email: [email or web address]    Thanks!!    Marsha

## 2023-12-21 DIAGNOSIS — M33.20 POLYMYOSITIS (MULTI): ICD-10-CM

## 2023-12-21 RX ORDER — AZATHIOPRINE 50 MG/1
TABLET ORAL
Qty: 60 TABLET | Refills: 1 | Status: SHIPPED | OUTPATIENT
Start: 2023-12-21 | End: 2024-01-24

## 2023-12-21 RX ORDER — PREDNISONE 5 MG/1
TABLET ORAL
Qty: 60 TABLET | Refills: 0 | Status: SHIPPED | OUTPATIENT
Start: 2023-12-21

## 2023-12-21 NOTE — PROGRESS NOTES
Last IVI.   Decrease prednisone to 15 mg once daily towards the end of next week.   Increase azathioprine to 2 tablets in the morning and one table at night.   Stop taking atovaquone.

## 2024-01-08 ENCOUNTER — APPOINTMENT (OUTPATIENT)
Dept: RHEUMATOLOGY | Facility: CLINIC | Age: 64
End: 2024-01-08
Payer: MEDICARE

## 2024-01-10 ENCOUNTER — TELEPHONE (OUTPATIENT)
Dept: RHEUMATOLOGY | Facility: CLINIC | Age: 64
End: 2024-01-10

## 2024-01-10 RX ORDER — DEXLANSOPRAZOLE 60 MG/1
1 CAPSULE, DELAYED RELEASE ORAL DAILY
COMMUNITY
Start: 2023-10-31

## 2024-01-10 RX ORDER — METFORMIN HYDROCHLORIDE 500 MG/1
TABLET, EXTENDED RELEASE ORAL
COMMUNITY
Start: 2011-09-19

## 2024-01-10 NOTE — TELEPHONE ENCOUNTER
Pt called to make aware of pharmacy team working on approval and made aware of someone being in contact after received. Pt had no other concerns to address.

## 2024-01-10 NOTE — TELEPHONE ENCOUNTER
Devante called wanting to know if you received the IVIG infusion orders that they need signed for this patient. He is her nurse through [employer]. Please call him at [phone].

## 2024-01-11 ENCOUNTER — OFFICE VISIT (OUTPATIENT)
Dept: RHEUMATOLOGY | Facility: CLINIC | Age: 64
End: 2024-01-11
Payer: MEDICARE

## 2024-01-11 ENCOUNTER — LAB (OUTPATIENT)
Dept: LAB | Facility: LAB | Age: 64
End: 2024-01-11
Payer: MEDICARE

## 2024-01-11 VITALS
SYSTOLIC BLOOD PRESSURE: 127 MMHG | BODY MASS INDEX: 32.65 KG/M2 | DIASTOLIC BLOOD PRESSURE: 77 MMHG | TEMPERATURE: 97.2 F | WEIGHT: 196 LBS | HEART RATE: 77 BPM | HEIGHT: 65 IN

## 2024-01-11 DIAGNOSIS — M33.20 POLYMYOSITIS (MULTI): ICD-10-CM

## 2024-01-11 LAB
ALBUMIN SERPL BCP-MCNC: 3.9 G/DL (ref 3.4–5)
ALP SERPL-CCNC: 73 U/L (ref 33–136)
ALT SERPL W P-5'-P-CCNC: 21 U/L (ref 7–45)
ANION GAP SERPL CALC-SCNC: 14 MMOL/L (ref 10–20)
AST SERPL W P-5'-P-CCNC: 28 U/L (ref 9–39)
BILIRUB SERPL-MCNC: 0.5 MG/DL (ref 0–1.2)
BUN SERPL-MCNC: 17 MG/DL (ref 6–23)
CALCIUM SERPL-MCNC: 9.6 MG/DL (ref 8.6–10.6)
CHLORIDE SERPL-SCNC: 104 MMOL/L (ref 98–107)
CK SERPL-CCNC: 33 U/L (ref 0–215)
CO2 SERPL-SCNC: 27 MMOL/L (ref 21–32)
CREAT SERPL-MCNC: 0.67 MG/DL (ref 0.5–1.05)
CRP SERPL-MCNC: <0.1 MG/DL
EGFRCR SERPLBLD CKD-EPI 2021: >90 ML/MIN/1.73M*2
ERYTHROCYTE [DISTWIDTH] IN BLOOD BY AUTOMATED COUNT: 15.3 % (ref 11.5–14.5)
GLUCOSE SERPL-MCNC: 116 MG/DL (ref 74–99)
HCT VFR BLD AUTO: 37.9 % (ref 36–46)
HGB BLD-MCNC: 12 G/DL (ref 12–16)
MCH RBC QN AUTO: 30.7 PG (ref 26–34)
MCHC RBC AUTO-ENTMCNC: 31.7 G/DL (ref 32–36)
MCV RBC AUTO: 97 FL (ref 80–100)
NRBC BLD-RTO: 0 /100 WBCS (ref 0–0)
PLATELET # BLD AUTO: 272 X10*3/UL (ref 150–450)
POTASSIUM SERPL-SCNC: 4.5 MMOL/L (ref 3.5–5.3)
PROT SERPL-MCNC: 6.6 G/DL (ref 6.4–8.2)
RBC # BLD AUTO: 3.91 X10*6/UL (ref 4–5.2)
SODIUM SERPL-SCNC: 140 MMOL/L (ref 136–145)
WBC # BLD AUTO: 12.6 X10*3/UL (ref 4.4–11.3)

## 2024-01-11 PROCEDURE — 36415 COLL VENOUS BLD VENIPUNCTURE: CPT

## 2024-01-11 PROCEDURE — 82550 ASSAY OF CK (CPK): CPT

## 2024-01-11 PROCEDURE — 1036F TOBACCO NON-USER: CPT | Performed by: STUDENT IN AN ORGANIZED HEALTH CARE EDUCATION/TRAINING PROGRAM

## 2024-01-11 PROCEDURE — 1159F MED LIST DOCD IN RCRD: CPT | Performed by: STUDENT IN AN ORGANIZED HEALTH CARE EDUCATION/TRAINING PROGRAM

## 2024-01-11 PROCEDURE — 99214 OFFICE O/P EST MOD 30 MIN: CPT | Mod: GC | Performed by: STUDENT IN AN ORGANIZED HEALTH CARE EDUCATION/TRAINING PROGRAM

## 2024-01-11 PROCEDURE — 1126F AMNT PAIN NOTED NONE PRSNT: CPT | Performed by: STUDENT IN AN ORGANIZED HEALTH CARE EDUCATION/TRAINING PROGRAM

## 2024-01-11 PROCEDURE — 3078F DIAST BP <80 MM HG: CPT | Performed by: STUDENT IN AN ORGANIZED HEALTH CARE EDUCATION/TRAINING PROGRAM

## 2024-01-11 PROCEDURE — 85027 COMPLETE CBC AUTOMATED: CPT

## 2024-01-11 PROCEDURE — 80053 COMPREHEN METABOLIC PANEL: CPT

## 2024-01-11 PROCEDURE — 3074F SYST BP LT 130 MM HG: CPT | Performed by: STUDENT IN AN ORGANIZED HEALTH CARE EDUCATION/TRAINING PROGRAM

## 2024-01-11 PROCEDURE — 86140 C-REACTIVE PROTEIN: CPT

## 2024-01-11 PROCEDURE — 99214 OFFICE O/P EST MOD 30 MIN: CPT | Performed by: STUDENT IN AN ORGANIZED HEALTH CARE EDUCATION/TRAINING PROGRAM

## 2024-01-11 RX ORDER — CARVEDILOL 6.25 MG/1
TABLET ORAL
COMMUNITY
Start: 2024-01-01

## 2024-01-11 RX ORDER — ASPIRIN 325 MG
200 TABLET, DELAYED RELEASE (ENTERIC COATED) ORAL DAILY
COMMUNITY

## 2024-01-11 RX ORDER — FERROUS SULFATE 325(65) MG
325 TABLET ORAL
COMMUNITY

## 2024-01-11 RX ORDER — SPIRONOLACTONE 50 MG/1
50 TABLET, FILM COATED ORAL DAILY
COMMUNITY
Start: 2023-12-24

## 2024-01-11 RX ORDER — MULTIVITAMIN/IRON/FOLIC ACID 18MG-0.4MG
1 TABLET ORAL DAILY
COMMUNITY

## 2024-01-11 ASSESSMENT — PAIN SCALES - GENERAL: PAINLEVEL: 0-NO PAIN

## 2024-01-11 NOTE — PROGRESS NOTES
Subjective   Patient ID: [id number]   Bisi Mckay is a 64 y.o. female who presents for No chief complaint on file. after hospital admission for proximal muscle weakness.   HPI  Bisi Mckay is a 64 y.o. female with PMHx of primary biliary cirrhosis, HTN, GERD, DM, breast cancer, and osteoarthritis, who initially presented to [facility] on 9/25 for weakness and fall. She was then transferred to  [facility] for acute hypoxic respiratory failure and rheumatologic/neuromuscular consult.Patient was noted to have bilateral symmetric proximal weakness involving the deltoid and hip flexors, with dysphagia, and later developed acute hypoxic respiratory failure. No evident ILD changes on xray( no CT chest), no inflammatory arthritis, rashes or Raynaud's.  Her CPK on admission 2K, peaked at 11K and then down trended now to 422 with IV hydration.    IS:  -Prednisone 60 mg daily started on 10/6/2023  with atovaquone for pjp ppx--> now at 15 mg daily   -IVIG 2 g/kg started on 10/6/2023  -Azathioprine 150 mg daily     Stopped doing rehab, and PT. Reports feeling better than she used to long time ago.   Residual sxs: Tired in the thighs when she's active.  No infections.   No skin rash  No trouble swallowing  No SOB  No CP  Feels like she has episodes of tachycardia whenever she exerts herself--> resolved   No GI sxs   No Raynaud  No dry eyes  Dry Mouth not new  No fever  No weight changes   No joint pain    Objective   Physical Exam  AO*4  Clear lungs  NlS1S2 RRR  M/P Upper proximal arms 5/5 bilaterally   Distal upper arm 5/5   Hip flexors , extension, abduction and adduction 5/5  No new skin rashes  Grower negative    Assessment/Plan    Bisi Mckay is a 64 y.o. female with PMHx of primary biliary cirrhosis, HTN, GERD, DM, breast cancer, and osteoarthritis, who is here for inflammatory myopathy follow up.   Patient on admission was noted to have bilateral symmetric proximal weakness involving the deltoid and hip flexors, with  dysphagia, and later developed acute hypoxic respiratory failure. No evident ILD changes on xray( no CT chest), no inflammatory arthritis, rashes or Raynaud's.Her CPK on admission 2K, peaked at 11K and then normalized with IV hydration and later starting immunosuppression.      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  TPMT enzyme normal activity 26.5 EU  Labs on 10/24 : hb 10.6, wbc 12.8,creatinin 0.59, albumin 2.8 CPK 43, LFT's WNL       IS:  -Prednisone 60 mg daily started on 10/6/2023  with atovaquone for pjp ppx--> now at 15 mg daily   -IVIG 2 g/kg started on 10/6/2023  -Azathioprine 150 mg daily        #polymyositis, now in remission   -Decrease prednisone to 10 mg if labs okay, with additional taper in few weeks  -Received IVIG 2g/Kg infusion once on 10/6/2023, continue IVIG for now  -Methotrexate is not an option given her PBC hence was started azathioprine 50 mg daily with up titration to 150 mg daily (TPMT enzyme WNL)   -Continue Vitamin D and calcium as bone protective measures  -PJP ppx discontinued recently  -Continue physical therapy as needed  -RTC in 4 months   -Patient still requires covid booster and pneumonia vaccine however she is hesitant for now given that her symptoms started after she got the flu shot       Marsha Bceerra MD  Rheumatology Fellow,PGY-V    Patient seen with Dr. Duque

## 2024-01-11 NOTE — TELEPHONE ENCOUNTER
Sabine from [facility] is following up on a verbal order request that was supposed to be signed and sent back... any questions please contact [phone] ext. [phone]

## 2024-01-24 DIAGNOSIS — M33.20 POLYMYOSITIS (MULTI): ICD-10-CM

## 2024-01-24 RX ORDER — AZATHIOPRINE 50 MG/1
TABLET ORAL
Qty: 120 TABLET | Refills: 1 | Status: SHIPPED | OUTPATIENT
Start: 2024-01-24 | End: 2024-05-09 | Stop reason: SDUPTHER

## 2024-02-08 NOTE — TELEPHONE ENCOUNTER
Sabine from [facility] called today and indicated that they have been faxing home health orders for medications and have gotten zero  response. She states this has been faxed 11 times with no response. Needs signature. She faxed to [phone] and [phone].   She will refax today to [phone] for signature and to [phone].   Please call Sabine at [phone] ext. [phone]

## 2024-02-09 ENCOUNTER — DOCUMENTATION (OUTPATIENT)
Dept: RHEUMATOLOGY | Facility: CLINIC | Age: 64
End: 2024-02-09
Payer: MEDICARE

## 2024-02-13 ENCOUNTER — TELEPHONE (OUTPATIENT)
Dept: RHEUMATOLOGY | Facility: CLINIC | Age: 64
End: 2024-02-13

## 2024-02-13 NOTE — TELEPHONE ENCOUNTER
Sabine from [facility] called again and indicated that they still have not gotten back via fax the verbal orders signed for November and December. They need this to bill. They state it has been faxed here multiple times. Please call Sabine at [phone]. Tracking#'s on verbal orders are [id number] and [id number].

## 2024-03-27 DIAGNOSIS — M33.20 POLYMYOSITIS (MULTI): Primary | ICD-10-CM

## 2024-05-09 ENCOUNTER — TELEPHONE (OUTPATIENT)
Dept: RHEUMATOLOGY | Facility: CLINIC | Age: 64
End: 2024-05-09
Payer: MEDICARE

## 2024-05-09 DIAGNOSIS — M33.20 POLYMYOSITIS (MULTI): ICD-10-CM

## 2024-05-09 RX ORDER — AZATHIOPRINE 50 MG/1
TABLET ORAL
Qty: 120 TABLET | Refills: 1 | Status: SHIPPED | OUTPATIENT
Start: 2024-05-09

## 2024-05-09 RX ORDER — AZATHIOPRINE 50 MG/1
TABLET ORAL
Qty: 120 TABLET | Refills: 1 | Status: CANCELLED | OUTPATIENT
Start: 2024-05-09

## 2024-05-10 NOTE — TELEPHONE ENCOUNTER
Rx sent. Please let patient know that Dr. Letty Becerra has ordered labs for this patient to get done for monitoring. Last labs were done in January.     Thanks!

## 2024-05-10 NOTE — TELEPHONE ENCOUNTER
"Call placed to patient to make aware of refill request being sent to preferred pharmacy. Unable to establish direct contact due to calls being screened by \"Smart Screen\" and vm left. This nurse also communicated that new orders for labs were placed and that she needed to go and have drawn at any  lab for immediate results to be sent to provider.   "

## 2024-05-14 ENCOUNTER — APPOINTMENT (OUTPATIENT)
Dept: RHEUMATOLOGY | Facility: CLINIC | Age: 64
End: 2024-05-14
Payer: MEDICARE

## 2024-05-15 ENCOUNTER — HOSPITAL ENCOUNTER (OUTPATIENT)
Dept: RADIOLOGY | Facility: CLINIC | Age: 64
Discharge: HOME | End: 2024-05-15
Payer: MEDICARE

## 2024-05-15 ENCOUNTER — OFFICE VISIT (OUTPATIENT)
Dept: RHEUMATOLOGY | Facility: CLINIC | Age: 64
End: 2024-05-15
Payer: MEDICARE

## 2024-05-15 VITALS
HEART RATE: 71 BPM | SYSTOLIC BLOOD PRESSURE: 115 MMHG | HEIGHT: 65 IN | DIASTOLIC BLOOD PRESSURE: 70 MMHG | TEMPERATURE: 96.8 F | WEIGHT: 197 LBS | BODY MASS INDEX: 32.82 KG/M2

## 2024-05-15 DIAGNOSIS — M33.20 POLYMYOSITIS (MULTI): ICD-10-CM

## 2024-05-15 DIAGNOSIS — M33.20 POLYMYOSITIS (MULTI): Primary | ICD-10-CM

## 2024-05-15 PROCEDURE — 72110 X-RAY EXAM L-2 SPINE 4/>VWS: CPT

## 2024-05-15 PROCEDURE — 1157F ADVNC CARE PLAN IN RCRD: CPT | Performed by: STUDENT IN AN ORGANIZED HEALTH CARE EDUCATION/TRAINING PROGRAM

## 2024-05-15 PROCEDURE — 99214 OFFICE O/P EST MOD 30 MIN: CPT | Mod: GC | Performed by: STUDENT IN AN ORGANIZED HEALTH CARE EDUCATION/TRAINING PROGRAM

## 2024-05-15 PROCEDURE — 72110 X-RAY EXAM L-2 SPINE 4/>VWS: CPT | Performed by: STUDENT IN AN ORGANIZED HEALTH CARE EDUCATION/TRAINING PROGRAM

## 2024-05-15 PROCEDURE — 1159F MED LIST DOCD IN RCRD: CPT | Performed by: STUDENT IN AN ORGANIZED HEALTH CARE EDUCATION/TRAINING PROGRAM

## 2024-05-15 PROCEDURE — 3078F DIAST BP <80 MM HG: CPT | Performed by: STUDENT IN AN ORGANIZED HEALTH CARE EDUCATION/TRAINING PROGRAM

## 2024-05-15 PROCEDURE — 3074F SYST BP LT 130 MM HG: CPT | Performed by: STUDENT IN AN ORGANIZED HEALTH CARE EDUCATION/TRAINING PROGRAM

## 2024-05-15 PROCEDURE — 1125F AMNT PAIN NOTED PAIN PRSNT: CPT | Performed by: STUDENT IN AN ORGANIZED HEALTH CARE EDUCATION/TRAINING PROGRAM

## 2024-05-15 PROCEDURE — 72100 X-RAY EXAM L-S SPINE 2/3 VWS: CPT

## 2024-05-15 PROCEDURE — 99214 OFFICE O/P EST MOD 30 MIN: CPT | Performed by: STUDENT IN AN ORGANIZED HEALTH CARE EDUCATION/TRAINING PROGRAM

## 2024-05-15 ASSESSMENT — PAIN SCALES - GENERAL: PAINLEVEL: 8

## 2024-05-15 NOTE — PROGRESS NOTES
I saw and evaluated the patient. I personally obtained the key and critical portions of the history and physical exam or was physically present for key and critical portions performed by the trainee. I reviewed the trainee's documentation and discussed the patient with the trainee. I agree with the trainee's medical decision making, as documented on the trainee's note.     Agustin Tabor MD

## 2024-05-15 NOTE — PROGRESS NOTES
Subjective   Patient ID: [id number]   Bisi Mckay is a 64 y.o. female who presents for Follow-up after hospital admission for proximal muscle weakness.   HPI  Bisi Mckay is a 64 y.o. female with PMHx of primary biliary cirrhosis, HTN, GERD, DM, breast cancer, and osteoarthritis, hx of gastric bypass, who [facility] for weakness and fall. She was then transferred to  [facility] for acute hypoxic respiratory failure and rheumatologic/neuromuscular consult.Patient was noted to have bilateral symmetric proximal weakness involving the deltoid and hip flexors, with dysphagia, and later developed acute hypoxic respiratory failure. No evident ILD changes on xray( no CT chest), no inflammatory arthritis, rashes or Raynaud's.  Her CPK on admission 2K, peaked at 11K and then down trended now to 422 with IV hydration.    IS:  -Prednisone 60 mg daily started on 10/6/2023  with atovaquone for pjp ppx--> now off steroids around 3 months  -IVIG 2 g/kg started on 10/6/2023--> last infusion March 24-25, total of 5 infusions   -Azathioprine 150 mg daily     Interval hx:  Stopped doing PT. Doesn't have weakness.   Reports being in pain since stopping prednisone. Pain in the hands, neck, back, knees, ankle, feet and feels tired.   Morning stiffness 15 mins. No pain at rest, mostly when she's active.  Doesn't wake up at night.   No Skin rashes. No trouble swallowing. No SOB      Objective   Physical Exam  AO*4  Clear lungs  NlS1S2 RRR  M/P Upper proximal arms 5/5 bilaterally   Distal upper arm 5/5   Hip flexors , extension, abduction and adduction 5/5  No new skin rashes  Grower negative    Assessment/Plan    Bisi Mckay is a 64 y.o. female with PMHx of primary biliary cirrhosis, HTN, GERD, DM, breast cancer, and osteoarthritis, who is here for inflammatory myopathy follow up.   Patient on admission was noted to have bilateral symmetric proximal weakness involving the deltoid and hip flexors, with  dysphagia, and later developed acute hypoxic respiratory failure. No evident ILD changes on xray( no CT chest), no inflammatory arthritis, rashes or Raynaud's.Her CPK on admission 2K, peaked at 11K and then normalized with IV hydration and later starting immunosuppression.      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  TPMT enzyme normal activity 26.5 EU  Labs on 10/24 : hb 10.6, wbc 12.8,creatinin 0.59, albumin 2.8 CPK 43, LFT's WNL         #polymyositis, now in remission   -Off steroids and IVIG  -Methotrexate is not an option given her PBC hence was started azathioprine 50 mg daily with up titration to 150 mg daily (TPMT enzyme WNL)   -Given symptoms of fatigue and achiness we will check Cortisol AM, rule ou AI and CPK/CBC/CMP/CRP/TSH  -Xray L Spine for back pain/sciatica   -Continue physical therapy as needed  -RTC in 4 months   -Patient still requires covid booster and pneumonia vaccine however she is hesitant for now given that her symptoms started after she got the flu shot       Marsha Becerra MD  Rheumatology Fellow,PGY-V    Patient seen with Dr. Tabor

## 2024-05-23 DIAGNOSIS — R73.9 HYPERGLYCEMIA: ICD-10-CM

## 2024-05-23 DIAGNOSIS — M33.20 POLYMYOSITIS (MULTI): Primary | ICD-10-CM

## 2024-05-23 NOTE — PROGRESS NOTES
Labs WNL.Alk phos mildly elevated, will monitor with repeat labs in 1-2 months.   Patient feels better today.

## 2024-07-29 ENCOUNTER — TELEPHONE (OUTPATIENT)
Dept: RHEUMATOLOGY | Facility: CLINIC | Age: 64
End: 2024-07-29
Payer: MEDICARE

## 2024-07-29 DIAGNOSIS — M33.20 POLYMYOSITIS (MULTI): ICD-10-CM

## 2024-07-29 RX ORDER — AZATHIOPRINE 50 MG/1
TABLET ORAL
Qty: 120 TABLET | Refills: 1 | Status: SHIPPED | OUTPATIENT
Start: 2024-07-29

## 2024-07-29 NOTE — TELEPHONE ENCOUNTER
Patient would like a refill on Rusodiol 500mg, Azathioprine 50 mg. Brianna Stearns [phone]  Patient needs blood work done before her appt on Sept 27. Thank you

## 2024-09-16 NOTE — PROGRESS NOTES
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 She was then transferred to  [facility] for acute hypoxic respiratory failure and rheumatologic/neuromuscular consult.  Patient was noted to have bilateral symmetric proximal weakness involving the deltoid and hip flexors, with dysphagia, and later developed acute hypoxic respiratory failure. No evident ILD changes on xray( no CT chest), no inflammatory arthritis, rashes or Raynaud's.  Her CPK on admission 2K, peaked at 11K and then down trended now to 422 with IV hydration.    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    Subjective   Patient ID: [id number]   Bisi Mckay is a 64 y.o. female who presents for follow-up of inflammatory myopathy.    Current IS:  -Azathioprine 150 mg daily     Prior IS:  -Prednisone 60 mg daily 10/6/2023-2/2024 with atovaquone for pjp ppx  -IVIG 2 g/kg started on 10/6/2023-3/2024 total of 5 infusions     Interval hx 9/2024:  Last visit 5/2024  Reports previous generalized aches and pains have resolved  Having tailbone pain since June 29th- thinks related to her stimulator for bladder  Feels like muscle strength stable  Has been exercising more with weights and elliptical at  [employer], though not since July 2024 after noticed some limited ROM of shoulders since July 2024 [place] visit  No muscle weakness  No issues rising from chair without support  Morning stiffness 15 mins. No pain at rest, mostly when she's active.  Doesn't wake up at night.   Some dermatitis on chin per derm, on abx from derm, otherwise no rash. No trouble swallowing. No SOB    At 5/2024 visit, given symptoms of fatigue and achiness, Cortisol AM, CPK/CBC/CMP/CRP/TSH were ordered. Labs done at [facility] 5/2024, reviewed and wnl    9/2024 [facility] lab results, personally reviewed  CK normal 32  CMP normal AST 20, mildly decreased ALT 11 (normal 13), ALP slightly elevated, otherwise wnl  CBC normal    Xray L Spine for back pain/sciatica was also ordered 5/2024 visit- showed mild to moderate multilevel spondylosis, most pronounced at L5-S1.     Patient Active Problem List   Diagnosis    Acute hypoxic respiratory failure (Multi)    Muscle weakness    Diabetes mellitus, type 2 (Multi)    Abnormal liver enzymes    Acute metabolic acidosis    Acute renal failure (CMS-HCC)    Anxiety disorder    Arthritis    Irritable bowel syndrome with constipation    Depressive disorder    Gastroesophageal reflux disease    Hypertension    Mass of thyroid gland    Neuropathy    Traumatic ischemia of muscle (CMS-HCC)    Mixed hyperlipidemia    Tachycardia    Vitamin D deficiency        Past Medical History:   Diagnosis Date    Abnormal liver enzymes     Arthritis     Breast cancer (Multi)     Constipation     Diabetes mellitus, type 2 (Multi)     GERD (gastroesophageal reflux disease)     Hypertension     Irritable bowel syndrome     Muscle weakness     Neuropathy         Past Surgical History:   Procedure Laterality Date    ABDOMINAL ADHESION SURGERY  09/24/2014    APPENDECTOMY  09/24/2014    BLADDER SUSPENSION  04/01/2007    BREAST LUMPECTOMY  06/2012    CARPAL TUNNEL RELEASE N/A 2016    CHOLECYSTECTOMY  11/01/2012     HYSTERECTOMY  04/01/2007        Social History     Socioeconomic History    Marital status: Unknown     Spouse name: Not on file    Number of children: Not on file    Years of education: Not on file    Highest education level: Not on file   Occupational History    Not on file   Tobacco Use    Smoking status: Never    Smokeless tobacco: Never   Substance and Sexual Activity    Alcohol use: Never     Comment: occasional    Drug use: Not on file    Sexual activity: Not on file   Other Topics Concern    Not on file   Social History Narrative    Not on file     Social Determinants of Health     Financial Resource Strain: Low Risk  (10/1/2023)    Overall Financial Resource Strain (CARDIA)     Difficulty of Paying Living Expenses: Not hard at all   Food Insecurity: No Food Insecurity (10/2/2023)    Hunger Vital Sign     Worried About Running Out of Food in the Last Year: Never true     Ran Out of Food in the Last Year: Never true   Transportation Needs: No Transportation Needs (10/2/2023)    PRAPARE - Transportation     Lack of Transportation (Medical): No     Lack of Transportation (Non-Medical): No   Physical Activity: Not on file   Stress: No Stress Concern Present (10/1/2023)    [employer] [place] [employer] - Occupational Stress Questionnaire     Feeling of Stress : Not at all   Social Connections: Unknown (10/1/2023)    Social Connection and Isolation Panel [NHANES]     Frequency of Communication with Friends and Family: Once a week     Frequency of Social Gatherings with Friends and Family: Once a week     Attends Buddhist Services: Never     Active Member of Clubs or Organizations: No     Attends Club or Organization Meetings: 1 to 4 times per year     Marital Status: Patient declined   Intimate Partner Violence: Not At Risk (10/2/2023)    Humiliation, Afraid, Rape, and Kick questionnaire     Fear of Current or Ex-Partner: No     Emotionally Abused: No     Physically Abused: No     Sexually Abused: No    Housing Stability: Low Risk  (10/2/2023)    Housing Stability Vital Sign     Unable to Pay for Housing in the Last Year: No     Number of Places Lived in the Last Year: 1     Unstable Housing in the Last Year: No        Allergies   Allergen Reactions    Bactrim [Sulfamethoxazole-Trimethoprim] Other, Fever and Chills     Chills, fever, flu-like symptoms - Per [facility] medical record          Current Outpatient Medications:     calcium carbonate/vitamin D3 (CALCIUM 600 + D,3, ORAL), Take 1 capsule by mouth once daily in the morning., Disp: , Rfl:     carvedilol (Coreg) 6.25 mg tablet, , Disp: , Rfl:     cetirizine (ZyrTEC) 10 mg tablet, Take 1 tablet (10 mg) by mouth once daily in the morning., Disp: , Rfl:     chlorhexidine (Peridex) 0.12 % solution, Use 15 mL in the mouth or throat 4 times a day., Disp: , Rfl:     cholecalciferol, vitD3,/vit K2 (VITAMIN D3-VITAMIN K2 ORAL), Take 5,000 Units by mouth., Disp: , Rfl:     co-enzyme Q-10 50 mg capsule, Take 4 capsules (200 mg) by mouth once daily., Disp: , Rfl:     cyanocobalamin (Vitamin B-12) 1,000 mcg tablet, Take 2 tablets (2,000 mcg) by mouth once daily in the morning., Disp: , Rfl:     dexlansoprazole (Dexilant) 60 mg DR capsule, Take 1 capsule (60 mg) by mouth once daily., Disp: , Rfl:     ferrous sulfate, dried 160 mg (50 mg iron) ER tablet, Take 160 mg by mouth. Mon/Wed/Fri,, Disp: , Rfl:     gabapentin (Neurontin) 300 mg capsule, Take 1 capsule (300 mg) by mouth every morning & take 3 capsules (900 mg) by mouth every night at bedtime., Disp: , Rfl:     HAIR, SKIN AND NAILS, BIOTIN, ORAL, Take by mouth., Disp: , Rfl:     magnesium oxide (Mag-Ox) 250 mg magnesium tablet, Take 1 tablet (250 mg) by mouth once daily., Disp: , Rfl:     metFORMIN  mg 24 hr tablet, once daily., Disp: , Rfl:     MILK THISTLE ORAL, Milk Thistle oral capsule 0 Refill(s) Start Date: 8/10/21 Status: Ordered, Disp: , Rfl:     mineral oil/petrolatum,white (PURALUBE OPHT),  Administer 1 Application into both eyes every 8 hours if needed., Disp: , Rfl:     niacinamide 500 mg tablet, Take 1 tablet (500 mg) by mouth every other day., Disp: , Rfl:     plecanatide (Trulance) tablet tablet, Take 1 tablet (3 mg) by mouth once daily., Disp: , Rfl:     spironolactone (Aldactone) 50 mg tablet, Take 1 tablet (50 mg) by mouth once daily., Disp: , Rfl:     ubidecarenone (coenzyme Q-10) 60 mg capsule, Take 1 capsule (60 mg) by mouth 2 times a day., Disp: , Rfl:     ursodiol (Actigall) 500 mg tablet, Take 2 tablets (1,000 mg) by mouth every morning & take 1 tablet (500 mg) by mouth every night at bedtime., Disp: , Rfl:     white petrolatum-mineral oiL 94-3 % ophthalmic ointment, Apply 1 Application to both eyes every 8 hours. 0.15 MG -0.85 MG, Disp: , Rfl:     ZINC ORAL, Take 140 mg by mouth. Tuesday and Saturday, Disp: , Rfl:     azaTHIOprine (Imuran) 50 mg tablet, take 2 tablets by mouth every morning and 1 tablet every NIGHT, Disp: 270 tablet, Rfl: 1    b complex 0.4 mg tablet, Take 1 tablet by mouth once daily., Disp: , Rfl:     cevimeline (Evoxac) 30 mg capsule, Take 1 capsule (30 mg) by mouth 2 times a day., Disp: , Rfl:     hyoscyamine (Anaspaz, Levsin) 0.125 mg tablet, Take 1 tablet (0.125 mg) by mouth 2 times a day., Disp: , Rfl:     PNV no.95/ferrous fum/folic ac (PRENATAL ORAL), Take 1 tablet by mouth once daily in the morning., Disp: , Rfl:      Objective   Vitals:    09/17/24 1018   BP: 104/65   Pulse: 74   Temp: 36.4 °C (97.6 °F)        Physical Exam  General - NAD, sitting up in chair, well-groomed, pleasant, AAOx3  Head: Normocephalic, atraumatic  Eyes - PERRLA, EOMI. No conjunctiva injection.   Mouth/ENT - Moist oral and nasal mucosa. No facial rash. No enlarged parotid or submandibular gland. Adequate salivary pooling.  Cardiovascular - Normal S1, S2. Regular rate and rhythm. No murmurs or rubs.  Lungs - Symmetric chest expansion. Clear to auscultation bilaterally.   Skin - No  rashes or ulcers. Skin warm and dry. No erythema on bilateral cheeks.  Extremities - No edema, cyanosis ,or clubbing  Neurological - Alert and oriented x 3,  grossly intact. No focal deficit. 5/5 motor strength bilaterally in upper and lower extremities    Musculoskeletal -  Shoulders: Limited active flexion, without pain, no swelling, warmth or tenderness.  Elbows: Full ROM, without pain, no swelling, warmth or tenderness.  Wrists: Full ROM, without pain, no swelling, warmth or tenderness.  MCP: No swelling, warmth or tenderness. Metacarpal squeeze negative  PIP: No swelling, warmth or tenderness.  DIP: No swelling, warmth or tenderness.  Hands : 5/5.    Sacroiliac joints: No local tenderness. MEG negative.   Hips: Full ROM.  No malalignment.  Knees:  Full ROM, without pain, no swelling, warmth or point tenderness. No joint line tenderness, no pes anserine tenderness.  Ankles: Full ROM, without pain, swelling, warmth or tenderness.  Cervical spine: No tenderness or limitation of movement  Lumbar spine: No tenderness or limitation of movement     Lab Results   Component Value Date    WBC 12.6 (H) 01/11/2024    HGB 12.0 01/11/2024    HCT 37.9 01/11/2024    MCV 97 01/11/2024     01/11/2024        Chemistry    Lab Results   Component Value Date/Time     01/11/2024 1431    K 4.5 01/11/2024 1431     01/11/2024 1431    CO2 27 01/11/2024 1431    BUN 17 01/11/2024 1431    CREATININE 0.67 01/11/2024 1431    Lab Results   Component Value Date/Time    CALCIUM 9.6 01/11/2024 1431    ALKPHOS 73 01/11/2024 1431    AST 28 01/11/2024 1431    ALT 21 01/11/2024 1431    BILITOT 0.5 01/11/2024 1431           Lab Results   Component Value Date    CRP <0.10 01/11/2024      Lab Results   Component Value Date    SEDRATE 24 10/04/2023      Lab Results   Component Value Date    HEPBCAB Nonreactive 10/05/2023    HEPCAB Nonreactive 10/05/2023      Lab Results   Component Value Date    ALT 21 01/11/2024    AST 28  01/11/2024    ALKPHOS 73 01/11/2024    BILITOT 0.5 01/11/2024     XR lumbar spine complete 4+ views  Narrative: Interpreted By:  Petey Stubbs,   STUDY:  XR LUMBAR SPINE COMPLETE 4+ VIEWS; ;  5/15/2024 11:48 am      INDICATION:  Signs/Symptoms:rule out DJD.      COMPARISON:  None.      ACCESSION NUMBER(S):  [id number]      ORDERING CLINICIAN:  ARAINNE FRIEND      FINDINGS:  Five views of the lumbar spine.      Sacral spinal stimulator. No acute fracture. No focal subluxation.  Mild multilevel discogenic degenerative change and facet hypertrophy.  There is more focal moderate L5-S1 facet hypertrophy. The soft  tissues are unremarkable. No spondylolysis.      Impression: Mild to moderate multilevel spondylosis, most pronounced at L5-S1.      MACRO:  None      Signed by: Petey Stubbs 5/18/2024 9:04 AM  Dictation workstation:   [id number]     === 05/15/24 ===    XR LUMBAR SPINE COMPLETE 4+ VIEWS    - Impression -  Mild to moderate multilevel spondylosis, most pronounced at L5-S1.    MACRO:  None    Signed by: Petey Stubbs 5/18/2024 9:04 AM  Dictation workstation:   [id number]   === 10/01/23 ===    MR LUMBAR SPINE W AND WO CONTRAST    - Impression -  Cervical spine:  1. Mild multilevel degenerative changes of the cervical spine most  notable for moderate central canal and bilateral neural foraminal  stenosis at C5-C6. No abnormal cord signal at this level.  2. There is a heterogeneous enhancing 5.7 cm right thyroid mass as  described above with mild compression of the adjacent trachea and  posterior displacement of surrounding vasculature. The vessels are  patent. Recommend correlation with thyroid ultrasound.    Thoracic spine:  1. No significant central canal or neural foraminal stenosis.  2. 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  Recommend correlation with  outside imaging to document stability. If  none available six-month follow-up scan can be obtained for further  evaluation.    Lumbar spine:  1. Multilevel degenerative changes without high-grade central canal  or neural foraminal stenosis.  2. Small left pleural effusion with adjacent opacity, likely  atelectasis.    I personally reviewed the images/study and I agree with the findings  as stated. This study was interpreted at [facility], [place], [place].    MACRO:  None    Signed by: Wiliam Zurita 10/10/2023 12:42 AM  Dictation workstation:   [id number]     Assessment/Plan    Bisi Mckay is a 64 y.o. female with PMHx of primary biliary cirrhosis, HTN, GERD, DM, breast cancer, and osteoarthritis, who is here for inflammatory myopathy follow up.     Inflammatory myopathy, now in remission   Off steroids and IVIG  Methotrexate is not an option given her PBC hence was started azathioprine 50 mg daily with up titration to 150 mg daily (TPMT enzyme WNL)   Previous symptoms of fatigue and myalgias have resolved and Cortisol AM/CPK/CBC/CMP/CRP/TSH were wnl 5/2024 9/2024 labs reviewed, at goal, normal CPK and AST/ALT    Continue azathioprine 150 mg daily, refilled today  Update CBC, CMP, ESR, CRP, CPK prior to next visit    Health Maintenance  Last DEXA not available, patient reports many years ago  DEXA ordered today  Patient still requires covid booster and pneumonia vaccine however she is hesitant for now given that her symptoms started after she got the flu shot     RTC in 4 months     Patient seen and discussed with Dr. Calderon.    Jennifer Santoro MD  Rheumatology Fellow, PGY-4     Orders Placed This Encounter   Procedures    XR DEXA bone density    CBC and Auto Differential    Comprehensive Metabolic Panel    Sedimentation Rate    C-Reactive Protein    Creatine Kinase

## 2024-09-17 ENCOUNTER — APPOINTMENT (OUTPATIENT)
Dept: RHEUMATOLOGY | Facility: CLINIC | Age: 64
End: 2024-09-17
Payer: MEDICARE

## 2024-09-17 VITALS
WEIGHT: 199.8 LBS | TEMPERATURE: 97.6 F | BODY MASS INDEX: 33.29 KG/M2 | HEIGHT: 65 IN | SYSTOLIC BLOOD PRESSURE: 104 MMHG | HEART RATE: 74 BPM | DIASTOLIC BLOOD PRESSURE: 65 MMHG

## 2024-09-17 DIAGNOSIS — M33.20 POLYMYOSITIS: ICD-10-CM

## 2024-09-17 DIAGNOSIS — Z78.0 ENCOUNTER FOR OSTEOPOROSIS SCREENING IN ASYMPTOMATIC POSTMENOPAUSAL PATIENT: ICD-10-CM

## 2024-09-17 DIAGNOSIS — G72.49 INFLAMMATORY MYOPATHY: Primary | ICD-10-CM

## 2024-09-17 DIAGNOSIS — Z13.820 ENCOUNTER FOR OSTEOPOROSIS SCREENING IN ASYMPTOMATIC POSTMENOPAUSAL PATIENT: ICD-10-CM

## 2024-09-17 PROCEDURE — 1036F TOBACCO NON-USER: CPT | Performed by: STUDENT IN AN ORGANIZED HEALTH CARE EDUCATION/TRAINING PROGRAM

## 2024-09-17 PROCEDURE — 1157F ADVNC CARE PLAN IN RCRD: CPT | Performed by: STUDENT IN AN ORGANIZED HEALTH CARE EDUCATION/TRAINING PROGRAM

## 2024-09-17 PROCEDURE — 99214 OFFICE O/P EST MOD 30 MIN: CPT | Performed by: STUDENT IN AN ORGANIZED HEALTH CARE EDUCATION/TRAINING PROGRAM

## 2024-09-17 PROCEDURE — 1159F MED LIST DOCD IN RCRD: CPT | Performed by: STUDENT IN AN ORGANIZED HEALTH CARE EDUCATION/TRAINING PROGRAM

## 2024-09-17 PROCEDURE — 1125F AMNT PAIN NOTED PAIN PRSNT: CPT | Performed by: STUDENT IN AN ORGANIZED HEALTH CARE EDUCATION/TRAINING PROGRAM

## 2024-09-17 PROCEDURE — 3008F BODY MASS INDEX DOCD: CPT | Performed by: STUDENT IN AN ORGANIZED HEALTH CARE EDUCATION/TRAINING PROGRAM

## 2024-09-17 PROCEDURE — 3074F SYST BP LT 130 MM HG: CPT | Performed by: STUDENT IN AN ORGANIZED HEALTH CARE EDUCATION/TRAINING PROGRAM

## 2024-09-17 PROCEDURE — 3078F DIAST BP <80 MM HG: CPT | Performed by: STUDENT IN AN ORGANIZED HEALTH CARE EDUCATION/TRAINING PROGRAM

## 2024-09-17 RX ORDER — AZATHIOPRINE 50 MG/1
TABLET ORAL
Qty: 120 TABLET | Refills: 1 | Status: SHIPPED | OUTPATIENT
Start: 2024-09-17 | End: 2024-09-17

## 2024-09-17 RX ORDER — AZATHIOPRINE 50 MG/1
TABLET ORAL
Qty: 270 TABLET | Refills: 1 | Status: SHIPPED | OUTPATIENT
Start: 2024-09-17

## 2024-09-17 ASSESSMENT — PAIN SCALES - GENERAL: PAINLEVEL: 8

## 2024-09-17 NOTE — PATIENT INSTRUCTIONS
Please obtain a bone density scan. Please obtain blood work prior to your next visit. Please follow-up in 4 months.

## 2024-09-17 NOTE — PROGRESS NOTES
I saw and evaluated the patient. I personally obtained the key and critical portions of the history and physical exam or was physically present for key and critical portions performed by the resident/fellow. I reviewed the resident/fellow's documentation and discussed the patient with the resident/fellow. I agree with the resident/fellow's medical decision making as documented in the note.    Prev diagnosis of IIM, based on significant objective proximal muscle weakness and dysphagia. EMG with evidence of a myopathy with fibrillation but negative myositis panel and nondiagnostic muscle biopsy. She had significant improvement with steroids/IVIG.  Has been controlled on AZA, tolerating with no a/e. She exhibits normal muscle strength on bedside examination and CPK.    Needs DEXA for osteoporosis screening.     Marcus Calderon MD  Division of Rheumatology  [facility]

## 2024-10-10 ENCOUNTER — TELEPHONE (OUTPATIENT)
Dept: RHEUMATOLOGY | Facility: CLINIC | Age: 64
End: 2024-10-10
Payer: MEDICARE

## 2024-10-10 NOTE — TELEPHONE ENCOUNTER
Patient said she got a letter from her insurance that Dr. Lezama is not covered and her visit needs to be resubmitted with the correct doctors name. Please advise.

## 2025-01-05 NOTE — PROGRESS NOTES
Recall  64 y.o. female with inflammatory polymyopathy  PMHx of primary biliary cirrhosis, HTN, GERD, DM, breast cancer, and osteoarthritis, hx of gastric bypass  Initially presented to [facility] on 9/25/2023 for weakness and fall. She was then transferred to  [facility] for acute hypoxic respiratory failure and rheumatologic/neuromuscular consult.  Patient was noted to have bilateral symmetric proximal weakness involving the deltoid and hip flexors, with dysphagia, and later developed acute hypoxic respiratory failure. No evident ILD changes on xray( no CT chest), no inflammatory arthritis, rashes or Raynaud's.  Her CPK on admission 2K, peaked at 11K and then down trended now to 422 with IV hydration.    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    Subjective   Patient ID: [id number]   Bisi Mckay is a 64 y.o. female who presents for follow-up of inflammatory myopathy.    Current IS:  -Azathioprine 150 mg daily (11/2023- )    Prior IS:  -Prednisone 60 mg daily 10/6/2023-2/2024 with atovaquone for pjp ppx  -IVIG 2 g/kg started on 10/6/2023-3/2024 total of 5 infusions     Interval History  Last visit 9/2024  Feeling well from myopathy standpoint  Feels like muscle strength stable  Reports previous generalized aches and pains have resolved  Having tailbone pain since June 29th- thinks related to her stimulator for bladder  Taking  azathioprine as prescribed, tolerating without side effects  Had DEXA and labs done today, not yet resulted    No muscle weakness  No issues rising from chair without support  Morning stiffness 15 mins. No pain at rest, mostly when she's active.  Doesn't wake up at night.   Some dermatitis on chin per derm, on abx from derm, otherwise no rash. No trouble swallowing. No SOB    Patient Active Problem List   Diagnosis    Acute hypoxic respiratory failure (Multi)    Muscle weakness    Diabetes mellitus, type 2 (Multi)    Abnormal liver enzymes    Acute metabolic acidosis    Acute renal failure (CMS-HCC)    Anxiety disorder    Arthritis    Irritable bowel syndrome with constipation    Depressive disorder    Gastroesophageal reflux disease    Hypertension    Mass of thyroid gland    Neuropathy    Traumatic ischemia of muscle    Mixed hyperlipidemia    Tachycardia    Vitamin D deficiency        Past Medical History:   Diagnosis Date    Abnormal liver enzymes     Arthritis     Breast cancer (Multi)     Constipation     Diabetes mellitus, type 2 (Multi)     GERD (gastroesophageal reflux disease)     Hypertension     Irritable bowel syndrome     Muscle weakness     Neuropathy         Past Surgical History:   Procedure Laterality Date    ABDOMINAL ADHESION SURGERY  09/24/2014    APPENDECTOMY  09/24/2014    BLADDER SUSPENSION  04/01/2007    BREAST LUMPECTOMY  06/2012    CARPAL TUNNEL RELEASE N/A 2016    CHOLECYSTECTOMY  11/01/2012    HYSTERECTOMY  04/01/2007        Social History     Socioeconomic History    Marital status: Unknown     Spouse name: Not on file    Number of children: Not on file    Years of education: Not on file    Highest education level: Not on file   Occupational History    Not on file   Tobacco Use    Smoking status: Never    Smokeless tobacco: Never   Substance and Sexual Activity    Alcohol use: Never     Comment: occasional    Drug use: Not on file    Sexual activity: Not on file   Other Topics Concern     Not on file   Social History Narrative    Not on file     Social Drivers of Health     Financial Resource Strain: Low Risk  (10/1/2023)    Overall Financial Resource Strain (CARDIA)     Difficulty of Paying Living Expenses: Not hard at all   Food Insecurity: No Food Insecurity (10/2/2023)    Hunger Vital Sign     Worried About Running Out of Food in the Last Year: Never true     Ran Out of Food in the Last Year: Never true   Transportation Needs: No Transportation Needs (10/2/2023)    PRAPARE - Transportation     Lack of Transportation (Medical): No     Lack of Transportation (Non-Medical): No   Physical Activity: Not on file   Stress: No Stress Concern Present (10/1/2023)    [place] [place] [employer] - Occupational Stress Questionnaire     Feeling of Stress : Not at all   Social Connections: Unknown (10/1/2023)    Social Connection and Isolation Panel [NHANES]     Frequency of Communication with Friends and Family: Once a week     Frequency of Social Gatherings with Friends and Family: Once a week     Attends Judaism Services: Never     Active Member of Clubs or Organizations: No     Attends Club or Organization Meetings: 1 to 4 times per year     Marital Status: Patient declined   Intimate Partner Violence: Not At Risk (10/2/2023)    Humiliation, Afraid, Rape, and Kick questionnaire     Fear of Current or Ex-Partner: No     Emotionally Abused: No     Physically Abused: No     Sexually Abused: No   Housing Stability: Low Risk  (10/2/2023)    Housing Stability Vital Sign     Unable to Pay for Housing in the Last Year: No     Number of Places Lived in the Last Year: 1     Unstable Housing in the Last Year: No        Allergies   Allergen Reactions    Bactrim [Sulfamethoxazole-Trimethoprim] Other, Fever and Chills     Chills, fever, flu-like symptoms - Per [facility] medical record          Current Outpatient Medications:     b complex 0.4 mg tablet, Take 1 tablet by mouth once daily., Disp: ,  Rfl:     calcium carbonate/vitamin D3 (CALCIUM 600 + D,3, ORAL), Take 1 capsule by mouth once daily in the morning., Disp: , Rfl:     carvedilol (Coreg) 6.25 mg tablet, , Disp: , Rfl:     cetirizine (ZyrTEC) 10 mg tablet, Take 1 tablet (10 mg) by mouth once daily in the morning., Disp: , Rfl:     cevimeline (Evoxac) 30 mg capsule, Take 1 capsule (30 mg) by mouth 2 times a day., Disp: , Rfl:     chlorhexidine (Peridex) 0.12 % solution, Use 15 mL in the mouth or throat 4 times a day., Disp: , Rfl:     cholecalciferol, vitD3,/vit K2 (VITAMIN D3-VITAMIN K2 ORAL), Take 5,000 Units by mouth., Disp: , Rfl:     co-enzyme Q-10 50 mg capsule, Take 4 capsules (200 mg) by mouth once daily., Disp: , Rfl:     cyanocobalamin (Vitamin B-12) 1,000 mcg tablet, Take 2 tablets (2,000 mcg) by mouth once daily in the morning., Disp: , Rfl:     dexlansoprazole (Dexilant) 60 mg DR capsule, Take 1 capsule (60 mg) by mouth once daily., Disp: , Rfl:     ferrous sulfate, dried 160 mg (50 mg iron) ER tablet, Take 160 mg by mouth. Mon/Wed/Fri,, Disp: , Rfl:     gabapentin (Neurontin) 300 mg capsule, Take 1 capsule (300 mg) by mouth every morning & take 3 capsules (900 mg) by mouth every night at bedtime., Disp: , Rfl:     HAIR, SKIN AND NAILS, BIOTIN, ORAL, Take by mouth., Disp: , Rfl:     hyoscyamine (Anaspaz, Levsin) 0.125 mg tablet, Take 1 tablet (0.125 mg) by mouth 2 times a day., Disp: , Rfl:     Iqirvo 80 mg tablet, , Disp: , Rfl:     magnesium oxide (Mag-Ox) 250 mg magnesium tablet, Take 1 tablet (250 mg) by mouth once daily., Disp: , Rfl:     metFORMIN  mg 24 hr tablet, once daily., Disp: , Rfl:     MILK THISTLE ORAL, Milk Thistle oral capsule 0 Refill(s) Start Date: 8/10/21 Status: Ordered, Disp: , Rfl:     mineral oil/petrolatum,white (PURALUBE OPHT), Administer 1 Application into both eyes every 8 hours if needed., Disp: , Rfl:     niacinamide 500 mg tablet, Take 1 tablet (500 mg) by mouth every other day., Disp: , Rfl:      plecanatide (Trulance) tablet tablet, Take 1 tablet (3 mg) by mouth once daily., Disp: , Rfl:     PNV no.95/ferrous fum/folic ac (PRENATAL ORAL), Take 1 tablet by mouth once daily in the morning., Disp: , Rfl:     spironolactone (Aldactone) 50 mg tablet, Take 1 tablet (50 mg) by mouth once daily., Disp: , Rfl:     ubidecarenone (coenzyme Q-10) 60 mg capsule, Take 1 capsule (60 mg) by mouth 2 times a day., Disp: , Rfl:     ursodiol (Actigall) 500 mg tablet, Take 2 tablets (1,000 mg) by mouth every morning & take 1 tablet (500 mg) by mouth every night at bedtime., Disp: , Rfl:     white petrolatum-mineral oiL 94-3 % ophthalmic ointment, Apply 1 Application to both eyes every 8 hours. 0.15 MG -0.85 MG, Disp: , Rfl:     ZINC ORAL, Take 140 mg by mouth. Tuesday and Saturday, Disp: , Rfl:     azaTHIOprine (Imuran) 50 mg tablet, take 2 tablets by mouth every morning and 1 tablet every NIGHT, Disp: 270 tablet, Rfl: 1     Objective   Vitals:    01/08/25 1253   BP: 137/82   Pulse: 73   Temp: 36.2 °C (97.1 °F)   SpO2: 96%          Physical Exam  General - NAD, sitting up in chair, well-groomed, pleasant, AAOx3  Head: Normocephalic, atraumatic  Eyes - PERRLA, EOMI. No conjunctiva injection.   Mouth/ENT - Moist oral and nasal mucosa. No facial rash. No enlarged parotid or submandibular gland. Adequate salivary pooling.  Cardiovascular - Normal S1, S2. Regular rate and rhythm. No murmurs or rubs.  Lungs - Symmetric chest expansion. Clear to auscultation bilaterally.   Skin - No rashes or ulcers. Skin warm and dry. No erythema on bilateral cheeks.  Extremities - No edema, cyanosis ,or clubbing  Neurological - Alert and oriented x 3,  grossly intact. No focal deficit. 5/5 motor strength bilaterally in upper and lower extremities    Musculoskeletal -  Shoulders: Limited active flexion, without pain, no swelling, warmth or tenderness.  Elbows: Full ROM, without pain, no swelling, warmth or tenderness.  Wrists: Full ROM, without pain,  no swelling, warmth or tenderness.  MCP: No swelling, warmth or tenderness. Metacarpal squeeze negative  PIP: No swelling, warmth or tenderness.  DIP: No swelling, warmth or tenderness.  Hands : 5/5.    Sacroiliac joints: No local tenderness. MEG negative.   Hips: Full ROM.  No malalignment.  Knees:  Full ROM, without pain, no swelling, warmth or point tenderness. No joint line tenderness, no pes anserine tenderness.  Ankles: Full ROM, without pain, swelling, warmth or tenderness.  Cervical spine: No tenderness or limitation of movement  Lumbar spine: No tenderness or limitation of movement     Lab Results   Component Value Date    WBC 12.6 (H) 01/11/2024    HGB 12.0 01/11/2024    HCT 37.9 01/11/2024    MCV 97 01/11/2024     01/11/2024        Chemistry    Lab Results   Component Value Date/Time     01/11/2024 1431    K 4.5 01/11/2024 1431     01/11/2024 1431    CO2 27 01/11/2024 1431    BUN 17 01/11/2024 1431    CREATININE 0.67 01/11/2024 1431    Lab Results   Component Value Date/Time    CALCIUM 9.6 01/11/2024 1431    ALKPHOS 73 01/11/2024 1431    AST 28 01/11/2024 1431    ALT 21 01/11/2024 1431    BILITOT 0.5 01/11/2024 1431           Lab Results   Component Value Date    CRP <0.10 01/11/2024      Lab Results   Component Value Date    SEDRATE 24 10/04/2023      Lab Results   Component Value Date    HEPBCAB Nonreactive 10/05/2023    HEPCAB Nonreactive 10/05/2023      Lab Results   Component Value Date    ALT 21 01/11/2024    AST 28 01/11/2024    ALKPHOS 73 01/11/2024    BILITOT 0.5 01/11/2024     XR DEXA bone density  Narrative: Interpreted By:  Quyen Stubbs,   STUDY:  DEXA BONE DENSITY1/8/2025 11:54 am      INDICATION:  Signs/Symptoms:screening for osteoporosis. The patient is a 65 y/o  year old F.      ,Z13.820 Encounter for screening for osteoporosis,Z78.0 Asymptomatic  menopausal state      COMPARISON:  None.      ACCESSION NUMBER(S):  [id number]      ORDERING CLINICIAN:  XIN  VINCENT      TECHNIQUE:  DEXA BONE DENSITY      FINDINGS:  SPINE L1-L4  Bone Mineral Density: 1.097  T-Score -0.7  Z-Score 0.9  Bone Mineral Density change vs baseline:  Not reported  Bone Mineral Density change vs previous: Not reported      LEFT FEMUR -TOTAL  Bone Mineral Density: 0.831  T-Score -1.4   Z-Score  -0.1  Bone Mineral Density change vs baseline: Not reported  Bone Mineral Density change vs previous: Not reported      LEFT FEMUR -NECK  Bone Mineral Density: 0.763  T-Score -2.0  Z-Score -0.4  Bone Mineral Density change vs baseline: Not reported  Bone Mineral Density change vs previous: Not reported          World Health Organization (WHO) criteria for post-menopausal,   Women:  Normal:         T-score at or above -1 SD  Osteopenia:   T-score between -1 and -2.5 SD  Osteoporosis: T-score at or below -2.5 SD          10-year Fracture Risk:  Major Osteoporotic Fracture  16.5  Hip Fracture                        3.0      Note:  If no FRAX score is reported, it is because:  Some T-score for Spine Total or Hip Total or Femoral Neck at or below  -2.5      Impression: DEXA:  According to World Health Organization criteria,  classification is low bone mass (osteopenia)      Followup recommended in two years or sooner as clinically warranted.      All images and detailed analysis are available on the  Radiology  PACS.      MACRO:  None      Signed by: Quyen Stubbs 1/8/2025 2:41 PM  Dictation workstation:   [id number]     === 05/15/24 ===    XR LUMBAR SPINE COMPLETE 4+ VIEWS    - Impression -  Mild to moderate multilevel spondylosis, most pronounced at L5-S1.    MACRO:  None    Signed by: Petey Stubbs 5/18/2024 9:04 AM  Dictation workstation:   [id number]   === 10/01/23 ===    MR LUMBAR SPINE W AND WO CONTRAST    - Impression -  Cervical spine:  1. Mild multilevel degenerative changes of the cervical spine most  notable for moderate central canal and bilateral neural foraminal  stenosis at C5-C6. No  abnormal cord signal at this level.  2. There is a heterogeneous enhancing 5.7 cm right thyroid mass as  described above with mild compression of the adjacent trachea and  posterior displacement of surrounding vasculature. The vessels are  patent. Recommend correlation with thyroid ultrasound.    Thoracic spine:  1. No significant central canal or neural foraminal stenosis.  2. 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  Recommend correlation with outside imaging to document stability. If  none available six-month follow-up scan can be obtained for further  evaluation.    Lumbar spine:  1. Multilevel degenerative changes without high-grade central canal  or neural foraminal stenosis.  2. Small left pleural effusion with adjacent opacity, likely  atelectasis.    I personally reviewed the images/study and I agree with the findings  as stated. This study was interpreted at [place], [place].    MACRO:  None    Signed by: Wiliam Zurita 10/10/2023 12:42 AM  Dictation workstation:   [id number]     Assessment/Plan   Bisi Mckay is a 64 y.o. female with PMHx of primary biliary cirrhosis, HTN, GERD, DM, breast cancer, and osteoarthritis, who is here for inflammatory myopathy follow up.     Inflammatory myopathy, now in remission   Off steroids and IVIG  Methotrexate is not an option given her PBC hence was started azathioprine 50 mg daily with up titration to 150 mg daily (TPMT enzyme WNL), on AZA since 11/2023  Previous symptoms of fatigue and myalgias have resolved and Cortisol AM/CPK/CBC/CMP/CRP/TSH were wnl 5/2024 9/2024 labs reviewed, at goal, normal CPK and AST/ALT    Continue azathioprine 150 mg daily, refilled today  Follow lab results from today  Update CBC, CMP, ESR, CRP, CPK prior to next visit    Health  Maintenance  Last DEXA not available, patient reports many years ago  DEXA done today, results pending, will follow-up  Patient still requires covid booster and pneumonia vaccine however she is hesitant for now given that her symptoms started after she got the flu shot     RTC in 4 months     Patient seen and discussed with Dr. Fiore.    Jennifer Santoro MD  Rheumatology Fellow, PGY-4     Orders Placed This Encounter   Procedures    CBC and Auto Differential    Comprehensive metabolic panel    Sedimentation Rate    C-reactive protein    CK

## 2025-01-08 ENCOUNTER — HOSPITAL ENCOUNTER (OUTPATIENT)
Dept: RADIOLOGY | Facility: CLINIC | Age: 65
Discharge: HOME | End: 2025-01-08
Payer: MEDICARE

## 2025-01-08 ENCOUNTER — APPOINTMENT (OUTPATIENT)
Dept: RHEUMATOLOGY | Facility: CLINIC | Age: 65
End: 2025-01-08
Payer: MEDICARE

## 2025-01-08 ENCOUNTER — LAB (OUTPATIENT)
Dept: LAB | Facility: LAB | Age: 65
End: 2025-01-08
Payer: MEDICARE

## 2025-01-08 VITALS
DIASTOLIC BLOOD PRESSURE: 82 MMHG | BODY MASS INDEX: 34.12 KG/M2 | TEMPERATURE: 97.1 F | HEART RATE: 73 BPM | WEIGHT: 204.8 LBS | OXYGEN SATURATION: 96 % | HEIGHT: 65 IN | SYSTOLIC BLOOD PRESSURE: 137 MMHG

## 2025-01-08 DIAGNOSIS — G72.49 INFLAMMATORY MYOPATHY: ICD-10-CM

## 2025-01-08 DIAGNOSIS — M85.859 OSTEOPENIA OF NECK OF FEMUR, UNSPECIFIED LATERALITY: ICD-10-CM

## 2025-01-08 DIAGNOSIS — Z78.0 ENCOUNTER FOR OSTEOPOROSIS SCREENING IN ASYMPTOMATIC POSTMENOPAUSAL PATIENT: ICD-10-CM

## 2025-01-08 DIAGNOSIS — M33.20 POLYMYOSITIS: Primary | ICD-10-CM

## 2025-01-08 DIAGNOSIS — M81.0 AGE-RELATED OSTEOPOROSIS WITHOUT CURRENT PATHOLOGICAL FRACTURE: ICD-10-CM

## 2025-01-08 DIAGNOSIS — Z79.69 LONG TERM (CURRENT) USE OF OTHER IMMUNOMODULATORS AND IMMUNOSUPPRESSANTS: ICD-10-CM

## 2025-01-08 DIAGNOSIS — Z13.820 ENCOUNTER FOR OSTEOPOROSIS SCREENING IN ASYMPTOMATIC POSTMENOPAUSAL PATIENT: ICD-10-CM

## 2025-01-08 LAB
ALBUMIN SERPL BCP-MCNC: 4.4 G/DL (ref 3.4–5)
ALP SERPL-CCNC: 118 U/L (ref 33–136)
ALT SERPL W P-5'-P-CCNC: 10 U/L (ref 7–45)
ANION GAP SERPL CALC-SCNC: 14 MMOL/L (ref 10–20)
AST SERPL W P-5'-P-CCNC: 13 U/L (ref 9–39)
BASOPHILS # BLD AUTO: 0.04 X10*3/UL (ref 0–0.1)
BASOPHILS NFR BLD AUTO: 0.7 %
BILIRUB SERPL-MCNC: 0.4 MG/DL (ref 0–1.2)
BUN SERPL-MCNC: 21 MG/DL (ref 6–23)
CALCIUM SERPL-MCNC: 9.7 MG/DL (ref 8.6–10.6)
CHLORIDE SERPL-SCNC: 106 MMOL/L (ref 98–107)
CK SERPL-CCNC: 36 U/L (ref 0–215)
CO2 SERPL-SCNC: 24 MMOL/L (ref 21–32)
CREAT SERPL-MCNC: 0.66 MG/DL (ref 0.5–1.05)
CRP SERPL-MCNC: 0.24 MG/DL
EGFRCR SERPLBLD CKD-EPI 2021: >90 ML/MIN/1.73M*2
EOSINOPHIL # BLD AUTO: 0.1 X10*3/UL (ref 0–0.7)
EOSINOPHIL NFR BLD AUTO: 1.7 %
ERYTHROCYTE [DISTWIDTH] IN BLOOD BY AUTOMATED COUNT: 12.9 % (ref 11.5–14.5)
ERYTHROCYTE [SEDIMENTATION RATE] IN BLOOD BY WESTERGREN METHOD: 34 MM/H (ref 0–30)
GLUCOSE SERPL-MCNC: 86 MG/DL (ref 74–99)
HCT VFR BLD AUTO: 40.9 % (ref 36–46)
HGB BLD-MCNC: 13.4 G/DL (ref 12–16)
IMM GRANULOCYTES # BLD AUTO: 0.03 X10*3/UL (ref 0–0.7)
IMM GRANULOCYTES NFR BLD AUTO: 0.5 % (ref 0–0.9)
LYMPHOCYTES # BLD AUTO: 1.7 X10*3/UL (ref 1.2–4.8)
LYMPHOCYTES NFR BLD AUTO: 29.2 %
MCH RBC QN AUTO: 30.5 PG (ref 26–34)
MCHC RBC AUTO-ENTMCNC: 32.8 G/DL (ref 32–36)
MCV RBC AUTO: 93 FL (ref 80–100)
MONOCYTES # BLD AUTO: 0.32 X10*3/UL (ref 0.1–1)
MONOCYTES NFR BLD AUTO: 5.5 %
NEUTROPHILS # BLD AUTO: 3.63 X10*3/UL (ref 1.2–7.7)
NEUTROPHILS NFR BLD AUTO: 62.4 %
NRBC BLD-RTO: 0 /100 WBCS (ref 0–0)
PLATELET # BLD AUTO: 269 X10*3/UL (ref 150–450)
POTASSIUM SERPL-SCNC: 4.4 MMOL/L (ref 3.5–5.3)
PROT SERPL-MCNC: 6.6 G/DL (ref 6.4–8.2)
RBC # BLD AUTO: 4.4 X10*6/UL (ref 4–5.2)
SODIUM SERPL-SCNC: 140 MMOL/L (ref 136–145)
WBC # BLD AUTO: 5.8 X10*3/UL (ref 4.4–11.3)

## 2025-01-08 PROCEDURE — 1159F MED LIST DOCD IN RCRD: CPT | Performed by: STUDENT IN AN ORGANIZED HEALTH CARE EDUCATION/TRAINING PROGRAM

## 2025-01-08 PROCEDURE — 99214 OFFICE O/P EST MOD 30 MIN: CPT | Performed by: STUDENT IN AN ORGANIZED HEALTH CARE EDUCATION/TRAINING PROGRAM

## 2025-01-08 PROCEDURE — 77080 DXA BONE DENSITY AXIAL: CPT | Performed by: RADIOLOGY

## 2025-01-08 PROCEDURE — 3075F SYST BP GE 130 - 139MM HG: CPT | Performed by: STUDENT IN AN ORGANIZED HEALTH CARE EDUCATION/TRAINING PROGRAM

## 2025-01-08 PROCEDURE — 3008F BODY MASS INDEX DOCD: CPT | Performed by: STUDENT IN AN ORGANIZED HEALTH CARE EDUCATION/TRAINING PROGRAM

## 2025-01-08 PROCEDURE — 1157F ADVNC CARE PLAN IN RCRD: CPT | Performed by: STUDENT IN AN ORGANIZED HEALTH CARE EDUCATION/TRAINING PROGRAM

## 2025-01-08 PROCEDURE — 3079F DIAST BP 80-89 MM HG: CPT | Performed by: STUDENT IN AN ORGANIZED HEALTH CARE EDUCATION/TRAINING PROGRAM

## 2025-01-08 PROCEDURE — 1126F AMNT PAIN NOTED NONE PRSNT: CPT | Performed by: STUDENT IN AN ORGANIZED HEALTH CARE EDUCATION/TRAINING PROGRAM

## 2025-01-08 PROCEDURE — 1036F TOBACCO NON-USER: CPT | Performed by: STUDENT IN AN ORGANIZED HEALTH CARE EDUCATION/TRAINING PROGRAM

## 2025-01-08 PROCEDURE — 77080 DXA BONE DENSITY AXIAL: CPT

## 2025-01-08 RX ORDER — AZATHIOPRINE 50 MG/1
TABLET ORAL
Qty: 270 TABLET | Refills: 1 | Status: SHIPPED | OUTPATIENT
Start: 2025-01-08

## 2025-01-08 RX ORDER — ELAFIBRANOR 80 MG/1
TABLET, FILM COATED ORAL
COMMUNITY
Start: 2024-12-27

## 2025-01-08 ASSESSMENT — PAIN SCALES - GENERAL: PAINLEVEL_OUTOF10: 0-NO PAIN

## 2025-01-09 ENCOUNTER — TELEPHONE (OUTPATIENT)
Dept: RHEUMATOLOGY | Facility: CLINIC | Age: 65
End: 2025-01-09
Payer: MEDICARE

## 2025-01-09 DIAGNOSIS — M81.0 AGE-RELATED OSTEOPOROSIS WITHOUT CURRENT PATHOLOGICAL FRACTURE: ICD-10-CM

## 2025-01-09 DIAGNOSIS — M85.859 OSTEOPENIA OF NECK OF FEMUR, UNSPECIFIED LATERALITY: Primary | ICD-10-CM

## 2025-01-09 LAB — 25(OH)D3 SERPL-MCNC: 58 NG/ML (ref 30–100)

## 2025-01-09 NOTE — TELEPHONE ENCOUNTER
Labs reviewed- mildly elevated ESR (nonspecific), normal CMP, CRP, CK, CBC.  Stable disease activity, no indications to change treatment at this time.  Recommend continuing current treatment plan.  Discussed with patient and she agrees with the plan.    DEXA reviewed on PACS- consistent with osteopenia.  FRAX personally calculated 17.3% major/3.1% hip  Lowest T-score LFN -2.0  Antiresorptive therapy is indicated at this time.  Per discussion with patient, she reports she was previously diagnosed with osteopenia but has not been on antiresorptive therapy. Discussed therapy options with patient, would recommend IV bisphosphonate therapy given contraindication to p.o. (previous bariatric surgery).    Possible medication interactions reviewed on UpToDate:  Imuran  Coreg  ZyrTEC [OTC]  Cevimeline  Peridex  Coenzyme Q-10  Dexlansoprazole  Ferrous Sulfate  Gabapentin  Hyoscyamine  Iqirvo  MetFORMIN  Trulance  Niacinamide  Aldactone  Ursodiol    There does not appear to be any contraindication with zoledronic acid with any of the above medications, however, per review of drug interactions, concurrent PPI use may decrease efficacy of bisphosphonate therapy.  Discussed with pharmacy team, less likely to be a concern with zoledronic acid since it is administered via IV.    Patient was counseled to continue calcium and vitamin D supplementation.  She is already on vitamin D 5000 units daily, and vitamin D level had returned normal.  Already takes daily calcium supplement.

## 2025-01-10 ENCOUNTER — TELEPHONE (OUTPATIENT)
Dept: RHEUMATOLOGY | Facility: CLINIC | Age: 65
End: 2025-01-10
Payer: MEDICARE

## 2025-01-10 NOTE — TELEPHONE ENCOUNTER
Spoke with patient, all questions and concerns addressed. Patient is agreeable to starting Reclast infusions.

## 2025-01-21 DIAGNOSIS — M85.80 OSTEOPENIA, UNSPECIFIED LOCATION: Primary | ICD-10-CM

## 2025-01-21 RX ORDER — ZOLEDRONIC ACID 5 MG/100ML
5 INJECTION, SOLUTION INTRAVENOUS ONCE
OUTPATIENT
Start: 2025-01-24

## 2025-01-21 RX ORDER — FAMOTIDINE 10 MG/ML
20 INJECTION INTRAVENOUS ONCE AS NEEDED
OUTPATIENT
Start: 2025-01-24

## 2025-01-21 RX ORDER — ZOLEDRONIC ACID 5 MG/100ML
5 INJECTION, SOLUTION INTRAVENOUS
Qty: 100 ML | Refills: 0 | OUTPATIENT
Start: 2025-01-21

## 2025-01-21 RX ORDER — EPINEPHRINE 0.3 MG/.3ML
0.3 INJECTION SUBCUTANEOUS EVERY 5 MIN PRN
OUTPATIENT
Start: 2025-01-24

## 2025-01-21 RX ORDER — ALBUTEROL SULFATE 0.83 MG/ML
3 SOLUTION RESPIRATORY (INHALATION) AS NEEDED
OUTPATIENT
Start: 2025-01-24

## 2025-01-21 RX ORDER — DIPHENHYDRAMINE HYDROCHLORIDE 50 MG/ML
50 INJECTION INTRAMUSCULAR; INTRAVENOUS AS NEEDED
OUTPATIENT
Start: 2025-01-24

## 2025-01-21 NOTE — PROGRESS NOTES
Per Dr. Santoro:      We would like to start this patient on Reclast infusions for osteopenia with high FRAX. She is not a candidate for PO bisphosphonates given history of bariatric surgery. Orders can be placed under myself or Dr. Fiore. Please let me know if you need anything else. Thank you!

## 2025-01-23 ENCOUNTER — DOCUMENTATION (OUTPATIENT)
Dept: INFUSION THERAPY | Facility: CLINIC | Age: 65
End: 2025-01-23
Payer: MEDICARE

## 2025-01-23 ENCOUNTER — SPECIALTY PHARMACY (OUTPATIENT)
Dept: PHARMACY | Facility: CLINIC | Age: 65
End: 2025-01-23

## 2025-01-23 ENCOUNTER — TELEPHONE (OUTPATIENT)
Dept: RHEUMATOLOGY | Facility: CLINIC | Age: 65
End: 2025-01-23
Payer: MEDICARE

## 2025-01-23 NOTE — PROGRESS NOTES
CLINICAL CLEARANCE FOR OUTPATIENT INFUSION      Patient to be scheduled for  New Start of Reclast infusions  For Diagnosis: Osteoporosis     Labs required prior to treatment:  Lab Results   Component Value Date    CREATININE 0.66 01/08/2025     CrCl: 121.241  (hold / contact prescribing provider if <35ml/min)     Corrected Calcium: 9.38  (Hold/ contact prescribing provider if <8.6 mg/dL)  Lab Results   Component Value Date    CALCIUM 9.7 01/08/2025    PHOS 2.8 10/06/2023      Lab Results   Component Value Date    ALBUMIN 4.4 01/08/2025        Calcium and Vitamin D supplement noted on medication list? Yes  (RECOMMENDED)    Current Outpatient Medications   Medication Instructions    azaTHIOprine (Imuran) 50 mg tablet take 2 tablets by mouth every morning and 1 tablet every NIGHT    b complex 0.4 mg tablet 1 tablet, Daily    calcium carbonate/vitamin D3 (CALCIUM 600 + D,3, ORAL) 1 capsule, Every morning    carvedilol (Coreg) 6.25 mg tablet     cetirizine (ZYRTEC) 10 mg, Every morning    cevimeline (Evoxac) 30 mg capsule 1 capsule, 2 times daily    chlorhexidine (Peridex) 0.12 % solution 15 mL, 4 times daily    cholecalciferol, vitD3,/vit K2 (VITAMIN D3-VITAMIN K2 ORAL) 5,000 Units    co-enzyme Q-10 200 mg, Daily    cyanocobalamin (Vitamin B-12) 1,000 mcg tablet 2 tablets, Every morning    dexlansoprazole (Dexilant) 60 mg DR capsule 1 capsule, Daily    ferrous sulfate, dried 160 mg    gabapentin (Neurontin) 300 mg capsule Take 1 capsule (300 mg) by mouth every morning & take 3 capsules (900 mg) by mouth every night at bedtime.    HAIR, SKIN AND NAILS, BIOTIN, ORAL Take by mouth.    hyoscyamine (ANASPAZ, LEVSIN) 0.125 mg, 2 times daily    Iqirvo 80 mg tablet     magnesium oxide (MAG-OX) 250 mg, Daily    metFORMIN  mg 24 hr tablet Daily RT    MILK THISTLE ORAL Milk Thistle oral capsule 0 Refill(s) Start Date: 8/10/21 Status: Ordered    mineral oil/petrolatum,white (PURALUBE OPHT) 1 Application, Every 8 hours PRN     niacinamide 500 mg, Every other day    plecanatide (TRULANCE) 3 mg, Daily    PNV no.95/ferrous fum/folic ac (PRENATAL ORAL) 1 tablet, Every morning    spironolactone (ALDACTONE) 50 mg, Daily    ubidecarenone (coenzyme Q-10) 60 mg capsule 1 capsule, 2 times daily    ursodiol (Actigall) 500 mg tablet Take 2 tablets (1,000 mg) by mouth every morning & take 1 tablet (500 mg) by mouth every night at bedtime.    white petrolatum-mineral oiL 94-3 % ophthalmic ointment 1 Application, Every 8 hours    ZINC ORAL 140 mg    zoledronic acid (Reclast) 5 mg/100 mL piggyback 5 mg, intravenous, Yearly        Is the patient receiving or have an allergy to Zometa? No  Allergies   Allergen Reactions    Bactrim [Sulfamethoxazole-Trimethoprim] Other, Fever and Chills     Chills, fever, flu-like symptoms - Per [facility] medical record        No obvious recent dental work per chart review. Nurse to confirm no dental within past/next 4 weeks at encounter.    Urine Hcg test ordered prior to first infusion? Not applicable     Last infusion received: NA (if continuation)    Due: ANYTIME    Labs drawn no more than 28 days prior to their scheduled appointment    Okay to schedule for treatment as ordered by prescribing provider

## 2025-01-23 NOTE — TELEPHONE ENCOUNTER
Brandi of [facility] has questions regarding infusion-pt cannot remember the name of infusion. She is looking for clarity of possible medication interaction. Brandi can be reached at [phone]

## 2025-02-27 ENCOUNTER — TELEPHONE (OUTPATIENT)
Dept: RHEUMATOLOGY | Facility: CLINIC | Age: 65
End: 2025-02-27

## 2025-02-27 NOTE — TELEPHONE ENCOUNTER
Patient received a call from the infon center for her infusion for osteo. She wasn't to know if she can get it done at [facility]. [facility] - [phone]  Fax: [phone]

## 2025-05-13 NOTE — PROGRESS NOTES
Recall  65 y.o. female with inflammatory polymyopathy  PMHx of primary biliary cirrhosis, HTN, GERD, DM, breast cancer, and osteoarthritis, hx of gastric bypass  Initially presented to [facility] on 9/25/2023 for weakness and fall. She was then transferred to  [facility] for acute hypoxic respiratory failure and rheumatologic/neuromuscular consult.  Patient was noted to have bilateral symmetric proximal weakness involving the deltoid and hip flexors, with dysphagia, and later developed acute hypoxic respiratory failure. No evident ILD changes on xray( no CT chest), no inflammatory arthritis, rashes or Raynaud's.  Her CPK on admission 2K, peaked at 11K and then down trended now to 422 with IV hydration.    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    Subjective   Patient ID: [id number]   Bisi Mckay is a 65 y.o. female who presents for follow-up of inflammatory myopathy.    Current IS:  -Azathioprine 150 mg daily (11/2023- )  -IV Reclast (5/2025- )    Prior IS:  -Prednisone 60 mg daily 10/6/2023-2/2024 with atovaquone for pjp ppx  -IVIG 2 g/kg started on 10/6/2023-3/2024 total of 5 infusions     Interval History  Last seen 1/2025  Feeling well from myopathy standpoint  Feels like muscle strength stable  Reports previous generalized aches and pains have resolved  Still having some back and tailbone pain- tailbone pain previously  attributed to bladder stimulator  Taking azathioprine as prescribed, tolerating without side effects    Recently diagnosed with hemangiomas on her liver, kidneys, and spleen - seen on OSH MRI  Always gets very bloated when eating  Diagnosed with primary biliary cholangitis in the past, looking to transfer GI care    Getting 1st Reclast today    No muscle weakness  No issues rising from chair without support  Morning stiffness minimal.  No pain at rest, mostly when she's active.   No rashes. No dysphagia. No SOB    Labs at [facility] reviewed under media tab  5/9/25  CK normal 45  CMP mild hyponatremia otherwise wnl  ESR normal      Patient Active Problem List   Diagnosis    Acute hypoxic respiratory failure    Muscle weakness    Diabetes mellitus, type 2 (Multi)    Abnormal liver enzymes    Acute metabolic acidosis    Acute renal failure    Anxiety disorder    Arthritis    Irritable bowel syndrome with constipation    Depressive disorder    Gastroesophageal reflux disease    Hypertension    Mass of thyroid gland    Neuropathy    Traumatic ischemia of muscle    Mixed hyperlipidemia    Tachycardia    Vitamin D deficiency    Osteopenia        Past Medical History:   Diagnosis Date    Abnormal liver enzymes     Arthritis     Breast cancer (Multi)     Constipation     Diabetes mellitus, type 2 (Multi)     GERD (gastroesophageal reflux disease)     Hypertension     Irritable bowel syndrome     Muscle weakness     Neuropathy         Past Surgical History:   Procedure Laterality Date    ABDOMINAL ADHESION SURGERY  09/24/2014    APPENDECTOMY  09/24/2014    BLADDER SUSPENSION  04/01/2007    BREAST LUMPECTOMY  06/2012    CARPAL TUNNEL RELEASE N/A 2016    CHOLECYSTECTOMY  11/01/2012    HYSTERECTOMY  04/01/2007        Social History     Socioeconomic History    Marital status: Unknown     Spouse name: Not on file    Number of children: Not on file    Years of education: Not on file    Highest education level: Not on file   Occupational  History    Not on file   Tobacco Use    Smoking status: Never    Smokeless tobacco: Never   Substance and Sexual Activity    Alcohol use: Never     Comment: occasional    Drug use: Not on file    Sexual activity: Not on file   Other Topics Concern    Not on file   Social History Narrative    Not on file     Social Drivers of Health     Financial Resource Strain: Low Risk  (10/1/2023)    Overall Financial Resource Strain (CARDIA)     Difficulty of Paying Living Expenses: Not hard at all   Food Insecurity: No Food Insecurity (10/2/2023)    Hunger Vital Sign     Worried About Running Out of Food in the Last Year: Never true     Ran Out of Food in the Last Year: Never true   Transportation Needs: No Transportation Needs (10/2/2023)    PRAPARE - Transportation     Lack of Transportation (Medical): No     Lack of Transportation (Non-Medical): No   Physical Activity: Not on file   Stress: No Stress Concern Present (10/1/2023)    [employer] - Occupational Stress Questionnaire     Feeling of Stress : Not at all   Social Connections: Unknown (10/1/2023)    Social Connection and Isolation Panel [NHANES]     Frequency of Communication with Friends and Family: Once a week     Frequency of Social Gatherings with Friends and Family: Once a week     Attends Jain Services: Never     Active Member of Clubs or Organizations: No     Attends Club or Organization Meetings: 1 to 4 times per year     Marital Status: Patient declined   Intimate Partner Violence: Not At Risk (10/2/2023)    Humiliation, Afraid, Rape, and Kick questionnaire     Fear of Current or Ex-Partner: No     Emotionally Abused: No     Physically Abused: No     Sexually Abused: No   Housing Stability: Low Risk  (10/2/2023)    Housing Stability Vital Sign     Unable to Pay for Housing in the Last Year: No     Number of Places Lived in the Last Year: 1     Unstable Housing in the Last Year: No        Allergies   Allergen Reactions     Bactrim [Sulfamethoxazole-Trimethoprim] Other, Fever and Chills     Chills, fever, flu-like symptoms - Per [facility] medical record          Current Outpatient Medications:     azaTHIOprine (Imuran) 50 mg tablet, take 2 tablets by mouth every morning and 1 tablet every NIGHT, Disp: 270 tablet, Rfl: 1    b complex 0.4 mg tablet, Take 1 tablet by mouth once daily., Disp: , Rfl:     calcium carbonate/vitamin D3 (CALCIUM 600 + D,3, ORAL), Take 1 capsule by mouth once daily in the morning., Disp: , Rfl:     carvedilol (Coreg) 6.25 mg tablet, , Disp: , Rfl:     cetirizine (ZyrTEC) 10 mg tablet, Take 1 tablet (10 mg) by mouth once daily in the morning., Disp: , Rfl:     cevimeline (Evoxac) 30 mg capsule, Take 1 capsule (30 mg) by mouth 2 times a day., Disp: , Rfl:     chlorhexidine (Peridex) 0.12 % solution, Use 15 mL in the mouth or throat 4 times a day. (Patient not taking: Reported on 5/14/2025), Disp: , Rfl:     cholecalciferol, vitD3,/vit K2 (VITAMIN D3-VITAMIN K2 ORAL), Take 5,000 Units by mouth., Disp: , Rfl:     co-enzyme Q-10 50 mg capsule, Take 4 capsules (200 mg) by mouth once daily., Disp: , Rfl:     cyanocobalamin (Vitamin B-12) 1,000 mcg tablet, Take 2 tablets (2,000 mcg) by mouth once daily in the morning., Disp: , Rfl:     dexlansoprazole (Dexilant) 60 mg DR capsule, Take 1 capsule (60 mg) by mouth once daily., Disp: , Rfl:     ferrous sulfate, dried 160 mg (50 mg iron) ER tablet, Take 160 mg by mouth. Mon/Wed/Fri,, Disp: , Rfl:     gabapentin (Neurontin) 300 mg capsule, Take 1 capsule (300 mg) by mouth every morning & take 3 capsules (900 mg) by mouth every night at bedtime. (Patient taking differently: 2 capsules (600 mg) once daily at bedtime. Take 1 capsule (300 mg) by mouth every morning & take 3 capsules (900 mg) by mouth every night at bedtime.), Disp: , Rfl:     HAIR, SKIN AND NAILS, BIOTIN, ORAL, Take by mouth., Disp: , Rfl:     hyoscyamine (Anaspaz, Levsin) 0.125 mg tablet, Take 1 tablet  (0.125 mg) by mouth 2 times a day. (Patient taking differently: Take 1 tablet (0.125 mg) by mouth if needed.), Disp: , Rfl:     Iqirvo 80 mg tablet, , Disp: , Rfl:     magnesium oxide (Mag-Ox) 250 mg magnesium tablet, Take 1 tablet (250 mg) by mouth once daily., Disp: , Rfl:     metFORMIN  mg 24 hr tablet, once daily. (Patient taking differently: 2 times daily (morning and late afternoon).), Disp: , Rfl:     MILK THISTLE ORAL, Milk Thistle oral capsule 0 Refill(s) Start Date: 8/10/21 Status: Ordered (Patient not taking: Reported on 5/14/2025), Disp: , Rfl:     mineral oil/petrolatum,white (PURALUBE OPHT), Administer 1 Application into both eyes every 8 hours if needed., Disp: , Rfl:     niacinamide 500 mg tablet, Take 1 tablet (500 mg) by mouth every other day. (Patient not taking: Reported on 5/14/2025), Disp: , Rfl:     plecanatide (Trulance) tablet tablet, Take 1 tablet (3 mg) by mouth once daily., Disp: , Rfl:     PNV no.95/ferrous fum/folic ac (PRENATAL ORAL), Take 1 tablet by mouth once daily in the morning., Disp: , Rfl:     spironolactone (Aldactone) 50 mg tablet, Take 1 tablet (50 mg) by mouth once daily., Disp: , Rfl:     ubidecarenone (coenzyme Q-10) 60 mg capsule, Take 1 capsule (60 mg) by mouth 2 times a day. (Patient taking differently: Take 1 capsule (60 mg) by mouth once daily.), Disp: , Rfl:     ursodiol (Actigall) 500 mg tablet, Take 2 tablets (1,000 mg) by mouth every morning & take 1 tablet (500 mg) by mouth every night at bedtime., Disp: , Rfl:     white petrolatum-mineral oiL 94-3 % ophthalmic ointment, Apply 1 Application to both eyes every 8 hours. 0.15 MG -0.85 MG (Patient not taking: Reported on 5/14/2025), Disp: , Rfl:     ZINC ORAL, Take 140 mg by mouth. Tuesday and Saturday, Disp: , Rfl:     zoledronic acid (Reclast) 5 mg/100 mL piggyback, Infuse 100 mL (5 mg) at 400 mL/hr over 15 minutes into a venous catheter yearly., Disp: 100 mL, Rfl: 0  No current facility-administered  medications for this visit.     Objective   Vitals:    05/14/25 0946   BP: 110/68   Pulse: 66   Temp: 36.4 °C (97.6 °F)   SpO2: 93%         Physical Exam  General - NAD, sitting up in chair, well-groomed, pleasant, AAOx3  Head: Normocephalic, atraumatic  Eyes - PERRLA, EOMI. No conjunctiva injection.   Mouth/ENT - Moist oral and nasal mucosa. No facial rash. No enlarged parotid or submandibular gland. Adequate salivary pooling.  Cardiovascular - Normal S1, S2. Regular rate and rhythm. No murmurs or rubs.  Lungs - Symmetric chest expansion. Clear to auscultation bilaterally.   Skin - No rashes or ulcers. Skin warm and dry. No erythema on bilateral cheeks.  Extremities - No edema, cyanosis ,or clubbing  Neurological - Alert and oriented x 3,  grossly intact. No focal deficit. 5/5 motor strength bilaterally in upper and lower extremities    Musculoskeletal -  Shoulders: Limited active flexion, without pain, no swelling, warmth or tenderness.  Elbows: Full ROM, without pain, no swelling, warmth or tenderness.  Wrists: Full ROM, without pain, no swelling, warmth or tenderness.  MCP: No swelling, warmth or tenderness. Metacarpal squeeze negative  PIP: No swelling, warmth or tenderness.  DIP: No swelling, warmth or tenderness.  Hands : 5/5.    Sacroiliac joints: No local tenderness. MEG negative.   Hips: Full ROM.  No malalignment.  Knees:  Full ROM, without pain, no swelling, warmth or point tenderness. No joint line tenderness, no pes anserine tenderness.  Ankles: Full ROM, without pain, swelling, warmth or tenderness.  Cervical spine: No tenderness or limitation of movement  Lumbar spine: No tenderness or limitation of movement     Lab Results   Component Value Date    WBC 5.8 01/08/2025    HGB 13.4 01/08/2025    HCT 40.9 01/08/2025    MCV 93 01/08/2025     01/08/2025        Chemistry    Lab Results   Component Value Date/Time     01/08/2025 1111    K 4.4 01/08/2025 1111     01/08/2025 1111     CO2 24 01/08/2025 1111    BUN 21 01/08/2025 1111    CREATININE 0.66 01/08/2025 1111    Lab Results   Component Value Date/Time    CALCIUM 9.7 01/08/2025 1111    ALKPHOS 118 01/08/2025 1111    AST 13 01/08/2025 1111    ALT 10 01/08/2025 1111    BILITOT 0.4 01/08/2025 1111           Lab Results   Component Value Date    CRP 0.24 01/08/2025      Lab Results   Component Value Date    SEDRATE 34 (H) 01/08/2025      Lab Results   Component Value Date    HEPBCAB Nonreactive 10/05/2023    HEPCAB Nonreactive 10/05/2023      Lab Results   Component Value Date    ALT 10 01/08/2025    AST 13 01/08/2025    ALKPHOS 118 01/08/2025    BILITOT 0.4 01/08/2025     XR DEXA bone density  Narrative: Interpreted By:  Quyen Stubbs,   STUDY:  DEXA BONE DENSITY1/8/2025 11:54 am      INDICATION:  Signs/Symptoms:screening for osteoporosis. The patient is a 65 y/o  year old F.      ,Z13.820 Encounter for screening for osteoporosis,Z78.0 Asymptomatic  menopausal state      COMPARISON:  None.      ACCESSION NUMBER(S):  [id number]      ORDERING CLINICIAN:  XIN KAUR      TECHNIQUE:  DEXA BONE DENSITY      FINDINGS:  SPINE L1-L4  Bone Mineral Density: 1.097  T-Score -0.7  Z-Score 0.9  Bone Mineral Density change vs baseline:  Not reported  Bone Mineral Density change vs previous: Not reported      LEFT FEMUR -TOTAL  Bone Mineral Density: 0.831  T-Score -1.4   Z-Score  -0.1  Bone Mineral Density change vs baseline: Not reported  Bone Mineral Density change vs previous: Not reported      LEFT FEMUR -NECK  Bone Mineral Density: 0.763  T-Score -2.0  Z-Score -0.4  Bone Mineral Density change vs baseline: Not reported  Bone Mineral Density change vs previous: Not reported          World Health Organization (WHO) criteria for post-menopausal,   Women:  Normal:         T-score at or above -1 SD  Osteopenia:   T-score between -1 and -2.5 SD  Osteoporosis: T-score at or below -2.5 SD          10-year Fracture Risk:  Major Osteoporotic  Fracture  16.5  Hip Fracture                        3.0      Note:  If no FRAX score is reported, it is because:  Some T-score for Spine Total or Hip Total or Femoral Neck at or below  -2.5      Impression: DEXA:  According to World Health Organization criteria,  classification is low bone mass (osteopenia)      Followup recommended in two years or sooner as clinically warranted.      All images and detailed analysis are available on the  Radiology  PACS.      MACRO:  None      Signed by: Quyen Stubbs 1/8/2025 2:41 PM  Dictation workstation:   [id number]     === 05/15/24 ===    XR LUMBAR SPINE COMPLETE 4+ VIEWS    - Impression -  Mild to moderate multilevel spondylosis, most pronounced at L5-S1.    MACRO:  None    Signed by: Petey Stubbs 5/18/2024 9:04 AM  Dictation workstation:   [id number]   === 10/01/23 ===    MR LUMBAR SPINE W AND WO CONTRAST    - Impression -  Cervical spine:  1. Mild multilevel degenerative changes of the cervical spine most  notable for moderate central canal and bilateral neural foraminal  stenosis at C5-C6. No abnormal cord signal at this level.  2. There is a heterogeneous enhancing 5.7 cm right thyroid mass as  described above with mild compression of the adjacent trachea and  posterior displacement of surrounding vasculature. The vessels are  patent. Recommend correlation with thyroid ultrasound.    Thoracic spine:  1. No significant central canal or neural foraminal stenosis.  2. 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  Recommend correlation with outside imaging to document stability. If  none available six-month follow-up scan can be obtained for further  evaluation.    Lumbar spine:  1. Multilevel degenerative changes without high-grade central canal  or neural foraminal stenosis.  2. Small left pleural effusion with adjacent  opacity, likely  atelectasis.    I personally reviewed the images/study and I agree with the findings  as stated. This study was interpreted at [facility], [place], [place].    MACRO:  None    Signed by: Wiliam Zurita 10/10/2023 12:42 AM  Dictation workstation:   [id number]     Assessment/Plan   Bisi Mckay is a 65 y.o. female with PMHx of primary biliary cirrhosis, HTN, GERD, DM, breast cancer, and osteoarthritis, who is here for inflammatory myopathy follow up.     Inflammatory myopathy, now in remission since 1/2024  Off steroids and IVIG  Methotrexate is not an option given previously diagnosed PBC- hence was started azathioprine 50 mg daily with up titration to 150 mg daily (TPMT enzyme WNL), on AZA since 11/2023  Previous symptoms of fatigue and myalgias have resolved and Cortisol AM/CPK/CBC/CMP/CRP/TSH were wnl 5/2024 5/2025 OSH labs reviewed, at goal, normal CPK and AST/ALT    Continue azathioprine 150 mg daily, refilled today. If still in remission 1/2025, will plan to reduce to 100 mg daily at that time  Update CBC, CMP, ESR, CRP, CPK prior to next visit    Osteopenia with high FRAX  1/2025 DEXA reviewed on PACS- consistent with osteopenia, lowest T-score LFN -2.0  FRAX personally calculated 17.3% major/3.1% hip  Antiresorptive therapy is indicated at this time  Patient on IV Reclast, to receive first dose today (5/2025- )  Continue vitamin D and calcium supplementation    Health Maintenance  Referred to  GI for previously diagnosed PBC, currently following with OSH and looking to transfer care    RTC in 4 months     Patient seen and discussed with Dr. Tabor.    Jennifer Santoro MD  Rheumatology Fellow, PGY-4     Orders Placed This Encounter   Procedures    CBC and Auto Differential    Comprehensive metabolic panel    Sedimentation Rate    C-Reactive Protein    Creatine Kinase    Referral to Gastroenterology

## 2025-05-14 ENCOUNTER — APPOINTMENT (OUTPATIENT)
Dept: RHEUMATOLOGY | Facility: CLINIC | Age: 65
End: 2025-05-14
Payer: MEDICARE

## 2025-05-14 ENCOUNTER — INFUSION (OUTPATIENT)
Dept: INFUSION THERAPY | Facility: CLINIC | Age: 65
End: 2025-05-14
Payer: MEDICARE

## 2025-05-14 VITALS
WEIGHT: 215.94 LBS | HEART RATE: 78 BPM | DIASTOLIC BLOOD PRESSURE: 66 MMHG | SYSTOLIC BLOOD PRESSURE: 114 MMHG | RESPIRATION RATE: 18 BRPM | TEMPERATURE: 98 F | BODY MASS INDEX: 35.93 KG/M2 | OXYGEN SATURATION: 97 %

## 2025-05-14 VITALS
TEMPERATURE: 97.6 F | HEART RATE: 66 BPM | DIASTOLIC BLOOD PRESSURE: 68 MMHG | OXYGEN SATURATION: 93 % | SYSTOLIC BLOOD PRESSURE: 110 MMHG | WEIGHT: 214 LBS | BODY MASS INDEX: 35.61 KG/M2

## 2025-05-14 DIAGNOSIS — M85.859 OSTEOPENIA OF NECK OF FEMUR, UNSPECIFIED LATERALITY: ICD-10-CM

## 2025-05-14 DIAGNOSIS — M33.20 POLYMYOSITIS: Primary | ICD-10-CM

## 2025-05-14 DIAGNOSIS — K74.3 PRIMARY BILIARY CHOLANGITIS (MULTI): ICD-10-CM

## 2025-05-14 DIAGNOSIS — M85.80 OSTEOPENIA, UNSPECIFIED LOCATION: ICD-10-CM

## 2025-05-14 DIAGNOSIS — G72.49 INFLAMMATORY MYOPATHY: ICD-10-CM

## 2025-05-14 PROCEDURE — 3078F DIAST BP <80 MM HG: CPT | Performed by: STUDENT IN AN ORGANIZED HEALTH CARE EDUCATION/TRAINING PROGRAM

## 2025-05-14 PROCEDURE — 99214 OFFICE O/P EST MOD 30 MIN: CPT | Performed by: STUDENT IN AN ORGANIZED HEALTH CARE EDUCATION/TRAINING PROGRAM

## 2025-05-14 PROCEDURE — 1125F AMNT PAIN NOTED PAIN PRSNT: CPT | Performed by: STUDENT IN AN ORGANIZED HEALTH CARE EDUCATION/TRAINING PROGRAM

## 2025-05-14 PROCEDURE — 96365 THER/PROPH/DIAG IV INF INIT: CPT | Performed by: NURSE PRACTITIONER

## 2025-05-14 PROCEDURE — 3074F SYST BP LT 130 MM HG: CPT | Performed by: STUDENT IN AN ORGANIZED HEALTH CARE EDUCATION/TRAINING PROGRAM

## 2025-05-14 RX ORDER — DIPHENHYDRAMINE HYDROCHLORIDE 50 MG/ML
50 INJECTION, SOLUTION INTRAMUSCULAR; INTRAVENOUS AS NEEDED
OUTPATIENT
Start: 2025-05-14

## 2025-05-14 RX ORDER — ALBUTEROL SULFATE 0.83 MG/ML
3 SOLUTION RESPIRATORY (INHALATION) AS NEEDED
OUTPATIENT
Start: 2025-05-14

## 2025-05-14 RX ORDER — EPINEPHRINE 0.3 MG/.3ML
0.3 INJECTION SUBCUTANEOUS EVERY 5 MIN PRN
OUTPATIENT
Start: 2025-05-14

## 2025-05-14 RX ORDER — FAMOTIDINE 10 MG/ML
20 INJECTION, SOLUTION INTRAVENOUS ONCE AS NEEDED
OUTPATIENT
Start: 2025-05-14

## 2025-05-14 RX ORDER — AZATHIOPRINE 50 MG/1
TABLET ORAL
Qty: 270 TABLET | Refills: 1 | Status: SHIPPED | OUTPATIENT
Start: 2025-05-14

## 2025-05-14 RX ORDER — ZOLEDRONIC ACID 5 MG/100ML
5 INJECTION, SOLUTION INTRAVENOUS ONCE
Status: COMPLETED | OUTPATIENT
Start: 2025-05-14 | End: 2025-05-14

## 2025-05-14 RX ORDER — ZOLEDRONIC ACID 5 MG/100ML
5 INJECTION, SOLUTION INTRAVENOUS ONCE
Status: CANCELLED | OUTPATIENT
Start: 2025-05-14

## 2025-05-14 RX ADMIN — ZOLEDRONIC ACID 5 MG: 5 INJECTION, SOLUTION INTRAVENOUS at 13:34

## 2025-05-14 ASSESSMENT — ENCOUNTER SYMPTOMS
DEPRESSION: 0
CHILLS: 0
NERVOUS/ANXIOUS: 0
FREQUENCY: 1
NAUSEA: 0
BRUISES/BLEEDS EASILY: 0
DIZZINESS: 0
COUGH: 0
ARTHRALGIAS: 0
WHEEZING: 0
FATIGUE: 0
HEADACHES: 0
EXTREMITY WEAKNESS: 0
UNEXPECTED WEIGHT CHANGE: 0
LIGHT-HEADEDNESS: 0
TROUBLE SWALLOWING: 0
VOMITING: 0
CONSTIPATION: 1
VOICE CHANGE: 0
DIARRHEA: 0
APPETITE CHANGE: 0
ABDOMINAL PAIN: 0
BLOOD IN STOOL: 0
SORE THROAT: 0
SHORTNESS OF BREATH: 0
NUMBNESS: 1
MYALGIAS: 1
FEVER: 0
PALPITATIONS: 0
WOUND: 0
HEMATURIA: 0
EYE PROBLEMS: 1
DYSURIA: 0
LEG SWELLING: 0

## 2025-05-14 ASSESSMENT — PAIN SCALES - GENERAL: PAINLEVEL_OUTOF10: 8

## 2025-05-14 NOTE — PATIENT INSTRUCTIONS
Today :We administered zoledronic acid.     For:   1. Osteopenia, unspecified location         Your next appointment is due in:  1  YEAR        Please read the  Medication Guide that was given to you and reviewed during todays visit.     (Tell all doctors including dentists that you are taking this medication)     Go to the emergency room or call 911 if:  -You have signs of allergic reaction:   -Rash, hives, itching.   -Swollen, blistered, peeling skin.   -Swelling of face, lips, mouth, tongue or throat.   -Tightness of chest, trouble breathing, swallowing or talking     Call your doctor:  - If IV / injection site gets red, warm, swollen, itchy or leaks fluid or pus.     (Leave dressing on your IV site for at least 2 hours and keep area clean and dry  - If you get sick or have symptoms of infection or are not feeling well for any reason.    (Wash your hands often, stay away from people who are sick)  - If you have side effects from your medication that do not go away or are bothersome.     (Refer to the teaching your nurse gave you for side effects to call your doctor about)    - Common side effects may include:  stuffy nose, headache, feeling tired, muscle aches, upset stomach  - Before receiving any vaccines     - Call the [facility] at   If:  - You get sick, are on antibiotics, have had a recent vaccine, have surgery or dental work and your doctor wants your visit rescheduled.  - You need to cancel and reschedule your visit for any reason. Call at least 2 days before your visit if you need to cancel.   - Your insurance changes before your next visit.    (We will need to get approval from your new insurance. This can take up to two weeks.)     The [facility] is opened Monday thru Friday. We are closed on weekends and holidays.   Voice mail will take your call if the center is closed. If you leave a message please allow 24 hours for a call back during weekdays. If you leave a  message on a weekend/holiday, we will call you back the next business day.    A pharmacist is available Monday - Friday from 8:30AM to 3:30PM to help answer any questions you may have about your prescriptions(s). Please call pharmacy at:    [facility]: [phone]  [facility]: [phone]  [facility]: [phone]

## 2025-05-14 NOTE — PROGRESS NOTES
[facility]   Infusion Clinic Note   Date: May 14, 2025   Name: Bisi Mckay  : 1960   MRN: [id number]         Reason for Visit: New Patient Visit and OP Infusion (PT HERE FOR FIRST DOSE OF RECLAST 5 MG INFUSION - NEXT DUE IN 1 YEAR)         Today: We administered zoledronic acid.       Ordered By: Jennifer Santoro MD       For a Diagnosis of: Osteopenia, unspecified location       At today's visit patient accompanied by: Self      Today's Vitals:   Vitals:    25 1305 25 1355   BP: 118/72 114/66   Pulse: 84 78   Resp: 18    Temp: 36.8 °C (98.2 °F) 36.7 °C (98 °F)   TempSrc: Temporal    SpO2: 96% 97%   Weight: 98 kg (215 lb 15.1 oz)              Pre - Treatment Checklist:      - Previous reaction to current treatment: n/a - FIRST DOSE      (Assess patient for the concerns below. Document provider notification as appropriate).  - Active or recent infection with/without current antibiotic use: PT TAKES PRN AMOXICILLIN FOR DERMATITIS ON FACE; HAS NOT TAKEN IT IN OVER 1 WEEK  - Recent or planned invasive dental work: no  - Recent or planned surgeries: no  - Recently received or plans to receive vaccinations: no  - Has treatment related toxicities: no  - Any chance may be pregnant:  n/a      Pain: 8 - LOWER BACK   - Is the pain different from normal: no   - Is prescribing Doctor aware:  yes      Labs: Reviewed       Fall Risk Screening: Jose Enrique Fall Risk  History of Falling, Immediate or Within 3 Months: No  Secondary Diagnosis: Yes  Ambulatory Aid: Walks without aid/bedrest/nurse assist  Intravenous Therapy/Heparin Lock: Yes  Gait/Transferring: Normal/bedrest/immobile  Mental Status: Oriented to own ability  Quispe Fall Risk Score: 35       Review Of Systems:  Review of Systems   Constitutional:  Negative for appetite change, chills, fatigue, fever and unexpected weight change.   HENT:   Negative for hearing loss, mouth sores, sore throat, tinnitus, trouble swallowing and voice  change.         PT HAS DX OF DRY MOUTH   Eyes:  Positive for eye problems.        WEARS GLASSES & DX OF DRY EYES   Respiratory:  Negative for cough, shortness of breath and wheezing.    Cardiovascular:  Negative for chest pain, leg swelling and palpitations.   Gastrointestinal:  Positive for constipation. Negative for abdominal pain, blood in stool, diarrhea, nausea and vomiting.        PT HAS CHRONIC CONSTIPATION   Genitourinary:  Positive for frequency. Negative for dysuria and hematuria.         PT ADMITS TO FREQUENCY AT BASELINE AND HAS AN IMPLANT TO HELP STIMULATE URINE PRODUCTION   Musculoskeletal:  Positive for myalgias. Negative for arthralgias.        PT ADMITS TO LOWER BACK PAIN THE LAST COUPLE OF MONTHS   Skin:  Negative for itching, rash and wound.   Neurological:  Positive for numbness. Negative for dizziness, extremity weakness, headaches and light-headedness.        PT ADMITS TO N/T IN HEELS AT BASELINE AT BEDTIME- TAKES GABAPENTIN FOR THIS   Hematological:  Does not bruise/bleed easily.   Psychiatric/Behavioral:  Negative for depression. The patient is not nervous/anxious.          Infusion Readiness:  - Assessment Concerns Related to Infusion: No  - Provider notified: n/a      New Patient Education:    NEW PATIENT MEDICATION EDUCATION PT PROVIDED WITH WRITTEN ([employer] PT EDUCATION SHEET) AND VERBAL EDUCATION REGARDING MEDICATION GIVEN. VERIFIED MEDICATION NAME WITH PATIENT AND DISCUSSED REASON FOR USE. BRIEFLY DISCUSSED HOW MEDICATION WORKS AND EDUCATED ON GOAL OF TREATMENT, FREQUENCY OF TREATMENT, ADVERSE RXN'S AND COMMON SIDE EFFECTS TO MONITOR FOR. INSTRUCTED PT TO ASSURE THAT ALL PROVIDERS INCLUDING DENTISTS ARE AWARE OF MEDICATION RECEIVED. DISCUSSED FLOW OF VISIT AND ORIENTED TO INFUSION CENTER. PT VERBALIZES UNDERSTANDING. CALL LIGHT PROVIDED AND PT AWARE TO ALERT STAFF OF ANY CONCERNS DURING TREATMENT.        Treatment Conditions & Drug Specific Questions:    Zoledronic Acid  (RECLAST)   "  (Unless otherwise specified on patient specific therapy plan):     TREATMENT CONDITIONS:  Unless otherwise specified on patient specific therapy plan HOLD and notify provider prior to proceeding with treatment if:   o Creatinine clearance LESS THAN 35 mL/Minute  o Corrected or Serum Calcium LESS THAN 8.6 mg/dL  OR Ionized calcium less than 1.1 mmol/L or  less than 4.7 mg/dL (depending on resulting agency)  o Recent (within 4 weeks) or planned invasive dental procedure.  -           Positive Pregnancy     Lab Results   Component Value Date    CREATININE 0.66 01/08/2025      Lab Results   Component Value Date    CALCIUM 9.7 01/08/2025    PHOS 2.8 10/06/2023      No results found for: \"CAION\"    CrCl: 123.555  Corrected calcium: 9.4    Patient meets treatment conditions? Yes    DRUG SPECIFIC QUESTIONS:  Is the patient taking a Calcium and Vitamin D supplement?  Yes  (Recommended)    Is the patient receiving Zometa or do they have an allergy to Zometa?  No    Is the patient aware of the adverse effects which may include bone fractures, hypocalcemia, influenza-like illness, musculoskeletal pain, ocular inflammation, and osteonecrosis of the jaw? Yes      REMINDERS:  PREGNANCY CATEGORY X DRUG. OBTAIN NEGATIVE PREGNANCY TEST PRIOR TO FIRST INFUSION FOR WOMEN OF CHILDBEARING ABILITY     Recommended Vitals/Observation:  Monitor vital signs before infusion, at the end of the infusion and as needed        Weight Based Drug Calculations:    WEIGHT BASED DRUGS: NOT APPLICABLE / FLAT DOSE       Post Treatment: Patient tolerated treatment without issue and was discharged in no apparent distress.      Note Authored / Patient Cared for By: Nelia Lino RN        "

## 2025-05-16 ENCOUNTER — TELEPHONE (OUTPATIENT)
Dept: RHEUMATOLOGY | Facility: CLINIC | Age: 65
End: 2025-05-16
Payer: MEDICARE

## 2025-05-16 NOTE — TELEPHONE ENCOUNTER
Patient was called back to follow-up [employer] message from today. Patient had flu-like symptoms starting the evening of her Reclast infusion on 5/14. Has also developed severe R knee pain/swelling as well as muscle weakness. Flu-like symptoms have since resolved but R knee pain/swelling and muscle weakness remain. Suspect that given timing of Reclast infusion and when symptoms began, and recent labs showing inflammatory myopathy is in remission, most likely etiology is side effect from Reclast. Patient reports that symptoms seem to have had some improvement since when they began. Advised symptomatic control for now but to notify us ASAP if symptoms persist or worsen. Callback number to office [phone] also provided.

## 2025-06-26 ENCOUNTER — TELEMEDICINE (OUTPATIENT)
Dept: SURGICAL ONCOLOGY | Facility: CLINIC | Age: 65
End: 2025-06-26
Payer: MEDICARE

## 2025-06-26 ENCOUNTER — HOSPITAL ENCOUNTER (OUTPATIENT)
Dept: RADIOLOGY | Facility: EXTERNAL LOCATION | Age: 65
Discharge: HOME | End: 2025-06-26

## 2025-06-26 DIAGNOSIS — D49.0 IPMN (INTRADUCTAL PAPILLARY MUCINOUS NEOPLASM): Primary | ICD-10-CM

## 2025-06-26 PROCEDURE — 99204 OFFICE O/P NEW MOD 45 MIN: CPT | Performed by: PHYSICIAN ASSISTANT

## 2025-06-26 PROCEDURE — 1036F TOBACCO NON-USER: CPT | Performed by: PHYSICIAN ASSISTANT

## 2025-06-26 NOTE — PROGRESS NOTES
"A virtual visit (audio and visual connection) between the patient (at the originating site) and the provider (at the distant site) was utilized to provide this telehealth service.    Verbal consent was requested and obtained from the patient immediately prior to the telehealth visit.     Subjective   Ms. Mckay is a 65-year-old female who is referred by Iqra Hayward DNP to discuss EUS for a pancreatic cyst.    She was diagnosed in the past with PBC. She recalls that her liver enzymes were elevated, including her alkaline phosphatase, and she underwent a liver biopsy. She recalls that the results were \"normal.\" She was placed on Ursodiol and her liver enzymes normalized. Elafibranor was added after her liver enzymes again trended up.     The GI NP that was managing her left the practice. She was seen by another provider who she states \"did not agree\" with the diagnosis of PBC. For this reason, imaging was pursued.     She had a MRCP without contrast on 2/24/25 that described abnormal dilation of the MPD in the head and tail with a 3.4 mm filling defect of the MPD in the pancreatic head. 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 She was referred here for an EUS.    She denies a personal history of acute pancreatitis. She had a cholecystectomy for gallstones and recalls being told that there was a residual gallstone in her bile duct. She was unable to have an ERCP due to her surgically altered anatomy (RYGB). She was told the stone \"passed.\"    She denies chronic abdominal pain, early satiety, poor appetite, jaundice or unintentional weight loss.     Medical and surgical history: HTN, OA, GERD, T2DM, breast cancer s/p lumpectomy, hysterectomy with bladder suspension, lap cholecystectomy, carpal tunnel release, Maryam-en-Y gastric bypass 2002, bowel obstruction s/p appendectomy and DEMETRI, " primary biliary cholangitis, inflammatory polymyopathy.  Family history: Father had pancreatic cancer, diagnosed in his 70's.  Social history: Non-smoker. No alcohol use. No illicits.    Objective     LMP  (LMP Unknown)      Physical Exam  A physical exam was not conducted as this was a phone or virtual visit. The patient is in no acute distress.     Assessment/Plan   Ms. Mckay is a 65-year-old female who is referred by Iqra Hayward DNP to discuss EUS for a pancreatic cyst.    PLAN: Her images were obtained and personally reviewed. There are subcentimeter cysts in the pancreatic head, likely BD-IPMNs. The MPD is prominent but without filling defects. Filling defects noted on prior MRCP are secondary to averaging from the surrounding parenchyma. There are no calcifications in the pancreas suggesting prior or chronic pancreatitis.    I discussed that branch duct IPMNs represent potentially pre-malignant lesions and are managed based on the presence or absence of high risk stigmata or concerning features including cyst size, cyst growth over time, enhancing mural nodule or main duct dilatation. Management decisions are based on these features, as well as, personal medical history, family history, presence or absence of symptoms and patient preference.    I discussed that EUS is not indicated for subcentimeter cysts. This can be followed with annual surveillance with MRI/MRCP with and without contrast with her established GI provider. In addition, she cannot have an EUS due to her surgically altered anatomy; Maryam-en-Y gastric bypass prohibits proper evaluation of the head of the pancreas.     She questions if she needs an appointment with Hepatology. Given the unclear diagnosis of PBC, I recommend it. I will see if Rogerio is able to send over her prior liver biopsy results to include in her chart.       Clara Lombardi PA-C

## 2025-07-25 DIAGNOSIS — M33.20 POLYMYOSITIS: ICD-10-CM

## 2025-07-28 RX ORDER — AZATHIOPRINE 50 MG/1
TABLET ORAL
Qty: 270 TABLET | Refills: 1 | Status: SHIPPED | OUTPATIENT
Start: 2025-07-28

## 2025-08-07 ENCOUNTER — APPOINTMENT (OUTPATIENT)
Facility: CLINIC | Age: 65
End: 2025-08-07
Payer: MEDICARE

## 2025-09-11 ENCOUNTER — APPOINTMENT (OUTPATIENT)
Facility: CLINIC | Age: 65
End: 2025-09-11
Payer: MEDICARE

## 2025-09-17 ENCOUNTER — APPOINTMENT (OUTPATIENT)
Dept: RHEUMATOLOGY | Facility: CLINIC | Age: 65
End: 2025-09-17
Payer: MEDICARE